# Patient Record
Sex: MALE | Race: WHITE | NOT HISPANIC OR LATINO | Employment: PART TIME | URBAN - NONMETROPOLITAN AREA
[De-identification: names, ages, dates, MRNs, and addresses within clinical notes are randomized per-mention and may not be internally consistent; named-entity substitution may affect disease eponyms.]

---

## 2017-06-09 ENCOUNTER — OFFICE VISIT (OUTPATIENT)
Dept: CARDIOLOGY | Facility: CLINIC | Age: 72
End: 2017-06-09

## 2017-06-09 VITALS
HEIGHT: 68 IN | HEART RATE: 65 BPM | BODY MASS INDEX: 25.91 KG/M2 | WEIGHT: 171 LBS | SYSTOLIC BLOOD PRESSURE: 102 MMHG | DIASTOLIC BLOOD PRESSURE: 70 MMHG

## 2017-06-09 DIAGNOSIS — I25.10 CORONARY ARTERY DISEASE INVOLVING NATIVE CORONARY ARTERY OF NATIVE HEART WITHOUT ANGINA PECTORIS: Primary | ICD-10-CM

## 2017-06-09 DIAGNOSIS — E11.9 TYPE 2 DIABETES MELLITUS WITHOUT COMPLICATION, WITHOUT LONG-TERM CURRENT USE OF INSULIN (HCC): ICD-10-CM

## 2017-06-09 DIAGNOSIS — E78.5 HYPERLIPIDEMIA LDL GOAL <70: ICD-10-CM

## 2017-06-09 DIAGNOSIS — I48.3 TYPICAL ATRIAL FLUTTER (HCC): ICD-10-CM

## 2017-06-09 DIAGNOSIS — Z95.3 STATUS POST AORTIC VALVE REPLACEMENT WITH BIOPROSTHETIC VALVE: ICD-10-CM

## 2017-06-09 PROCEDURE — 99213 OFFICE O/P EST LOW 20 MIN: CPT | Performed by: NURSE PRACTITIONER

## 2017-06-09 NOTE — PROGRESS NOTES
Encounter Date:06/09/2017    Patient ID: Christian Mcintosh is a 72 y.o. male who resides in [place], [place].    CC/Reason for visit:  Coronary Artery Disease (1 year follow up); Hyperlipidemia; and Hypertension          Christian Mcintosh returns today for 12 month follow-up of his prosthetic aortic valve replacement, mild coronary artery disease, hyperlipidemia and hypertension.  Since his last visit he had his left rotator cuff repair.  He feels he has done well with this but does still have some limited movement of his arm.  He says that his surgeon told him it may take up to a year before he has full range of motion.  He also had a hospitalization last year due to new onset of seizure.  He has not had any seizures since that one episode.  He is currently seeing neurology and was placed on Keppra.  Since he has had no recurrences of seizures he has been cleared for driving once again.  This past April his primary care provider started him on metformin for diabetes mellitus which is a new diagnosis for him.  He feels his heart is doing well.  He denies chest pain, dyspnea, orthopnea, palpitations or syncope.  He does carry a history of atrial flutter that occurred in the postop period from his aortic valve replacement.  He underwent an ablation with Dr. Padilla in 2013.  He has not had any reoccurrence of flutters.  He denies any signs or symptoms of a TIA or stroke.    Review of Systems   HENT: Positive for tinnitus.    Musculoskeletal: Positive for arthritis.   Genitourinary: Positive for frequency.   Neurological: Positive for excessive daytime sleepiness.       The patient's past medical, social, and family history reviewed in the patient's electronic medical record.    Allergies  Shellfish-derived products    Outpatient Prescriptions Marked as Taking for the 6/9/17 encounter (Office Visit) with RITCHIE Persaud   Medication Sig Dispense Refill   • amoxicillin (AMOXIL) 125 MG/5ML suspension Take  by mouth  "As Needed.     • aspirin 81 MG tablet Take 1 tablet by mouth daily.     • Docusate Sodium (STOOL SOFTENER) 100 MG capsule Take 1 tablet by mouth daily.     • lisinopril (PRINIVIL,ZESTRIL) 10 MG tablet Take 1 tablet by mouth daily.     • metFORMIN (GLUCOPHAGE) 500 MG tablet Take 500 mg by mouth Daily With Breakfast.     • Multiple Vitamins-Minerals (CENTRUM ADULTS PO) Take 1 tablet by mouth daily.     • pantoprazole (PROTONIX) 40 MG EC tablet Take 1 tablet by mouth daily.     • simvastatin (ZOCOR) 20 MG tablet Take 1 tablet by mouth every night.           Blood pressure 102/70, pulse 65, height 68\" (172.7 cm), weight 171 lb (77.6 kg).  Body mass index is 26 kg/(m^2).    Physical Exam   Constitutional: He is oriented to person, place, and time. He appears well-developed and well-nourished.   HENT:   Head: Normocephalic and atraumatic.   Eyes: Pupils are equal, round, and reactive to light. No scleral icterus.   Neck: No JVD present. Carotid bruit is not present. No thyromegaly present.   Cardiovascular: Normal rate, regular rhythm, S1 normal and S2 normal.  Exam reveals no gallop.    Murmur heard.  Pulmonary/Chest: Effort normal and breath sounds normal.   Abdominal: Soft. There is no tenderness.   Neurological: He is alert and oriented to person, place, and time.   Skin: Skin is warm and dry. No cyanosis. Nails show no clubbing.   Psychiatric: He has a normal mood and affect. His behavior is normal.       Data Review:   Procedures         Problem List Items Addressed This Visit        Cardiovascular and Mediastinum    Coronary artery disease involving native coronary artery of native heart without angina pectoris - Primary    Overview     · Cardiac catheterization (03/06/2013)  mild non obstructive CAD         Current Assessment & Plan     · Stable, patient asymptomatic  · Continue aspirin 81 mg daily         Status post aortic valve replacement with bioprosthetic valve    Overview     · Echocardiogram (08/08/2012): " Moderate AS, KLAUDIA of 0.8 cm2.  · Echocardiogram (02/26/2013): LVEF 55%, severe AS with mean gradient of 65 mmHg.   · Bioprosthetic aortic valve replacement by Dr. Stuart Griffin (03/26/2013): 25-mm Magna Ease with ascending aortoplasty. Left atrial appendage excluded         Current Assessment & Plan     · Asymptomatic  · Obtain echocardiogram before follow-up in 12 months         Relevant Orders    Adult Transthoracic Echo Complete With Contrast    Hyperlipidemia LDL goal <70    Overview     · High intensity statin therapy indicated given presence of coronary artery disease         Current Assessment & Plan     · Continue Zocor 20 mg daily  · Follow-up PCP for routine lipid monitoring         Typical atrial flutter    Overview     · Diagnosed postoperatively, asymptomatic.  · Typical flutter pattern on ECG, 05/03/2013. Initiated on Coumadin.  · Left atrial appendage excluded with AVR using [place] occluder.  · Radiofrequency ablation by Dr. Brooks Padilla, July 2013.   · Coumadin initiated following ablation but stopped after development of GI bleeding and a Tarsha-Villeda tear.          Current Assessment & Plan     · Patient asymptomatic            Endocrine    Type 2 diabetes mellitus without complication, without long-term current use of insulin    Current Assessment & Plan     · Follow-up with Dr. Merrill De La Paz         Relevant Medications    metFORMIN (GLUCOPHAGE) 500 MG tablet        Mr. Mcintosh is doing well from a cardiovascular standpoint.  He has had no signs or symptoms to suggest angina pectoris, valve malfunction or episodes of atrial flutter.  We will continue all of his current medications.  I will try to obtain recent blood work from his PCP.       · Continue current medications  · Echocardiogram before follow-up in 12 months.  · Follow-up 12 months or sooner if needed    Alba DUGAN evaluated treated patient independently    RITCHIE Lynn  6/9/2017      Addendum labs obtained from  PCP  Labs collected on 10/11/2016:  BUNs 14.1, calcium 9.9, creatinine 0.95, LDL 89, total cholesterol 162, HDL 46, triglycerides 133, hemoglobin 15.3, hematocrit 45.8, platelets 219, a LT 19, AST 23, potassium 4.8, sodium 14

## 2017-12-29 ENCOUNTER — TELEPHONE (OUTPATIENT)
Dept: CARDIOLOGY | Facility: CLINIC | Age: 72
End: 2017-12-29

## 2017-12-29 ENCOUNTER — APPOINTMENT (OUTPATIENT)
Dept: PREADMISSION TESTING | Facility: HOSPITAL | Age: 72
End: 2017-12-29

## 2017-12-29 VITALS — BODY MASS INDEX: 26.26 KG/M2 | WEIGHT: 167.33 LBS | HEIGHT: 67 IN

## 2017-12-29 LAB
ANION GAP SERPL CALCULATED.3IONS-SCNC: 5 MMOL/L (ref 3–11)
BUN BLD-MCNC: 29 MG/DL (ref 9–23)
BUN/CREAT SERPL: 29 (ref 7–25)
CALCIUM SPEC-SCNC: 9.1 MG/DL (ref 8.7–10.4)
CHLORIDE SERPL-SCNC: 102 MMOL/L (ref 99–109)
CO2 SERPL-SCNC: 30 MMOL/L (ref 20–31)
CREAT BLD-MCNC: 1 MG/DL (ref 0.6–1.3)
DEPRECATED RDW RBC AUTO: 46.1 FL (ref 37–54)
ERYTHROCYTE [DISTWIDTH] IN BLOOD BY AUTOMATED COUNT: 14.1 % (ref 11.3–14.5)
GFR SERPL CREATININE-BSD FRML MDRD: 74 ML/MIN/1.73
GLUCOSE BLD-MCNC: 84 MG/DL (ref 70–100)
HCT VFR BLD AUTO: 42.6 % (ref 38.9–50.9)
HGB BLD-MCNC: 13.6 G/DL (ref 13.1–17.5)
MCH RBC QN AUTO: 28.5 PG (ref 27–31)
MCHC RBC AUTO-ENTMCNC: 31.9 G/DL (ref 32–36)
MCV RBC AUTO: 89.3 FL (ref 80–99)
PLATELET # BLD AUTO: 363 10*3/MM3 (ref 150–450)
PMV BLD AUTO: 10.8 FL (ref 6–12)
POTASSIUM BLD-SCNC: 4.8 MMOL/L (ref 3.5–5.5)
RBC # BLD AUTO: 4.77 10*6/MM3 (ref 4.2–5.76)
SODIUM BLD-SCNC: 137 MMOL/L (ref 132–146)
WBC NRBC COR # BLD: 12.09 10*3/MM3 (ref 3.5–10.8)

## 2017-12-29 PROCEDURE — 36415 COLL VENOUS BLD VENIPUNCTURE: CPT

## 2017-12-29 PROCEDURE — 93010 ELECTROCARDIOGRAM REPORT: CPT | Performed by: INTERNAL MEDICINE

## 2017-12-29 PROCEDURE — 85027 COMPLETE CBC AUTOMATED: CPT | Performed by: UROLOGY

## 2017-12-29 PROCEDURE — 93005 ELECTROCARDIOGRAM TRACING: CPT

## 2017-12-29 PROCEDURE — 80048 BASIC METABOLIC PNL TOTAL CA: CPT | Performed by: UROLOGY

## 2017-12-29 RX ORDER — SIMVASTATIN 10 MG
10 TABLET ORAL NIGHTLY
COMMUNITY
End: 2019-05-07 | Stop reason: SDUPTHER

## 2017-12-29 RX ORDER — PRASTERONE (DHEA) 25 MG
25 CAPSULE ORAL DAILY
COMMUNITY

## 2017-12-29 RX ORDER — DUTASTERIDE AND TAMSULOSIN HYDROCHLORIDE CAPSULES .5; .4 MG/1; MG/1
1 CAPSULE ORAL DAILY
COMMUNITY
End: 2018-06-15

## 2017-12-29 NOTE — TELEPHONE ENCOUNTER
Patient is scheduled to have a green light laser on his prostate with Dr. Anabelle Alcaraz 3rd. That office advised him to stop his ASA 81 mg.     He has a bioprosthetic valve. He was on coumadin following his ablation for a flutter but it was stopped after developing a GI bleed. Also cath 3/6/2013 showed mild CAD. I advised Whitney in pre-admission testing that ideally, he should remain on it.  Is this OK with you?

## 2018-01-02 ENCOUNTER — ANESTHESIA EVENT (OUTPATIENT)
Dept: PERIOP | Facility: HOSPITAL | Age: 73
End: 2018-01-02

## 2018-01-02 RX ORDER — FAMOTIDINE 10 MG/ML
20 INJECTION, SOLUTION INTRAVENOUS ONCE
Status: CANCELLED | OUTPATIENT
Start: 2018-01-02 | End: 2018-01-02

## 2018-01-03 ENCOUNTER — ANESTHESIA (OUTPATIENT)
Dept: PERIOP | Facility: HOSPITAL | Age: 73
End: 2018-01-03

## 2018-01-03 ENCOUNTER — HOSPITAL ENCOUNTER (OUTPATIENT)
Facility: HOSPITAL | Age: 73
Setting detail: HOSPITAL OUTPATIENT SURGERY
Discharge: HOME | End: 2018-01-03
Attending: UROLOGY | Admitting: UROLOGY

## 2018-01-03 VITALS
RESPIRATION RATE: 18 BRPM | OXYGEN SATURATION: 94 % | TEMPERATURE: 97.5 F | WEIGHT: 167 LBS | HEIGHT: 67 IN | BODY MASS INDEX: 26.21 KG/M2 | SYSTOLIC BLOOD PRESSURE: 121 MMHG | HEART RATE: 67 BPM | DIASTOLIC BLOOD PRESSURE: 77 MMHG

## 2018-01-03 DIAGNOSIS — N40.1 ENLARGED PROSTATE WITH URINARY OBSTRUCTION: ICD-10-CM

## 2018-01-03 DIAGNOSIS — N13.8 ENLARGED PROSTATE WITH URINARY OBSTRUCTION: ICD-10-CM

## 2018-01-03 LAB
GLUCOSE BLDC GLUCOMTR-MCNC: 108 MG/DL (ref 70–130)
POTASSIUM BLDA-SCNC: 4.02 MMOL/L (ref 3.5–5.3)

## 2018-01-03 PROCEDURE — G0416 PROSTATE BIOPSY, ANY MTHD: HCPCS | Performed by: UROLOGY

## 2018-01-03 PROCEDURE — 25010000002 CEFTRIAXONE PER 250 MG: Performed by: UROLOGY

## 2018-01-03 PROCEDURE — 82962 GLUCOSE BLOOD TEST: CPT

## 2018-01-03 PROCEDURE — 25010000002 ONDANSETRON PER 1 MG: Performed by: NURSE ANESTHETIST, CERTIFIED REGISTERED

## 2018-01-03 PROCEDURE — 25010000002 FENTANYL CITRATE (PF) 100 MCG/2ML SOLUTION: Performed by: NURSE ANESTHETIST, CERTIFIED REGISTERED

## 2018-01-03 PROCEDURE — 25010000002 PROPOFOL 10 MG/ML EMULSION: Performed by: NURSE ANESTHETIST, CERTIFIED REGISTERED

## 2018-01-03 PROCEDURE — 25010000002 DEXAMETHASONE PER 1 MG: Performed by: NURSE ANESTHETIST, CERTIFIED REGISTERED

## 2018-01-03 PROCEDURE — 84132 ASSAY OF SERUM POTASSIUM: CPT | Performed by: ANESTHESIOLOGY

## 2018-01-03 PROCEDURE — 88344 IMHCHEM/IMCYTCHM EA MLT ANTB: CPT | Performed by: UROLOGY

## 2018-01-03 PROCEDURE — 25010000002 GENTAMICIN PER 80 MG: Performed by: UROLOGY

## 2018-01-03 RX ORDER — LIDOCAINE HYDROCHLORIDE 10 MG/ML
0.5 INJECTION, SOLUTION EPIDURAL; INFILTRATION; INTRACAUDAL; PERINEURAL ONCE AS NEEDED
Status: COMPLETED | OUTPATIENT
Start: 2018-01-03 | End: 2018-01-03

## 2018-01-03 RX ORDER — MAGNESIUM HYDROXIDE 1200 MG/15ML
LIQUID ORAL AS NEEDED
Status: DISCONTINUED | OUTPATIENT
Start: 2018-01-03 | End: 2018-01-03 | Stop reason: HOSPADM

## 2018-01-03 RX ORDER — NICOTINE POLACRILEX 4 MG
15 LOZENGE BUCCAL
Status: DISCONTINUED | OUTPATIENT
Start: 2018-01-03 | End: 2018-01-03 | Stop reason: HOSPADM

## 2018-01-03 RX ORDER — DEXTROSE MONOHYDRATE 25 G/50ML
25 INJECTION, SOLUTION INTRAVENOUS
Status: DISCONTINUED | OUTPATIENT
Start: 2018-01-03 | End: 2018-01-03 | Stop reason: HOSPADM

## 2018-01-03 RX ORDER — SODIUM CHLORIDE 0.9 % (FLUSH) 0.9 %
1-10 SYRINGE (ML) INJECTION AS NEEDED
Status: DISCONTINUED | OUTPATIENT
Start: 2018-01-03 | End: 2018-01-03 | Stop reason: HOSPADM

## 2018-01-03 RX ORDER — CIPROFLOXACIN 500 MG/1
500 TABLET, FILM COATED ORAL EVERY 12 HOURS SCHEDULED
Qty: 10 TABLET | Refills: 0 | Status: SHIPPED | OUTPATIENT
Start: 2018-01-03 | End: 2018-01-08

## 2018-01-03 RX ORDER — HYDROCODONE BITARTRATE AND ACETAMINOPHEN 5; 325 MG/1; MG/1
1 TABLET ORAL EVERY 6 HOURS PRN
Qty: 24 TABLET | Refills: 0 | Status: SHIPPED | OUTPATIENT
Start: 2018-01-03 | End: 2018-02-22

## 2018-01-03 RX ORDER — FENTANYL CITRATE 50 UG/ML
50 INJECTION, SOLUTION INTRAMUSCULAR; INTRAVENOUS
Status: DISCONTINUED | OUTPATIENT
Start: 2018-01-03 | End: 2018-01-03 | Stop reason: HOSPADM

## 2018-01-03 RX ORDER — PROPOFOL 10 MG/ML
VIAL (ML) INTRAVENOUS AS NEEDED
Status: DISCONTINUED | OUTPATIENT
Start: 2018-01-03 | End: 2018-01-03 | Stop reason: SURG

## 2018-01-03 RX ORDER — FAMOTIDINE 20 MG/1
20 TABLET, FILM COATED ORAL ONCE
Status: COMPLETED | OUTPATIENT
Start: 2018-01-03 | End: 2018-01-03

## 2018-01-03 RX ORDER — SODIUM CHLORIDE, SODIUM LACTATE, POTASSIUM CHLORIDE, CALCIUM CHLORIDE 600; 310; 30; 20 MG/100ML; MG/100ML; MG/100ML; MG/100ML
9 INJECTION, SOLUTION INTRAVENOUS CONTINUOUS
Status: DISCONTINUED | OUTPATIENT
Start: 2018-01-03 | End: 2018-01-03 | Stop reason: HOSPADM

## 2018-01-03 RX ORDER — ONDANSETRON 2 MG/ML
4 INJECTION INTRAMUSCULAR; INTRAVENOUS ONCE AS NEEDED
Status: COMPLETED | OUTPATIENT
Start: 2018-01-03 | End: 2018-01-03

## 2018-01-03 RX ORDER — CEFAZOLIN SODIUM 2 G/100ML
2 INJECTION, SOLUTION INTRAVENOUS ONCE
Status: DISCONTINUED | OUTPATIENT
Start: 2018-01-03 | End: 2018-01-03

## 2018-01-03 RX ORDER — CEFTRIAXONE SODIUM 1 G/50ML
1 INJECTION, SOLUTION INTRAVENOUS ONCE
Status: COMPLETED | OUTPATIENT
Start: 2018-01-03 | End: 2018-01-03

## 2018-01-03 RX ORDER — DEXAMETHASONE SODIUM PHOSPHATE 4 MG/ML
INJECTION, SOLUTION INTRA-ARTICULAR; INTRALESIONAL; INTRAMUSCULAR; INTRAVENOUS; SOFT TISSUE AS NEEDED
Status: DISCONTINUED | OUTPATIENT
Start: 2018-01-03 | End: 2018-01-03 | Stop reason: SURG

## 2018-01-03 RX ORDER — HYDROCODONE BITARTRATE AND ACETAMINOPHEN 5; 325 MG/1; MG/1
1 TABLET ORAL ONCE AS NEEDED
Status: COMPLETED | OUTPATIENT
Start: 2018-01-03 | End: 2018-01-03

## 2018-01-03 RX ORDER — LIDOCAINE HYDROCHLORIDE 10 MG/ML
INJECTION, SOLUTION EPIDURAL; INFILTRATION; INTRACAUDAL; PERINEURAL AS NEEDED
Status: DISCONTINUED | OUTPATIENT
Start: 2018-01-03 | End: 2018-01-03 | Stop reason: SURG

## 2018-01-03 RX ADMIN — FAMOTIDINE 20 MG: 20 TABLET ORAL at 06:48

## 2018-01-03 RX ADMIN — GENTAMICIN SULFATE 100 MG: 40 INJECTION, SOLUTION INTRAMUSCULAR; INTRAVENOUS at 07:30

## 2018-01-03 RX ADMIN — SODIUM CHLORIDE, POTASSIUM CHLORIDE, SODIUM LACTATE AND CALCIUM CHLORIDE 9 ML/HR: 600; 310; 30; 20 INJECTION, SOLUTION INTRAVENOUS at 06:48

## 2018-01-03 RX ADMIN — FENTANYL CITRATE 50 MCG: 50 INJECTION INTRAMUSCULAR; INTRAVENOUS at 07:31

## 2018-01-03 RX ADMIN — LIDOCAINE HYDROCHLORIDE 50 MG: 10 INJECTION, SOLUTION EPIDURAL; INFILTRATION; INTRACAUDAL; PERINEURAL at 07:25

## 2018-01-03 RX ADMIN — ONDANSETRON 4 MG: 2 INJECTION INTRAMUSCULAR; INTRAVENOUS at 07:31

## 2018-01-03 RX ADMIN — DEXAMETHASONE SODIUM PHOSPHATE 8 MG: 4 INJECTION, SOLUTION INTRAMUSCULAR; INTRAVENOUS at 07:31

## 2018-01-03 RX ADMIN — LIDOCAINE HYDROCHLORIDE 0.3 ML: 10 INJECTION, SOLUTION EPIDURAL; INFILTRATION; INTRACAUDAL; PERINEURAL at 06:48

## 2018-01-03 RX ADMIN — GENTAMICIN SULFATE: 40 INJECTION, SOLUTION INTRAMUSCULAR; INTRAVENOUS at 07:23

## 2018-01-03 RX ADMIN — FENTANYL CITRATE 50 MCG: 50 INJECTION INTRAMUSCULAR; INTRAVENOUS at 09:21

## 2018-01-03 RX ADMIN — FENTANYL CITRATE 50 MCG: 50 INJECTION INTRAMUSCULAR; INTRAVENOUS at 07:36

## 2018-01-03 RX ADMIN — CEFTRIAXONE SODIUM 1 G: 1 INJECTION, SOLUTION INTRAVENOUS at 07:23

## 2018-01-03 RX ADMIN — HYDROCODONE BITARTRATE AND ACETAMINOPHEN 1 TABLET: 5; 325 TABLET ORAL at 09:49

## 2018-01-03 RX ADMIN — PROPOFOL 200 MG: 10 INJECTION, EMULSION INTRAVENOUS at 07:25

## 2018-01-03 RX ADMIN — CEFTRIAXONE SODIUM 1 G: 1 INJECTION, SOLUTION INTRAVENOUS at 07:13

## 2018-01-03 RX ADMIN — FENTANYL CITRATE 50 MCG: 50 INJECTION INTRAMUSCULAR; INTRAVENOUS at 09:32

## 2018-01-03 NOTE — ANESTHESIA POSTPROCEDURE EVALUATION
Patient: Christian Mcintosh    Procedure Summary     Date Anesthesia Start Anesthesia Stop Room / Location    01/03/18 0723   [facility] / [facility]       Procedure Diagnosis Surgeon Provider    PROSTATE ULTRASOUND AND BIOPSY,CYSTOSCOPY TRANSURETHRAL RESECTION OF PROSTATE GREENLIGHT WITH VAPORIZATION (N/A ) No diagnosis on file. MD Andres Green MD          Anesthesia Type: general  Last vitals  BP   108/79 (01/03/18 0640)   Temp   98.2 °F (36.8 °C) (01/03/18 0640)   Pulse   64 (01/03/18 0640)   Resp   20 (01/03/18 0640)     SpO2   98 % (01/03/18 0640)     Post Anesthesia Care and Evaluation    Patient location during evaluation: PACU  Patient participation: complete - patient participated  Level of consciousness: awake and alert  Pain score: 0  Pain management: adequate  Airway patency: patent  Anesthetic complications: No anesthetic complications  PONV Status: none  Cardiovascular status: hemodynamically stable and acceptable  Respiratory status: nonlabored ventilation, acceptable and nasal cannula  Hydration status: acceptable

## 2018-01-03 NOTE — OP NOTE
Pre-op diagnosis is urinary retention and BPH and elevated PSA  Postoperative diagnosis: Same  Procedure performed: Transrectal ultrasound with biopsies of the prostate, cystoscopy with greenlight laser vaporization of the prostate ([id number] J)  Surgeon: Anabelle  Anesthesia: Gen.  Indications: This is a 72-year-old white male whose been in urinary retention for about the last month that occurred on a trip to out of [facility].  He's been doing self intermittent catheterization for the last 2 weeks.  Operative description: Patient was placed on the operating table in the dorsolithotomy position and general trach anesthesia was administered.  Groin was prepped and draped usual sterile fashion.  The Novaliq transrectal ultrasound probe was introduced.  The gland was measured to be 41.6 mm in width 35.2 in height 54.7 mm in length for a volume of 41.9 cm³.  It was extremely heterogeneous throughout.  The Biopty gun was then used to take biopsies from the left and right side and these were sent for pathologic examination.  The probe was then removed atraumatically.    The 24 Green[place] Olympus laser scope was then inserted.  The urethra suspected no be normal.  The prostate showed bilobar hyperplasia that was anatomically obstructing and the bladder was entered.  Orifices were in their normal location effluxing clear urine the bladder did have areas of hyperemia of the mucosa and the 2+ trabeculations with cellules throughout there were no stones or tumors noted.  The side-firing greenlight laser fiber was then introduced.  From  W of power the laser was used to vaporize the lateral lobes of the prostate in the usual fashion at no time was vaporization carried out proximal to the bladder neck or distal to the verumontanum.  A total of [id number] J were delivered over 36 minutes and 58 seconds of laser time.  Stasis was maintained with the laser throughout the case.  All appeared to be wide open and the orifices and external sphincter  were inspected and noted to be intact.  I then removed the laser scope and inserted a 22 [place] three-way Harris catheter.  This was connected to continuous irrigation and he was awakened and taken to the recovery room in stable condition on no complications  The hospital dictation system is broken so this was done on a voice recognition system.  There may be significant transcription errors.

## 2018-01-03 NOTE — ANESTHESIA POSTPROCEDURE EVALUATION
Patient: Christian Mcintosh    Procedure Summary     Date Anesthesia Start Anesthesia Stop Room / Location    01/03/18 0723   [facility] / [facility]       Procedure Diagnosis Surgeon Provider    PROSTATE ULTRASOUND AND BIOPSY,CYSTOSCOPY TRANSURETHRAL RESECTION OF PROSTATE GREENLIGHT WITH VAPORIZATION (N/A ) No diagnosis on file. MD Andres Green MD          Anesthesia Type: general  Last vitals  /75   Temp 98   Pulse 68   Resp 20   SpO2 95     Anesthesia Post Evaluation

## 2018-01-03 NOTE — ANESTHESIA PROCEDURE NOTES
Airway  Urgency: elective    Date/Time: 1/3/2018 7:32 AM  End Time:1/3/2018 7:32 AM  Airway not difficult    General Information and Staff    Patient location during procedure: OR  CRNA: CORBIN GLEZ    Indications and Patient Condition  Indications for airway management: airway protection    Preoxygenated: yes  Mask difficulty assessment: 1 - vent by mask    Final Airway Details  Final airway type: supraglottic airway      Successful airway: classic  Size 4    Number of attempts at approach: 1    Additional Comments  LMA placed without difficulty, ventilation with assist, equal breath sounds and symmetric chest rise and fall, easy atraumatic

## 2018-01-03 NOTE — H&P
Patient Care Team:      Chief complaint: Urinary Retention    Subjective:    Patient is a 72 y.o.male presents with Urinary Retension   Review of Systems:  General ROS: negative  Cardiovascular ROS: Atrial flutter hx  Respiratory ROS: no cough, shortness of breath, or wheezing      Allergies:   Allergies   Allergen Reactions   • Shellfish-Derived Products Anaphylaxis          Latex: no  Contrast Dye: no    Home Meds    Prescriptions Prior to Admission   Medication Sig Dispense Refill Last Dose   • amoxicillin (AMOXIL) 125 MG/5ML suspension Take  by mouth As Needed (for dental procedure).   Past Month at Unknown time   • aspirin 81 MG tablet Take 1 tablet by mouth Daily. 90 tablet 3 1/2/2018 at 0700   • Docusate Sodium (STOOL SOFTENER) 100 MG capsule Take 1 tablet by mouth daily.   1/3/2018 at 0415   • dutasteride-tamsulosin (KE) 0.5-0.4 MG capsule capsule Take 1 capsule by mouth Daily.   1/3/2018 at 0415   • levETIRAcetam (KEPPRA) 500 MG tablet Take 1 tablet by mouth 2 (two) times a day. (Patient taking differently: Take 250 mg by mouth Every Night.) 180 tablet 1 1/2/2018 at 2100   • lisinopril (PRINIVIL,ZESTRIL) 10 MG tablet Take 1 tablet by mouth daily.   1/2/2018 at 2100   • metFORMIN (GLUCOPHAGE) 500 MG tablet Take 500 mg by mouth Daily With Breakfast.   1/2/2018 at 0800   • Meth-Hyo-M Bl-Na Phos-Ph Sal (URO-MP PO) Take 1 tablet by mouth 3 (Three) Times a Day As Needed.   1/2/2018 at 0800   • pantoprazole (PROTONIX) 40 MG EC tablet Take 1 tablet by mouth daily.   1/3/2018 at 0415   • simvastatin (ZOCOR) 10 MG tablet Take 10 mg by mouth Every Night.   1/2/2018 at 2100   • Cholecalciferol (VITAMIN D3) 5000 units capsule capsule Take 5,000 Units by mouth Daily.   12/29/2017   • Coenzyme Q10 (CO Q 10) 100 MG capsule Take 200 mg by mouth Daily.   12/29/2017   • DHEA 25 MG capsule Take 25 mg by mouth Daily.   12/29/2017   • Multiple Vitamins-Minerals (CENTRUM ADULTS PO) Take 1 tablet by mouth daily.   12/29/2017  "  • Omega-3 Fatty Acids (FISH OIL PO) Take 1 tablet by mouth Daily.   12/29/2017     PMH:   Past Medical History:   Diagnosis Date   • Aortic stenosis    • Atrial flutter     RFA by Dr. Padilla   • Benign prostatic hypertrophy    • Cataract    • Coronary artery disease 03/06/2013   • GERD (gastroesophageal reflux disease)    • H/O atrial flutter    • History of echocardiogram    • Hyperlipidemia    • Hypertension    • PONV (postoperative nausea and vomiting)    • Seizure    • Skin cancer     skin   • Tinnitus      PSH:    Past Surgical History:   Procedure Laterality Date   • BONE SPUR ARM     • CARDIAC ABLATION     • CARDIAC CATHETERIZATION  03/06/2013   • CARDIAC VALVE REPLACEMENT  03/26/2013    bioprosthetic aortic valve   • CARDIAC VALVE REPLACEMENT     • CARPAL TUNNEL RELEASE     • CYST REMOVAL     • EYE SURGERY      bilateral cataract extraction   • HERNIA REPAIR     • KNEE MENISCAL REPAIR Bilateral    • PROSTATE BIOPSY     • ROTATOR CUFF REPAIR     • SHOULDER SURGERY Left    • SKIN CANCER EXCISION     • VASECTOMY       Immunization History: pneumo: yes   Flu: yes  Tetanus ?  Social History:   Tobacco: no   Alcohol: occ      Physical Exam:/79 (BP Location: Right arm, Patient Position: Lying)  Pulse 64  Temp 98.2 °F (36.8 °C) (Temporal Artery )   Resp 20  Ht 170.2 cm (67\")  Wt 75.8 kg (167 lb)  SpO2 98%  BMI 26.16 kg/m2      General Appearance:    Alert, cooperative, no distress, appears stated age   Head:    Normocephalic, without obvious abnormality, atraumatic   Lungs:     Clear to auscultation bilaterally, respirations unlabored    Heart:   Irregular rhythym, rate 60        Abdomen:    Soft without tenderness   Breast Exam:    deferred   Genitalia:    deferred   Extremities:   Extremities normal, atraumatic, no cyanosis or edema   Skin:   Skin color, texture, turgor normal, no rashes or lesions   Neurologic:   Grossly intact       Cancer Patient: __ yes __no __unknown; If yes, clinical stage " T:__ N:__M:__, stage group    Impression: Urinary Retention    Plan: Prostatic Ultrasound and Biopsy, Cystoscopy with Greenlight Laser Vaporization of the Prostate  Micha Lainez PA-C 1/3/2018 7:01 AM

## 2018-01-03 NOTE — ANESTHESIA PREPROCEDURE EVALUATION
Anesthesia Evaluation            Airway   Mallampati: II  Dental      Pulmonary    Cardiovascular     (+) hypertension, CAD,       Neuro/Psych  (+) seizures,     GI/Hepatic/Renal/Endo    (+)  diabetes mellitus,     Musculoskeletal     Abdominal    Substance History      OB/GYN          Other                                                Anesthesia Plan    ASA 3     general     intravenous induction   Anesthetic plan and risks discussed with patient.

## 2018-01-03 NOTE — BRIEF OP NOTE
CYSTOSCOPY TRANSURETHRAL RESECTION OF PROSTATE GREENLIGHT  Progress Note    Christian Mcintosh  1/3/2018    Pre-op Diagnosis:   Urinary retention secondary to BPH       Post-Op Diagnosis Codes:  Same    Procedure/CPT® Codes:      Procedure(s):  PROSTATE ULTRASOUND AND BIOPSY,CYSTOSCOPY TRANSURETHRAL PROSTATE GREENLIGHT WITH VAPORIZATION    Surgeon(s):  Silvio Ahn MD    Anesthesia: General    Staff:   Circulator: Darion Ma RN  Scrub Person: Shelia Prasad    Estimated Blood Loss: Minimal    Urine Voided: * No values recorded between 1/3/2018  7:23 AM and 1/3/2018  8:53 AM *    Specimens:                  ID Type Source Tests Collected by Time Destination   A : LEFT SIDE OF PROSTATE Tissue Prostate TISSUE EXAM Silvio Ahn MD 1/3/2018 0804    B : RIGHT SIDE OF PROSTATE Tissue Prostate TISSUE EXAM Silvio Ahn MD 1/3/2018 0807          Drains:   Urethral Catheter 01/03/18 100% silicone 22 30 (Active)           Findings: Obstructing prostate, trabeculated bladder     Complications: None, to recovery room stable  Silvio Ahn MD     Date: 1/3/2018  Time: 8:53 AM

## 2018-01-08 LAB
CYTO UR: NORMAL
LAB AP CASE REPORT: NORMAL
LAB AP CLINICAL INFORMATION: NORMAL
Lab: NORMAL
PATH REPORT.FINAL DX SPEC: NORMAL
PATH REPORT.GROSS SPEC: NORMAL

## 2018-05-14 DIAGNOSIS — Z95.2 STATUS POST AORTIC VALVE REPLACEMENT: Primary | ICD-10-CM

## 2018-06-15 ENCOUNTER — HOSPITAL ENCOUNTER (OUTPATIENT)
Dept: CARDIOLOGY | Facility: HOSPITAL | Age: 73
Discharge: HOME | End: 2018-06-15
Admitting: NURSE PRACTITIONER

## 2018-06-15 ENCOUNTER — OFFICE VISIT (OUTPATIENT)
Dept: CARDIOLOGY | Facility: CLINIC | Age: 73
End: 2018-06-15

## 2018-06-15 VITALS
BODY MASS INDEX: 26.64 KG/M2 | WEIGHT: 169.75 LBS | SYSTOLIC BLOOD PRESSURE: 124 MMHG | DIASTOLIC BLOOD PRESSURE: 74 MMHG | HEIGHT: 67 IN

## 2018-06-15 VITALS
BODY MASS INDEX: 27.15 KG/M2 | WEIGHT: 173 LBS | SYSTOLIC BLOOD PRESSURE: 124 MMHG | HEIGHT: 67 IN | HEART RATE: 60 BPM | DIASTOLIC BLOOD PRESSURE: 82 MMHG

## 2018-06-15 DIAGNOSIS — I25.10 CORONARY ARTERY DISEASE INVOLVING NATIVE CORONARY ARTERY OF NATIVE HEART WITHOUT ANGINA PECTORIS: Primary | ICD-10-CM

## 2018-06-15 DIAGNOSIS — E78.5 HYPERLIPIDEMIA LDL GOAL <70: ICD-10-CM

## 2018-06-15 DIAGNOSIS — I10 ESSENTIAL HYPERTENSION: ICD-10-CM

## 2018-06-15 DIAGNOSIS — E11.9 TYPE 2 DIABETES MELLITUS WITHOUT COMPLICATION, WITHOUT LONG-TERM CURRENT USE OF INSULIN (HCC): ICD-10-CM

## 2018-06-15 DIAGNOSIS — Z95.3 STATUS POST AORTIC VALVE REPLACEMENT WITH BIOPROSTHETIC VALVE: ICD-10-CM

## 2018-06-15 DIAGNOSIS — Z95.2 STATUS POST AORTIC VALVE REPLACEMENT: ICD-10-CM

## 2018-06-15 LAB
BH CV ECHO MEAS - AO MAX PG (FULL): 11.1 MMHG
BH CV ECHO MEAS - AO MAX PG: 16 MMHG
BH CV ECHO MEAS - AO MEAN PG (FULL): 7 MMHG
BH CV ECHO MEAS - AO MEAN PG: 9 MMHG
BH CV ECHO MEAS - AO ROOT AREA (BSA CORRECTED): 2
BH CV ECHO MEAS - AO ROOT AREA: 10.8 CM^2
BH CV ECHO MEAS - AO ROOT DIAM: 3.7 CM
BH CV ECHO MEAS - AO V2 MAX: 200.7 CM/SEC
BH CV ECHO MEAS - AO V2 MEAN: 140 CM/SEC
BH CV ECHO MEAS - AO V2 VTI: 42.6 CM
BH CV ECHO MEAS - AVA(I,A): 2 CM^2
BH CV ECHO MEAS - AVA(I,D): 2 CM^2
BH CV ECHO MEAS - AVA(V,A): 1.9 CM^2
BH CV ECHO MEAS - AVA(V,D): 1.9 CM^2
BH CV ECHO MEAS - BSA(HAYCOCK): 1.9 M^2
BH CV ECHO MEAS - BSA: 1.9 M^2
BH CV ECHO MEAS - BZI_BMI: 26.6 KILOGRAMS/M^2
BH CV ECHO MEAS - BZI_METRIC_HEIGHT: 170.2 CM
BH CV ECHO MEAS - BZI_METRIC_WEIGHT: 77.1 KG
BH CV ECHO MEAS - CONTRAST EF (2CH): 35.6 ML/M^2
BH CV ECHO MEAS - CONTRAST EF 4CH: 42.9 ML/M^2
BH CV ECHO MEAS - EDV(CUBED): 91.1 ML
BH CV ECHO MEAS - EDV(MOD-SP2): 118 ML
BH CV ECHO MEAS - EDV(MOD-SP4): 126 ML
BH CV ECHO MEAS - EDV(TEICH): 92.4 ML
BH CV ECHO MEAS - EF(CUBED): 47.1 %
BH CV ECHO MEAS - EF(MOD-BP): 45 %
BH CV ECHO MEAS - EF(MOD-SP2): 35.6 %
BH CV ECHO MEAS - EF(MOD-SP4): 42.9 %
BH CV ECHO MEAS - EF(TEICH): 39.5 %
BH CV ECHO MEAS - ESV(CUBED): 48.2 ML
BH CV ECHO MEAS - ESV(MOD-SP2): 76 ML
BH CV ECHO MEAS - ESV(MOD-SP4): 72 ML
BH CV ECHO MEAS - ESV(TEICH): 55.9 ML
BH CV ECHO MEAS - FS: 19.1 %
BH CV ECHO MEAS - IVS/LVPW: 1
BH CV ECHO MEAS - IVSD: 1.1 CM
BH CV ECHO MEAS - LA DIMENSION: 3.8 CM
BH CV ECHO MEAS - LA/AO: 1
BH CV ECHO MEAS - LV DIASTOLIC VOL/BSA (35-75): 66.8 ML/M^2
BH CV ECHO MEAS - LV IVRT: 0.07 SEC
BH CV ECHO MEAS - LV MASS(C)D: 171.7 GRAMS
BH CV ECHO MEAS - LV MASS(C)DI: 91 GRAMS/M^2
BH CV ECHO MEAS - LV MAX PG: 4.9 MMHG
BH CV ECHO MEAS - LV MEAN PG: 2 MMHG
BH CV ECHO MEAS - LV SYSTOLIC VOL/BSA (12-30): 38.2 ML/M^2
BH CV ECHO MEAS - LV V1 MAX: 111 CM/SEC
BH CV ECHO MEAS - LV V1 MEAN: 64.7 CM/SEC
BH CV ECHO MEAS - LV V1 VTI: 24.6 CM
BH CV ECHO MEAS - LVIDD: 4.5 CM
BH CV ECHO MEAS - LVIDS: 3.6 CM
BH CV ECHO MEAS - LVLD AP2: 7.9 CM
BH CV ECHO MEAS - LVLD AP4: 8.2 CM
BH CV ECHO MEAS - LVLS AP2: 6.6 CM
BH CV ECHO MEAS - LVLS AP4: 7 CM
BH CV ECHO MEAS - LVOT AREA (M): 3.5 CM^2
BH CV ECHO MEAS - LVOT AREA: 3.5 CM^2
BH CV ECHO MEAS - LVOT DIAM: 2.1 CM
BH CV ECHO MEAS - LVPWD: 1.1 CM
BH CV ECHO MEAS - MV A MAX VEL: 86.4 CM/SEC
BH CV ECHO MEAS - MV DEC SLOPE: 269 CM/SEC^2
BH CV ECHO MEAS - MV DEC TIME: 0.26 SEC
BH CV ECHO MEAS - MV E MAX VEL: 74 CM/SEC
BH CV ECHO MEAS - MV E/A: 0.86
BH CV ECHO MEAS - MV MAX PG: 2.8 MMHG
BH CV ECHO MEAS - MV MEAN PG: 1 MMHG
BH CV ECHO MEAS - MV P1/2T MAX VEL: 74 CM/SEC
BH CV ECHO MEAS - MV P1/2T: 80.6 MSEC
BH CV ECHO MEAS - MV V2 MAX: 84.2 CM/SEC
BH CV ECHO MEAS - MV V2 MEAN: 41.8 CM/SEC
BH CV ECHO MEAS - MV V2 VTI: 30.8 CM
BH CV ECHO MEAS - MVA P1/2T LCG: 3 CM^2
BH CV ECHO MEAS - MVA(P1/2T): 2.7 CM^2
BH CV ECHO MEAS - MVA(VTI): 2.8 CM^2
BH CV ECHO MEAS - PA ACC SLOPE: 630 CM/SEC^2
BH CV ECHO MEAS - PA ACC TIME: 0.12 SEC
BH CV ECHO MEAS - PA PR(ACCEL): 24.6 MMHG
BH CV ECHO MEAS - PI END-D VEL: 115 CM/SEC
BH CV ECHO MEAS - RAP SYSTOLE: 8 MMHG
BH CV ECHO MEAS - RVDD: 3.4 CM
BH CV ECHO MEAS - RVSP: 24.8 MMHG
BH CV ECHO MEAS - SI(AO): 242.7 ML/M^2
BH CV ECHO MEAS - SI(CUBED): 22.7 ML/M^2
BH CV ECHO MEAS - SI(LVOT): 45.2 ML/M^2
BH CV ECHO MEAS - SI(MOD-SP2): 22.3 ML/M^2
BH CV ECHO MEAS - SI(MOD-SP4): 28.6 ML/M^2
BH CV ECHO MEAS - SI(TEICH): 19.4 ML/M^2
BH CV ECHO MEAS - SV(AO): 458 ML
BH CV ECHO MEAS - SV(CUBED): 42.9 ML
BH CV ECHO MEAS - SV(LVOT): 85.2 ML
BH CV ECHO MEAS - SV(MOD-SP2): 42 ML
BH CV ECHO MEAS - SV(MOD-SP4): 54 ML
BH CV ECHO MEAS - SV(TEICH): 36.6 ML
BH CV ECHO MEAS - TAPSE (>1.6): 1.8 CM2
BH CV ECHO MEAS - TR MAX VEL: 205 CM/SEC
BH CV VAS BP LEFT ARM: NORMAL MMHG
BH CV XLRA - RV BASE: 4 CM
BH CV XLRA - RV LENGTH: 6.7 CM
BH CV XLRA - RV MID: 3 CM
LEFT ATRIUM VOLUME INDEX: 32 ML/M2
LV EF 2D ECHO EST: 48 %

## 2018-06-15 PROCEDURE — 99214 OFFICE O/P EST MOD 30 MIN: CPT | Performed by: INTERNAL MEDICINE

## 2018-06-15 PROCEDURE — 93306 TTE W/DOPPLER COMPLETE: CPT | Performed by: INTERNAL MEDICINE

## 2018-06-15 PROCEDURE — 93306 TTE W/DOPPLER COMPLETE: CPT

## 2018-06-15 NOTE — ASSESSMENT & PLAN NOTE
· Echo today shows normal function bioprosthetic aortic valve with a trivial perivalvular leak.  · Patient has no symptoms of heart failure  · Repeat echocardiogram in 1-2 years

## 2018-06-15 NOTE — PROGRESS NOTES
Encounter Date:06/15/2018    Patient ID: Christian Mcintosh is a 73 y.o. male who resides in [facility]/Reason for visit:  Cardiac Valve Problem and Coronary Artery Disease            Christian Mcintosh returns to the office for 12 month follow-up of his bioprosthetic aortic valve, mild coronary artery disease, cardiac risk factors. The patient remained asymptomatic from a cardiovascular standpoint despite a active lifestyle of routine physical activity at the gym. He denies any palpitations, shortness of breath, TIA, or stroke symptoms. Since his last visit, he underwent green light laser therapy for BPH and cataract surgery.    Review of Systems   Constitution: Negative for weakness and malaise/fatigue.   Eyes: Negative for vision loss in left eye and vision loss in right eye.   Cardiovascular: Negative for chest pain, dyspnea on exertion, near-syncope, orthopnea, palpitations, paroxysmal nocturnal dyspnea and syncope.   Endocrine: Positive for polyuria.   Musculoskeletal: Negative for myalgias.   Genitourinary: Positive for frequency.   Neurological: Positive for excessive daytime sleepiness. Negative for brief paralysis, focal weakness, numbness and paresthesias.   Allergic/Immunologic: Positive for environmental allergies.   All other systems reviewed and are negative.      The patient's past medical, social, family history and ROS reviewed in the patient's electronic medical record.    Allergies  Shellfish-derived products    Outpatient Prescriptions Marked as Taking for the 6/15/18 encounter (Office Visit) with Marshal Spangler IV, MD   Medication Sig Dispense Refill   • amoxicillin (AMOXIL) 125 MG/5ML suspension Take  by mouth As Needed (for dental procedure).     • aspirin 81 MG tablet Take 1 tablet by mouth Daily. 90 tablet 3   • Cholecalciferol (VITAMIN D3) 5000 units capsule capsule Take 5,000 Units by mouth Daily.     • Coenzyme Q10 (CO Q 10) 100 MG capsule Take 200 mg by mouth Daily.     • DHEA 25  "MG capsule Take 25 mg by mouth Daily.     • Docusate Sodium (STOOL SOFTENER) 100 MG capsule Take 1 tablet by mouth daily.     • levETIRAcetam (KEPPRA) 500 MG tablet Take 1 tablet by mouth 2 (two) times a day. (Patient taking differently: Take 250 mg by mouth Every Night.) 180 tablet 1   • lisinopril (PRINIVIL,ZESTRIL) 10 MG tablet Take 1 tablet by mouth daily.     • metFORMIN (GLUCOPHAGE) 500 MG tablet Take 500 mg by mouth Daily With Breakfast.     • Multiple Vitamins-Minerals (CENTRUM ADULTS PO) Take 1 tablet by mouth daily.     • Omega-3 Fatty Acids (FISH OIL PO) Take 1 tablet by mouth Daily.     • pantoprazole (PROTONIX) 40 MG EC tablet Take 1 tablet by mouth daily.     • simvastatin (ZOCOR) 10 MG tablet Take 10 mg by mouth Every Night.           Blood pressure 124/82, pulse 60, height 170.2 cm (67\"), weight 78.5 kg (173 lb).  Body mass index is 27.1 kg/m².    Physical Exam   Constitutional: He is oriented to person, place, and time. He appears well-developed and well-nourished.   HENT:   Head: Normocephalic and atraumatic.   Eyes: Pupils are equal, round, and reactive to light. No scleral icterus.   Neck: No JVD present. Carotid bruit is not present. No thyromegaly present.   Cardiovascular: Normal rate, regular rhythm, S1 normal and S2 normal.  Exam reveals no gallop.    No murmur heard.  Pulmonary/Chest: Effort normal and breath sounds normal.   Abdominal: Soft. There is no hepatosplenomegaly. There is no tenderness.   Neurological: He is alert and oriented to person, place, and time.   Skin: Skin is warm and dry. No cyanosis. Nails show no clubbing.   Psychiatric: He has a normal mood and affect. His behavior is normal.       Data Review:     Echo today shows low normal LVEF of 48%.  The bioprosthetic aortic valve functions normally. There is a trivial perivalvular leak present.     Procedures         Problem List Items Addressed This Visit        Cardiovascular and Mediastinum    Status post aortic valve " replacement with bioprosthetic valve    Overview     · Echocardiogram (08/08/2012): Moderate AS, KLAUDIA of 0.8 cm2.  · Echocardiogram (02/26/2013): LVEF 55%, severe AS with mean gradient of 65 mmHg.   · Bioprosthetic aortic valve replacement by Dr. Stuart Griffin (03/26/2013): 25-mm Magna Ease with ascending aortoplasty. Left atrial appendage excluded  · Echo (6/15/18): LVEF 48%. Normal functioning bioprosthetic aortic valve. Trivial paravalvular leak.         Current Assessment & Plan     · Echo today shows normal function bioprosthetic aortic valve with a trivial perivalvular leak.  · Patient has no symptoms of heart failure  · Repeat echocardiogram in 1-2 years         Coronary artery disease involving native coronary artery of native heart without angina pectoris - Primary    Overview     · Cardiac catheterization (03/06/2013): mild non obstructive CAD         Current Assessment & Plan     · Asymptomatic  · Continue low-dose aspirin and statin therapy         Essential hypertension    Current Assessment & Plan     · Controlled  · Continue present medical therapy         Hyperlipidemia LDL goal <70    Overview     · Statin therapy indicated given presence of coronary artery disease         Current Assessment & Plan     Continue simvastatin            Endocrine    Type 2 diabetes mellitus without complication, without long-term current use of insulin    Current Assessment & Plan     · Statin and ACE inhibitor therapy indicated given diabetic status                    · Continue present medical therapy  · Return to clinic in one year    Marshal Spangler IV, MD  6/15/2018

## 2018-09-20 ENCOUNTER — TELEPHONE (OUTPATIENT)
Dept: URGENT CARE | Facility: CLINIC | Age: 73
End: 2018-09-20

## 2019-03-22 PROBLEM — G40.409 GRAND MAL SEIZURE (HCC): Status: ACTIVE | Noted: 2019-03-22

## 2019-03-22 PROBLEM — Z86.79 HISTORY OF ATRIAL FLUTTER: Status: ACTIVE | Noted: 2019-03-22

## 2019-03-22 PROBLEM — N13.8 BPH WITH URINARY OBSTRUCTION: Status: ACTIVE | Noted: 2019-03-22

## 2019-03-22 PROBLEM — M17.0 PRIMARY OSTEOARTHRITIS OF BOTH KNEES: Status: ACTIVE | Noted: 2019-03-22

## 2019-03-22 PROBLEM — E78.00 HYPERCHOLESTEROLEMIA: Status: ACTIVE | Noted: 2019-03-22

## 2019-03-22 PROBLEM — E29.1 TESTICULAR HYPOFUNCTION: Status: ACTIVE | Noted: 2019-03-22

## 2019-03-22 PROBLEM — I44.0 FIRST DEGREE AV BLOCK: Status: ACTIVE | Noted: 2019-03-22

## 2019-03-22 PROBLEM — M17.10 ARTHRITIS OF KNEE: Status: ACTIVE | Noted: 2019-03-22

## 2019-03-22 PROBLEM — M19.012 PRIMARY OSTEOARTHRITIS OF LEFT SHOULDER: Status: ACTIVE | Noted: 2019-03-22

## 2019-03-22 PROBLEM — R73.9 HYPERGLYCEMIA: Status: ACTIVE | Noted: 2019-03-22

## 2019-03-22 PROBLEM — I05.9 MITRAL VALVE DISORDER: Status: ACTIVE | Noted: 2019-03-22

## 2019-03-22 PROBLEM — K21.9 CHRONIC GERD: Status: ACTIVE | Noted: 2019-03-22

## 2019-03-22 PROBLEM — I35.9 AORTIC VALVE DISORDER: Status: ACTIVE | Noted: 2019-03-22

## 2019-03-22 PROBLEM — M25.569 PAIN, KNEE: Status: ACTIVE | Noted: 2019-03-22

## 2019-03-22 PROBLEM — N40.1 BPH WITH URINARY OBSTRUCTION: Status: ACTIVE | Noted: 2019-03-22

## 2019-03-22 PROBLEM — M67.40 GANGLION: Status: ACTIVE | Noted: 2019-03-22

## 2019-03-22 PROBLEM — N52.8 OTHER MALE ERECTILE DYSFUNCTION: Status: ACTIVE | Noted: 2019-03-22

## 2019-04-10 ENCOUNTER — OFFICE VISIT (OUTPATIENT)
Dept: INTERNAL MEDICINE | Facility: CLINIC | Age: 74
End: 2019-04-10

## 2019-04-10 ENCOUNTER — LAB (OUTPATIENT)
Dept: LAB | Facility: HOSPITAL | Age: 74
End: 2019-04-10

## 2019-04-10 VITALS
SYSTOLIC BLOOD PRESSURE: 124 MMHG | HEART RATE: 52 BPM | BODY MASS INDEX: 27.15 KG/M2 | DIASTOLIC BLOOD PRESSURE: 82 MMHG | WEIGHT: 173 LBS | HEIGHT: 67 IN

## 2019-04-10 DIAGNOSIS — I10 ESSENTIAL HYPERTENSION: Primary | ICD-10-CM

## 2019-04-10 DIAGNOSIS — I10 ESSENTIAL HYPERTENSION: ICD-10-CM

## 2019-04-10 DIAGNOSIS — E11.9 TYPE 2 DIABETES MELLITUS WITHOUT COMPLICATION, WITHOUT LONG-TERM CURRENT USE OF INSULIN (HCC): ICD-10-CM

## 2019-04-10 DIAGNOSIS — E78.00 HYPERCHOLESTEROLEMIA: ICD-10-CM

## 2019-04-10 DIAGNOSIS — R56.9 SEIZURE (HCC): ICD-10-CM

## 2019-04-10 LAB
ALBUMIN SERPL-MCNC: 4.6 G/DL (ref 3.5–5.2)
ALBUMIN/GLOB SERPL: 1.8 G/DL
ALP SERPL-CCNC: 59 U/L (ref 39–117)
ALT SERPL W P-5'-P-CCNC: 24 U/L (ref 1–41)
ANION GAP SERPL CALCULATED.3IONS-SCNC: 8.2 MMOL/L
AST SERPL-CCNC: 27 U/L (ref 1–40)
BILIRUB SERPL-MCNC: 0.6 MG/DL (ref 0.2–1.2)
BUN BLD-MCNC: 13 MG/DL (ref 8–23)
BUN/CREAT SERPL: 10.9 (ref 7–25)
CALCIUM SPEC-SCNC: 9.7 MG/DL (ref 8.6–10.5)
CHLORIDE SERPL-SCNC: 101 MMOL/L (ref 98–107)
CHOLEST SERPL-MCNC: 134 MG/DL (ref 0–200)
CO2 SERPL-SCNC: 30.8 MMOL/L (ref 22–29)
CREAT BLD-MCNC: 1.19 MG/DL (ref 0.76–1.27)
GFR SERPL CREATININE-BSD FRML MDRD: 60 ML/MIN/1.73
GLOBULIN UR ELPH-MCNC: 2.6 GM/DL
GLUCOSE BLD-MCNC: 101 MG/DL (ref 65–99)
HBA1C MFR BLD: 6.22 % (ref 4.8–5.6)
HDLC SERPL-MCNC: 48 MG/DL (ref 40–60)
LDLC SERPL CALC-MCNC: 70 MG/DL (ref 0–100)
LDLC/HDLC SERPL: 1.46 {RATIO}
POTASSIUM BLD-SCNC: 4.4 MMOL/L (ref 3.5–5.2)
PROT SERPL-MCNC: 7.2 G/DL (ref 6–8.5)
SODIUM BLD-SCNC: 140 MMOL/L (ref 136–145)
TRIGL SERPL-MCNC: 80 MG/DL (ref 0–150)
VLDLC SERPL-MCNC: 16 MG/DL (ref 5–40)

## 2019-04-10 PROCEDURE — 80061 LIPID PANEL: CPT

## 2019-04-10 PROCEDURE — 99214 OFFICE O/P EST MOD 30 MIN: CPT | Performed by: INTERNAL MEDICINE

## 2019-04-10 PROCEDURE — 83036 HEMOGLOBIN GLYCOSYLATED A1C: CPT

## 2019-04-10 PROCEDURE — 80053 COMPREHEN METABOLIC PANEL: CPT

## 2019-04-10 RX ORDER — NYSTATIN 100000 U/G
CREAM TOPICAL DAILY
Qty: 30 G | Refills: 3 | Status: SHIPPED | OUTPATIENT
Start: 2019-04-10 | End: 2019-06-26

## 2019-04-10 RX ORDER — LISINOPRIL 10 MG/1
10 TABLET ORAL DAILY
Qty: 90 TABLET | Refills: 3 | Status: SHIPPED | OUTPATIENT
Start: 2019-04-10 | End: 2020-04-21 | Stop reason: SDUPTHER

## 2019-04-10 RX ORDER — LEVETIRACETAM 500 MG/1
250 TABLET ORAL NIGHTLY
Status: SHIPPED
Start: 2019-04-10 | End: 2019-06-04 | Stop reason: SDUPTHER

## 2019-04-10 NOTE — PROGRESS NOTES
[facility]     Christian Mcitnosh  1945   [id number]      Patient Care Team:  Merrill De La Paz MD as PCP - General (Internal Medicine)  Silvio Ahn MD as PCP - Claims Attributed  Marshal Spangler IV, MD as Cardiologist (Interventional Cardiology)    Chief Complaint::   Chief Complaint   Patient presents with   • Insomnia     dentist gave him a RX for amitriptyline 10 mg   • medication questions            HPI  Patient is a 73-year-old male with a history of essential hypertension on lisinopril 10 seizure disorder with last seizure several years ago he is currently on Keppra 250 at at bedtime with history of mixed hyperlipidemia on Zocor 10 mg without myalgias and history of diabetes type 2 on metformin 500 every morning.  She is having some difficulty with sleep insomnia dentist suggested and gave a prescription for amitriptyline 10 mg he initially took the medication and had a bit of sedation the next day informed that this was a low dose and that he should take this around 8 PM and that mild daytime sedation post therapy should resolve.  He also had a question about aspirin 81 mg and I suggested continue as he had no bleeding risk.  All patient feels well with no recent changes in diet medication or activity except the Elavil at 10 mg.      Patient Active Problem List   Diagnosis   • Coronary artery disease involving native coronary artery of native heart without angina pectoris   • Status post aortic valve replacement with bioprosthetic valve   • Hyperlipidemia LDL goal <70   • BPH (benign prostatic hyperplasia)   • Typical atrial flutter (CMS/HCC)   • Seizure (CMS/HCC)   • Cataracts, bilateral   • Tinnitus   • Essential hypertension   • Type 2 diabetes mellitus without complication, without long-term current use of insulin (CMS/HCC)   • Primary osteoarthritis of left shoulder   • Other male erectile dysfunction   • Pain, knee   • Grand mal seizure (CMS/HCC)   • Testicular  hypofunction   • First degree AV block   • Aortic valve disorder   • BPH with urinary obstruction   • Chronic GERD   • Primary osteoarthritis of both knees   • History of atrial flutter   • Hypercholesterolemia   • Mitral valve disorder   • Arthritis of knee   • Hyperglycemia   • Ganglion        Past Medical History:   Diagnosis Date   • Aortic stenosis    • Atrial flutter (CMS/HCC)     RFA by Dr. Padilla   • Benign prostatic hypertrophy w elevated PSA 01/03/2018    BX NO CANCER   • Bicuspid aortic valve    • Cataract    • Coronary artery disease 03/06/2013   • Diverticulosis 2002   • Gastric ulcer 2002   • GERD (gastroesophageal reflux disease)    • YR-Wigusbo-Nudts tear 07/23/2013   • H/O atrial flutter    • History of basal cell cancer 2007   • History of echocardiogram    • History of rotator cuff tear    • Hyperlipidemia    • Hypertension    • Osteoarthritis 2011   • PONV (postoperative nausea and vomiting)    • Seizure (CMS/HCC)    • Skin cancer     skin.  BASAL CELL SKIN CANCER LEFT CHEEK   • Status post shoulder surgery    • Tear of left rotator cuff    • Tinnitus    • Urinary retention     king       Past Surgical History:   Procedure Laterality Date   • BASAL CELL CARCINOMA EXCISION  2007   • BONE SPUR ARM     • CARDIAC ABLATION     • CARDIAC CATHETERIZATION  03/06/2013   • CARDIAC VALVE REPLACEMENT  03/26/2013    bioprosthetic aortic valve   • CARDIAC VALVE REPLACEMENT     • CARPAL TUNNEL RELEASE     • CATARACT EXTRACTION     • COLONOSCOPY     • COLONOSCOPY  08/29/2012   • CYST REMOVAL     • CYSTOSCOPY TRANSURETHRAL RESECTION OF PROSTATE N/A 1/3/2018    Procedure: PROSTATE ULTRASOUND AND BIOPSY, CYSTOSCOPY TRANSURETHRAL RESECTION OF PROSTATE GREENLIGHT WITH VAPORIZATION;  Surgeon: Silvio Ahn MD;  Location: [facility];  Service:    • ENDOSCOPY  2002    w/biopsy   • EYE SURGERY      bilateral cataract extraction   • KING CATHETER  11/01/2016   • HERNIA REPAIR  2010   • KNEE ARTHROSCOPY     • KNEE  "MENISCAL REPAIR Bilateral    • KNEE SURGERY Left    • LASIK     • PROSTATE BIOPSY  2018   • ROTATOR CUFF REPAIR  10/26/2016   • SHOULDER SURGERY Left    • SKIN CANCER EXCISION  2016    left cheek   • TURP / TRANSURETHRAL INCISION / DRAINAGE PROSTATE     • VASECTOMY         Family History   Problem Relation Age of Onset   • No Known Problems Mother    • Heart attack Father 60   • Coronary artery disease Father    • Heart disease Brother    • Diabetes Brother    • Diabetes Sister    • Other Son         -accident       Social History     Socioeconomic History   • Marital status:      Spouse name: Not on file   • Number of children: Not on file   • Years of education: Not on file   • Highest education level: Not on file   Tobacco Use   • Smoking status: Former Smoker     Packs/day: 2.00     Years: 20.00     Pack years: 40.00     Types: Cigarettes     Last attempt to quit:      Years since quittin.2   • Smokeless tobacco: Never Used   Substance and Sexual Activity   • Alcohol use: Yes     Comment: occasionally   • Drug use: No   • Sexual activity: Defer       Allergies   Allergen Reactions   • Shellfish-Derived Products Anaphylaxis       Review of Systems     HEENT: Denies headache or dizzy syncope or hearing change  NECK: Denies neck pain stiffness swelling dysphasia  CHEST: Denies cough or wheeze is a non-smoker  CARDIAC: Denies chest pain pressure palpitations  ABD: Denies nausea vomiting abdominal pain  : Denies dysuria frequency  NEURO: History of seizure none for the past few years on reduced doses of Keppra  PSYCH: Denies anxiety depression  EXTREM: Denies significant arthritic changes denies edema    Vital Signs  Vitals:    04/10/19 0950   BP: 124/82   Pulse: 52   Weight: 78.5 kg (173 lb)   Height: 170.2 cm (67\")   PainSc: 0-No pain         Current Outpatient Medications:   •  aspirin 81 MG tablet, Take 1 tablet by mouth Daily., Disp: 90 tablet, Rfl: 3  •  Cholecalciferol " (VITAMIN D3) 5000 units capsule capsule, Take 5,000 Units by mouth Daily., Disp: , Rfl:   •  Coenzyme Q10 (CO Q 10) 100 MG capsule, Take 200 mg by mouth Daily., Disp: , Rfl:   •  DHEA 25 MG capsule, Take 25 mg by mouth Daily., Disp: , Rfl:   •  Docusate Sodium (STOOL SOFTENER) 100 MG capsule, Take 1 tablet by mouth daily., Disp: , Rfl:   •  levETIRAcetam (KEPPRA) 500 MG tablet, Take 0.5 tablets by mouth Every Night., Disp: , Rfl:   •  lisinopril (PRINIVIL,ZESTRIL) 10 MG tablet, Take 1 tablet by mouth Daily., Disp: 90 tablet, Rfl: 3  •  metFORMIN (GLUCOPHAGE) 500 MG tablet, Take 500 mg by mouth Daily With Breakfast., Disp: , Rfl:   •  Multiple Vitamins-Minerals (CENTRUM ADULTS PO), Take 1 tablet by mouth daily., Disp: , Rfl:   •  Omega-3 Fatty Acids (FISH OIL PO), Take 1 tablet by mouth Daily., Disp: , Rfl:   •  pantoprazole (PROTONIX) 40 MG EC tablet, Take 1 tablet by mouth daily., Disp: , Rfl:   •  simvastatin (ZOCOR) 10 MG tablet, Take 10 mg by mouth Every Night., Disp: , Rfl:   •  nystatin (MYCOSTATIN) 743098 UNIT/GM cream, Apply  topically to the appropriate area as directed Daily., Disp: 30 g, Rfl: 3  •  promethazine-codeine (PHENERGAN with CODEINE) 6.25-10 MG/5ML syrup, Take 5 mL by mouth Every 4 (Four) Hours As Needed for Cough., Disp: 90 mL, Rfl: 0    Physical Exam    Christian had a diabetic foot exam performed today.   During the foot exam he had a monofilament test performed.    Neurological Sensory Findings - Unaltered hot/cold right ankle/foot discrimination and unaltered hot/cold left ankle/foot discrimination. Unaltered sharp/dull right ankle/foot discrimination and unaltered sharp/dull left ankle/foot discrimination. No right ankle/foot altered proprioception and no left ankle/foot altered proprioception  Vascular Status -  His right foot exhibits normal foot vasculature  and no edema. His left foot exhibits normal foot vasculature  and no edema.       ACE III MINI         HEENT: Pupils equal reactive  ENT clear no facial asymmetry pharynx is clear  NECK: No mass or bruit or thyromegaly  CHEST: Clear to P&A  CARDIAC: Regular rhythm no gallop or rub  ABD: Liver and spleen normal positive bowel sounds no bruit  :   NEURO: CNS is intact no neuropathy  PSYCH: No evidence of anxiety depression  EXTREM: Minimal arthritic change no edema  Skin: Clear except for perirectal irritation     Results Review:    Fasting lab pending  Procedures    Medication Review: Medications reviewed and noted    Patient wel lness counseling  Exercise: Continue exercise walking his best exercise  Diet: Healthy cardiac diet with reduced carbs  Smoking: Non-smoker  Alcohol: None alcohol  Screening: Last colonoscopy October 21, 2015    Assessment/Plan:    Christian was seen today for insomnia and medication questions.    Diagnoses and all orders for this visit:    Essential hypertension  Comments:  Blood pressure 124/82 in good control on lisinopril 10 mg daily no change in therapy  Orders:  -     Comprehensive Metabolic Panel; Future    Hypercholesterolemia  Comments:  Patient with a history of mixed hyperlipidemia on Zocor 10 mg daily denies myalgia no change in therapy fasting lab pending  Orders:  -     Lipid Panel; Future    Type 2 diabetes mellitus without complication, without long-term current use of insulin (CMS/[facility])  Comments:  2 diabetes type 2 on metformin 500 mg daily fasting lab of CMP and A1c are pending change therapy  Orders:  -     Microalbumin / Creatinine Urine Ratio - Urine, Clean Catch; Future  -     Hemoglobin A1c; Future    Seizure (CMS/[facility])  Comments:  Remote seizure disorder originally on Keppra 500 mg twice daily currently on 250 at at bedtime no seizures.    Other orders  -     lisinopril (PRINIVIL,ZESTRIL) 10 MG tablet; Take 1 tablet by mouth Daily.  -     nystatin (MYCOSTATIN) 984382 UNIT/GM cream; Apply  topically to the appropriate area as directed Daily.  -     levETIRAcetam (KEPPRA) 500 MG tablet; Take 0.5 tablets  by mouth Every Night.        Patient Instructions   Await fasting labs  Continue current medical therapy for lisinopril 10 mg  Add nystatin cream to perirectal area daily and clean area with Tucks  Return visit in 6 months or as needed       Plan of care reviewed with patient at the conclusion of today's visit. Education was provided regarding diagnosis, management, and any prescribed or recommended OTC medications.Patient verbalizes understanding of and agreement with management plan.         Merrill De La Paz MD      Note: Speech recognition transcription software was used to dictate portions of this document.  An attempt at proofreading has been made though minor errors in transcription may still be present.  Please do not hesitate to call our office with any questions.

## 2019-04-11 NOTE — PATIENT INSTRUCTIONS
Await fasting labs  Continue current medical therapy for lisinopril 10 mg  Add nystatin cream to perirectal area daily and clean area with Tucks  Return visit in 6 months or as needed

## 2019-04-12 RX ORDER — ACETAMINOPHEN/DIPHENHYDRAMINE 500MG-25MG
TABLET ORAL
Qty: 90 TABLET | Refills: 2 | Status: SHIPPED | OUTPATIENT
Start: 2019-04-12 | End: 2019-06-26

## 2019-04-17 ENCOUNTER — LAB (OUTPATIENT)
Dept: LAB | Facility: HOSPITAL | Age: 74
End: 2019-04-17

## 2019-04-17 DIAGNOSIS — E11.9 TYPE 2 DIABETES MELLITUS WITHOUT COMPLICATION, WITHOUT LONG-TERM CURRENT USE OF INSULIN (HCC): ICD-10-CM

## 2019-04-17 LAB
ALBUMIN UR-MCNC: 2.6 MG/L
CREAT UR-MCNC: 199.1 MG/DL
MICROALBUMIN/CREAT UR: 13.1 MG/G

## 2019-04-17 PROCEDURE — 82043 UR ALBUMIN QUANTITATIVE: CPT

## 2019-04-17 PROCEDURE — 82570 ASSAY OF URINE CREATININE: CPT

## 2019-05-07 RX ORDER — SIMVASTATIN 10 MG
10 TABLET ORAL EVERY EVENING
Qty: 90 TABLET | Refills: 2 | Status: SHIPPED | OUTPATIENT
Start: 2019-05-07 | End: 2019-06-26 | Stop reason: SDUPTHER

## 2019-05-08 RX ORDER — AMOXICILLIN 500 MG/1
CAPSULE ORAL
Qty: 4 CAPSULE | Refills: 1 | Status: SHIPPED | OUTPATIENT
Start: 2019-05-08 | End: 2020-02-10

## 2019-05-08 RX ORDER — PANTOPRAZOLE SODIUM 40 MG/1
TABLET, DELAYED RELEASE ORAL
Qty: 90 TABLET | Refills: 3 | Status: SHIPPED | OUTPATIENT
Start: 2019-05-08 | End: 2020-04-21 | Stop reason: SDUPTHER

## 2019-06-04 RX ORDER — LEVETIRACETAM 500 MG/1
TABLET ORAL
Qty: 45 TABLET | Refills: 3 | Status: SHIPPED | OUTPATIENT
Start: 2019-06-04 | End: 2020-05-26

## 2019-06-26 ENCOUNTER — OFFICE VISIT (OUTPATIENT)
Dept: CARDIOLOGY | Facility: CLINIC | Age: 74
End: 2019-06-26

## 2019-06-26 VITALS
WEIGHT: 171 LBS | SYSTOLIC BLOOD PRESSURE: 108 MMHG | HEIGHT: 67 IN | DIASTOLIC BLOOD PRESSURE: 68 MMHG | OXYGEN SATURATION: 97 % | HEART RATE: 63 BPM | BODY MASS INDEX: 26.84 KG/M2

## 2019-06-26 DIAGNOSIS — I25.10 CORONARY ARTERY DISEASE INVOLVING NATIVE CORONARY ARTERY OF NATIVE HEART WITHOUT ANGINA PECTORIS: ICD-10-CM

## 2019-06-26 DIAGNOSIS — E11.9 TYPE 2 DIABETES MELLITUS WITHOUT COMPLICATION, WITHOUT LONG-TERM CURRENT USE OF INSULIN (HCC): Primary | ICD-10-CM

## 2019-06-26 DIAGNOSIS — E78.5 HYPERLIPIDEMIA LDL GOAL <70: ICD-10-CM

## 2019-06-26 DIAGNOSIS — I10 ESSENTIAL HYPERTENSION: ICD-10-CM

## 2019-06-26 DIAGNOSIS — Z95.3 STATUS POST AORTIC VALVE REPLACEMENT WITH BIOPROSTHETIC VALVE: ICD-10-CM

## 2019-06-26 PROBLEM — R73.9 HYPERGLYCEMIA: Status: RESOLVED | Noted: 2019-03-22 | Resolved: 2019-06-26

## 2019-06-26 PROBLEM — I05.9 MITRAL VALVE DISORDER: Status: RESOLVED | Noted: 2019-03-22 | Resolved: 2019-06-26

## 2019-06-26 PROBLEM — E78.00 HYPERCHOLESTEROLEMIA: Status: RESOLVED | Noted: 2019-03-22 | Resolved: 2019-06-26

## 2019-06-26 PROBLEM — I35.9 AORTIC VALVE DISORDER: Status: RESOLVED | Noted: 2019-03-22 | Resolved: 2019-06-26

## 2019-06-26 PROBLEM — M17.10 ARTHRITIS OF KNEE: Status: RESOLVED | Noted: 2019-03-22 | Resolved: 2019-06-26

## 2019-06-26 PROBLEM — M67.40 GANGLION: Status: RESOLVED | Noted: 2019-03-22 | Resolved: 2019-06-26

## 2019-06-26 PROBLEM — Z86.79 HISTORY OF ATRIAL FLUTTER: Status: RESOLVED | Noted: 2019-03-22 | Resolved: 2019-06-26

## 2019-06-26 PROCEDURE — 99214 OFFICE O/P EST MOD 30 MIN: CPT | Performed by: INTERNAL MEDICINE

## 2019-06-26 RX ORDER — SIMVASTATIN 10 MG
10 TABLET ORAL EVERY EVENING
Qty: 90 TABLET | Refills: 3 | Status: SHIPPED | OUTPATIENT
Start: 2019-06-26 | End: 2020-04-06 | Stop reason: SDUPTHER

## 2019-06-26 NOTE — PROGRESS NOTES
Encounter Date:06/26/2019    Patient ID: Christian Mcintosh is a 74 y.o. male who resides in [place], [place].     CC/Reason for visit:  Coronary Artery Disease           Problem List Items Addressed This Visit        Cardiology Problems    Status post aortic valve replacement with bioprosthetic valve    Overview     · Echocardiogram (08/08/2012): Moderate AS, KLAUDIA of 0.8 cm2.  · Echocardiogram (02/26/2013): LVEF 55%, severe AS with mean gradient of 65 mmHg.   · Bioprosthetic aortic valve replacement by Dr. Stuart Griffin (03/26/2013): 25-mm Magna Ease with ascending aortoplasty. Left atrial appendage excluded  · Echo (6/15/18): LVEF 48%. Normal functioning bioprosthetic aortic valve. Trivial paravalvular leak.         Current Assessment & Plan     · No symptoms  · Continue low dose aspirin  · Repeat echo in 2021         Coronary artery disease involving native coronary artery of native heart without angina pectoris    Overview     · Cardiac catheterization (03/06/2013): mild non obstructive CAD         Current Assessment & Plan     · No anginal symptoms  · Continue aspirin and statin therapy         Hyperlipidemia LDL goal <70    Overview     · Statin therapy indicated given presence of coronary artery disease         Current Assessment & Plan     · Controlled  · Continue present medical therapy         Essential hypertension    Current Assessment & Plan     · Controlled  · Continue present medical therapy            Other    Type 2 diabetes mellitus without complication, without long-term current use of insulin (CMS/[facility]) - Primary               · Continue present medical therapy  Return in about 12 months (around 6/26/2020) for Follow-up with [facility] only.            Christian Mcintosh returns today for his 12 month follow up for his status post aortic valve replacement, coronary artery disease, hypertension and hyperlipidemia.  The patient has been doing well from a cardiovascular standpoint.  He denies shortness of breath  or angina when he is exercising, something he does on a daily basis.  No TIA or stroke symptoms no palpitations or syncope.  His echocardiogram performed 1 year ago showed normal functioning bioprosthetic aortic valve.    Review of Systems   Constitution: Negative for weakness and malaise/fatigue.   HENT: Positive for hearing loss and tinnitus.    Eyes: Negative for vision loss in left eye and vision loss in right eye.   Cardiovascular: Negative for chest pain, dyspnea on exertion, near-syncope, orthopnea, palpitations, paroxysmal nocturnal dyspnea and syncope.   Musculoskeletal: Positive for arthritis. Negative for myalgias.   Neurological: Positive for excessive daytime sleepiness. Negative for brief paralysis, focal weakness, numbness and paresthesias.   Psychiatric/Behavioral: Positive for altered mental status.        Decreased concentration   Allergic/Immunologic:        Food allergies   All other systems reviewed and are negative.      The patient's past medical, social, family history and ROS reviewed in the patient's electronic medical record.    Allergies  Shellfish-derived products    Outpatient Medications Marked as Taking for the 6/26/19 encounter (Office Visit) with Marshal Spangler IV, MD   Medication Sig Dispense Refill   • AMITRIPTYLINE HCL PO Take  by mouth Daily As Needed.     • amoxicillin (AMOXIL) 500 MG capsule take 4 capsules by mouth PRIOR TO DENTAL PROCEDURE 4 capsule 1   • aspirin 81 MG tablet Take 1 tablet by mouth Daily. 90 tablet 3   • Cholecalciferol (VITAMIN D3) 5000 units capsule capsule Take 5,000 Units by mouth Daily.     • Coenzyme Q10 (CO Q 10) 100 MG capsule Take 200 mg by mouth Daily.     • DHEA 25 MG capsule Take 25 mg by mouth Daily.     • Docusate Sodium (STOOL SOFTENER) 100 MG capsule Take 1 tablet by mouth daily.     • levETIRAcetam (KEPPRA) 500 MG tablet take 1/2 tablet by mouth once daily 45 tablet 3   • lisinopril (PRINIVIL,ZESTRIL) 10 MG tablet Take 1 tablet by  "mouth Daily. 90 tablet 3   • metFORMIN (GLUCOPHAGE) 500 MG tablet take 1 tablet by mouth once daily 90 tablet 3   • Multiple Vitamins-Minerals (CENTRUM ADULTS PO) Take 1 tablet by mouth daily.     • Omega-3 Fatty Acids (FISH OIL PO) Take 1 tablet by mouth Daily.     • pantoprazole (PROTONIX) 40 MG EC tablet take 1 tablet by mouth once daily 90 tablet 3   • simvastatin (ZOCOR) 10 MG tablet Take 1 tablet by mouth Every Evening. 90 tablet 2           Blood pressure 108/68, pulse 63, height 170.2 cm (67\"), weight 77.6 kg (171 lb), SpO2 97 %.  Body mass index is 26.78 kg/m².  There were no vitals filed for this visit.    Physical Exam   Constitutional: He is oriented to person, place, and time. He appears well-developed and well-nourished.   HENT:   Head: Normocephalic and atraumatic.   Eyes: Pupils are equal, round, and reactive to light. No scleral icterus.   Neck: No JVD present. Carotid bruit is not present. No thyromegaly present.   Cardiovascular: Normal rate, regular rhythm, S1 normal and S2 normal. Exam reveals no gallop.   No murmur heard.  Pulmonary/Chest: Effort normal and breath sounds normal.   Abdominal: Soft. There is no hepatosplenomegaly. There is no tenderness.   Neurological: He is alert and oriented to person, place, and time.   Skin: Skin is warm and dry. No cyanosis. Nails show no clubbing.   Psychiatric: He has a normal mood and affect. His behavior is normal.       Data Review (reviewed with patient):     Procedures    Lab Results   Component Value Date    CHOL 134 04/10/2019    TRIG 80 04/10/2019    HDL 48 04/10/2019    LDL 70 04/10/2019    AST 27 04/10/2019    ALT 24 04/10/2019       Lab Results   Component Value Date    HGBA1C 6.22 (H) 04/10/2019     Lab Results   Component Value Date    GLUCOSE 101 (H) 04/10/2019    BUN 13 04/10/2019    CREATININE 1.19 04/10/2019    EGFRIFNONA 60 (L) 04/10/2019    BCR 10.9 04/10/2019    K 4.4 04/10/2019    CO2 30.8 (H) 04/10/2019    CALCIUM 9.7 04/10/2019    " ALBUMIN 4.60 04/10/2019    AST 27 04/10/2019    ALT 24 04/10/2019       HGurpreet Spangler MD [facility]  Interventional and General Cardiology    6/26/2019

## 2019-09-30 ENCOUNTER — TELEPHONE (OUTPATIENT)
Dept: INTERNAL MEDICINE | Facility: CLINIC | Age: 74
End: 2019-09-30

## 2019-10-01 DIAGNOSIS — Z12.5 PROSTATE CANCER SCREENING: ICD-10-CM

## 2019-10-01 DIAGNOSIS — I25.10 CORONARY ARTERY DISEASE INVOLVING NATIVE CORONARY ARTERY OF NATIVE HEART WITHOUT ANGINA PECTORIS: ICD-10-CM

## 2019-10-01 DIAGNOSIS — E78.5 HYPERLIPIDEMIA LDL GOAL <70: Primary | ICD-10-CM

## 2019-10-01 DIAGNOSIS — E11.9 TYPE 2 DIABETES MELLITUS WITHOUT COMPLICATION, WITHOUT LONG-TERM CURRENT USE OF INSULIN (HCC): ICD-10-CM

## 2019-10-08 ENCOUNTER — TRANSCRIBE ORDERS (OUTPATIENT)
Dept: LAB | Facility: HOSPITAL | Age: 74
End: 2019-10-08

## 2019-10-08 ENCOUNTER — OFFICE VISIT (OUTPATIENT)
Dept: INTERNAL MEDICINE | Facility: CLINIC | Age: 74
End: 2019-10-08

## 2019-10-08 ENCOUNTER — LAB (OUTPATIENT)
Dept: LAB | Facility: HOSPITAL | Age: 74
End: 2019-10-08

## 2019-10-08 VITALS
DIASTOLIC BLOOD PRESSURE: 86 MMHG | BODY MASS INDEX: 26.53 KG/M2 | TEMPERATURE: 98.4 F | WEIGHT: 169 LBS | HEART RATE: 64 BPM | HEIGHT: 67 IN | SYSTOLIC BLOOD PRESSURE: 130 MMHG

## 2019-10-08 DIAGNOSIS — E29.1 3-OXO-5 ALPHA-STEROID DELTA 4-DEHYDROGENASE DEFICIENCY: ICD-10-CM

## 2019-10-08 DIAGNOSIS — E61.1 IRON DEFICIENCY: ICD-10-CM

## 2019-10-08 DIAGNOSIS — E55.9 AVITAMINOSIS D: ICD-10-CM

## 2019-10-08 DIAGNOSIS — Z00.00 MEDICARE ANNUAL WELLNESS VISIT, SUBSEQUENT: Primary | ICD-10-CM

## 2019-10-08 DIAGNOSIS — Z00.00 ROUTINE GENERAL MEDICAL EXAMINATION AT A HEALTH CARE FACILITY: ICD-10-CM

## 2019-10-08 DIAGNOSIS — E03.4 IDIOPATHIC ATROPHIC HYPOTHYROIDISM: ICD-10-CM

## 2019-10-08 DIAGNOSIS — Z00.00 ROUTINE GENERAL MEDICAL EXAMINATION AT A HEALTH CARE FACILITY: Primary | ICD-10-CM

## 2019-10-08 DIAGNOSIS — Z12.5 PROSTATE CANCER SCREENING: ICD-10-CM

## 2019-10-08 DIAGNOSIS — I25.10 CORONARY ARTERY DISEASE INVOLVING NATIVE CORONARY ARTERY OF NATIVE HEART WITHOUT ANGINA PECTORIS: ICD-10-CM

## 2019-10-08 DIAGNOSIS — E11.9 TYPE 2 DIABETES MELLITUS WITHOUT COMPLICATION, WITHOUT LONG-TERM CURRENT USE OF INSULIN (HCC): ICD-10-CM

## 2019-10-08 DIAGNOSIS — Z13.220 SCREENING FOR LIPOID DISORDERS: ICD-10-CM

## 2019-10-08 DIAGNOSIS — E78.5 HYPERLIPIDEMIA LDL GOAL <70: ICD-10-CM

## 2019-10-08 LAB
ALBUMIN SERPL-MCNC: 4.9 G/DL (ref 3.5–5.2)
ALBUMIN UR-MCNC: <1.2 MG/DL
ALBUMIN/GLOB SERPL: 2.1 G/DL
ALP SERPL-CCNC: 65 U/L (ref 39–117)
ALT SERPL W P-5'-P-CCNC: 24 U/L (ref 1–41)
ANION GAP SERPL CALCULATED.3IONS-SCNC: 11.9 MMOL/L (ref 5–15)
AST SERPL-CCNC: 30 U/L (ref 1–40)
BASOPHILS # BLD AUTO: 0.09 10*3/MM3 (ref 0–0.2)
BASOPHILS # BLD AUTO: 0.09 10*3/MM3 (ref 0–0.2)
BASOPHILS NFR BLD AUTO: 1 % (ref 0–1.5)
BASOPHILS NFR BLD AUTO: 1 % (ref 0–1.5)
BILIRUB SERPL-MCNC: 0.7 MG/DL (ref 0.2–1.2)
BUN BLD-MCNC: 14 MG/DL (ref 8–23)
BUN/CREAT SERPL: 10.6 (ref 7–25)
CALCIUM SPEC-SCNC: 9.7 MG/DL (ref 8.6–10.5)
CHLORIDE SERPL-SCNC: 99 MMOL/L (ref 98–107)
CHOLEST SERPL-MCNC: 140 MG/DL (ref 0–200)
CO2 SERPL-SCNC: 28.1 MMOL/L (ref 22–29)
CREAT BLD-MCNC: 1.32 MG/DL (ref 0.76–1.27)
CREAT UR-MCNC: 99.6 MG/DL
DEPRECATED RDW RBC AUTO: 41.2 FL (ref 37–54)
DEPRECATED RDW RBC AUTO: 41.3 FL (ref 37–54)
EOSINOPHIL # BLD AUTO: 0.38 10*3/MM3 (ref 0–0.4)
EOSINOPHIL # BLD AUTO: 0.38 10*3/MM3 (ref 0–0.4)
EOSINOPHIL NFR BLD AUTO: 4.3 % (ref 0.3–6.2)
EOSINOPHIL NFR BLD AUTO: 4.3 % (ref 0.3–6.2)
ERYTHROCYTE [DISTWIDTH] IN BLOOD BY AUTOMATED COUNT: 12.7 % (ref 12.3–15.4)
ERYTHROCYTE [DISTWIDTH] IN BLOOD BY AUTOMATED COUNT: 13.2 % (ref 12.3–15.4)
ESTRADIOL SERPL HS-MCNC: 13.7 PG/ML
GFR SERPL CREATININE-BSD FRML MDRD: 53 ML/MIN/1.73
GLOBULIN UR ELPH-MCNC: 2.3 GM/DL
GLUCOSE BLD-MCNC: 98 MG/DL (ref 65–99)
HBA1C MFR BLD: 6.1 % (ref 4.8–5.6)
HCT VFR BLD AUTO: 53.6 % (ref 37.5–51)
HCT VFR BLD AUTO: 54.3 % (ref 37.5–51)
HDLC SERPL-MCNC: 45 MG/DL (ref 40–60)
HGB BLD-MCNC: 17.7 G/DL (ref 13–17.7)
HGB BLD-MCNC: 17.8 G/DL (ref 13–17.7)
IMM GRANULOCYTES # BLD AUTO: 0.03 10*3/MM3 (ref 0–0.05)
IMM GRANULOCYTES # BLD AUTO: 0.04 10*3/MM3 (ref 0–0.05)
IMM GRANULOCYTES NFR BLD AUTO: 0.3 % (ref 0–0.5)
IMM GRANULOCYTES NFR BLD AUTO: 0.5 % (ref 0–0.5)
LDLC SERPL CALC-MCNC: 80 MG/DL (ref 0–100)
LDLC/HDLC SERPL: 1.79 {RATIO}
LYMPHOCYTES # BLD AUTO: 1.96 10*3/MM3 (ref 0.7–3.1)
LYMPHOCYTES # BLD AUTO: 2 10*3/MM3 (ref 0.7–3.1)
LYMPHOCYTES NFR BLD AUTO: 22.3 % (ref 19.6–45.3)
LYMPHOCYTES NFR BLD AUTO: 22.5 % (ref 19.6–45.3)
MCH RBC QN AUTO: 29.1 PG (ref 26.6–33)
MCH RBC QN AUTO: 29.1 PG (ref 26.6–33)
MCHC RBC AUTO-ENTMCNC: 32.8 G/DL (ref 31.5–35.7)
MCHC RBC AUTO-ENTMCNC: 33 G/DL (ref 31.5–35.7)
MCV RBC AUTO: 88 FL (ref 79–97)
MCV RBC AUTO: 88.9 FL (ref 79–97)
MICROALBUMIN/CREAT UR: NORMAL MG/G{CREAT}
MONOCYTES # BLD AUTO: 0.74 10*3/MM3 (ref 0.1–0.9)
MONOCYTES # BLD AUTO: 0.8 10*3/MM3 (ref 0.1–0.9)
MONOCYTES NFR BLD AUTO: 8.4 % (ref 5–12)
MONOCYTES NFR BLD AUTO: 9 % (ref 5–12)
NEUTROPHILS # BLD AUTO: 5.57 10*3/MM3 (ref 1.7–7)
NEUTROPHILS # BLD AUTO: 5.58 10*3/MM3 (ref 1.7–7)
NEUTROPHILS NFR BLD AUTO: 62.9 % (ref 42.7–76)
NEUTROPHILS NFR BLD AUTO: 63.5 % (ref 42.7–76)
NRBC BLD AUTO-RTO: 0 /100 WBC (ref 0–0.2)
NRBC BLD AUTO-RTO: 0 /100 WBC (ref 0–0.2)
PLATELET # BLD AUTO: 191 10*3/MM3 (ref 140–450)
PLATELET # BLD AUTO: 200 10*3/MM3 (ref 140–450)
PMV BLD AUTO: 11.2 FL (ref 6–12)
PMV BLD AUTO: 11.4 FL (ref 6–12)
POTASSIUM BLD-SCNC: 4.6 MMOL/L (ref 3.5–5.2)
PROT SERPL-MCNC: 7.2 G/DL (ref 6–8.5)
PSA SERPL-MCNC: 2.03 NG/ML (ref 0–4)
RBC # BLD AUTO: 6.09 10*6/MM3 (ref 4.14–5.8)
RBC # BLD AUTO: 6.11 10*6/MM3 (ref 4.14–5.8)
SODIUM BLD-SCNC: 139 MMOL/L (ref 136–145)
TRIGL SERPL-MCNC: 73 MG/DL (ref 0–150)
TSH SERPL DL<=0.05 MIU/L-ACNC: 1.38 UIU/ML (ref 0.27–4.2)
VLDLC SERPL-MCNC: 14.6 MG/DL (ref 5–40)
WBC NRBC COR # BLD: 8.78 10*3/MM3 (ref 3.4–10.8)
WBC NRBC COR # BLD: 8.88 10*3/MM3 (ref 3.4–10.8)

## 2019-10-08 PROCEDURE — G0439 PPPS, SUBSEQ VISIT: HCPCS | Performed by: NURSE PRACTITIONER

## 2019-10-08 PROCEDURE — 85025 COMPLETE CBC W/AUTO DIFF WBC: CPT

## 2019-10-08 PROCEDURE — 80053 COMPREHEN METABOLIC PANEL: CPT

## 2019-10-08 PROCEDURE — 80061 LIPID PANEL: CPT

## 2019-10-08 PROCEDURE — 84443 ASSAY THYROID STIM HORMONE: CPT

## 2019-10-08 PROCEDURE — 83036 HEMOGLOBIN GLYCOSYLATED A1C: CPT

## 2019-10-08 PROCEDURE — G0103 PSA SCREENING: HCPCS

## 2019-10-08 PROCEDURE — 84402 ASSAY OF FREE TESTOSTERONE: CPT

## 2019-10-08 PROCEDURE — 82570 ASSAY OF URINE CREATININE: CPT

## 2019-10-08 PROCEDURE — 82043 UR ALBUMIN QUANTITATIVE: CPT

## 2019-10-08 PROCEDURE — 36415 COLL VENOUS BLD VENIPUNCTURE: CPT

## 2019-10-08 PROCEDURE — 84410 TESTOSTERONE BIOAVAILABLE: CPT

## 2019-10-08 PROCEDURE — 84403 ASSAY OF TOTAL TESTOSTERONE: CPT

## 2019-10-08 PROCEDURE — 82670 ASSAY OF TOTAL ESTRADIOL: CPT

## 2019-10-08 RX ORDER — ANASTROZOLE 1 MG/1
1 TABLET ORAL 2 TIMES WEEKLY
COMMUNITY
End: 2022-05-10

## 2019-10-08 RX ORDER — SILDENAFIL CITRATE 20 MG/1
20 TABLET ORAL AS NEEDED
COMMUNITY
End: 2020-11-05 | Stop reason: SDUPTHER

## 2019-10-08 RX ORDER — AMITRIPTYLINE HYDROCHLORIDE 10 MG/1
1 TABLET, FILM COATED ORAL DAILY
Refills: 0 | COMMUNITY
Start: 2019-08-27 | End: 2022-05-10 | Stop reason: SDDI

## 2019-10-08 RX ORDER — ACETAMINOPHEN/DIPHENHYDRAMINE 500MG-25MG
1 TABLET ORAL DAILY
Refills: 0 | COMMUNITY
Start: 2019-10-07 | End: 2019-10-08

## 2019-10-08 RX ORDER — VITAMIN B COMPLEX
1 CAPSULE ORAL DAILY
COMMUNITY

## 2019-10-08 NOTE — PATIENT INSTRUCTIONS
Medicare Wellness  Personal Prevention Plan of Service     Date of Office Visit:  10/08/2019  Encounter Provider:  RITCHIE Denney  Place of Service:  [facility] INTER[facility] MEDICINE  Patient Name: Christian Mcintosh  :  1945    As part of the Medicare Wellness portion of your visit today, we are providing you with this personalized preventive plan of services (PPPS). This plan is based upon recommendations of the [place] Preventive Services Task Force (USPSTF) and the Advisory Committee on Immunization Practices (ACIP).    This lists the preventive care services that should be considered, and provides dates of when you are due. Items listed as completed are up-to-date and do not require any further intervention.    Health Maintenance   Topic Date Due   • ZOSTER VACCINE (2 of 3) 2015   • PNEUMOCOCCAL VACCINES (65+ LOW/MEDIUM RISK) (2 of 2 - PPSV23) 2016   • HEPATITIS C SCREENING  2017   • DIABETIC EYE EXAM  2019   • AAA SCREEN (ONE-TIME)  04/10/2019   • INFLUENZA VACCINE  2019   • MEDICARE ANNUAL WELLNESS  10/02/2019   • HEMOGLOBIN A1C  10/10/2019   • DIABETIC FOOT EXAM  04/10/2020   • LIPID PANEL  04/10/2020   • URINE MICROALBUMIN  2020   • TDAP/TD VACCINES (2 - Td) 01/10/2021   • COLONOSCOPY  10/21/2025       No orders of the defined types were placed in this encounter.      Return for Annual physical, Labs this visit, Next scheduled follow up.

## 2019-10-08 NOTE — PROGRESS NOTES
The ABCs of the Annual Wellness Visit  Subsequent Medicare Wellness Visit    Chief Complaint   Patient presents with   • Annual Exam     Patient had blood work done this morning        Subjective   History of Present Illness:  Christian Mcintosh is a 74 y.o. male who presents for a Subsequent Medicare Wellness Visit.    HEALTH RISK ASSESSMENT    Recent Hospitalizations:  No hospitalization(s) within the last year.    Current Medical Providers:  Patient Care Team:  Merrill De La Paz MD as PCP - General (Internal Medicine)  Merrill De La Paz MD as PCP - Claims Attributed  Marshal Spangler IV, MD as Cardiologist (Interventional Cardiology)    Smoking Status:  Social History     Tobacco Use   Smoking Status Former Smoker   • Packs/day: 2.00   • Years: 20.00   • Pack years: 40.00   • Types: Cigarettes   • Last attempt to quit:    • Years since quittin.7   Smokeless Tobacco Never Used       Alcohol Consumption:  Social History     Substance and Sexual Activity   Alcohol Use Yes   • Alcohol/week: 1.2 oz   • Types: 2 Shots of liquor per week       Depression Screen:   PHQ-2/PHQ-9 Depression Screening 10/8/2019   Little interest or pleasure in doing things 1   Feeling down, depressed, or hopeless 1   Trouble falling or staying asleep, or sleeping too much 2   Feeling tired or having little energy 1   Poor appetite or overeating 1   Feeling bad about yourself - or that you are a failure or have let yourself or your family down 0   Trouble concentrating on things, such as reading the newspaper or watching television 0   Moving or speaking so slowly that other people could have noticed. Or the opposite - being so fidgety or restless that you have been moving around a lot more than usual 0   Thoughts that you would be better off dead, or of hurting yourself in some way 0   Total Score 6   If you checked off any problems, how difficult have these problems made it for you to do your work, take care of things at home, or  get along with other people? Not difficult at all       Fall Risk Screen:  BEBETO Fall Risk Assessment was completed, and patient is at LOW risk for falls.Assessment completed on:10/8/2019    Health Habits and Functional and Cognitive Screening:  Functional & Cognitive Status 10/8/2019   Do you have difficulty preparing food and eating? No   Do you have difficulty bathing yourself, getting dressed or grooming yourself? No   Do you have difficulty using the toilet? No   Do you have difficulty moving around from place to place? No   Do you have trouble with steps or getting out of a bed or a chair? No   Current Diet Unhealthy Diet   Dental Exam Up to date   Eye Exam Up to date   Exercise (times per week) 5 times per week   Current Exercise Activities Include Light Weight/Kettebells   Do you need help using the phone?  No   Are you deaf or do you have serious difficulty hearing?  No   Do you need help with transportation? No   Do you need help shopping? No   Do you need help preparing meals?  No   Do you need help with housework?  No   Do you need help with laundry? No   Do you need help taking your medications? No   Do you need help managing money? No   Do you ever drive or ride in a car without wearing a seat belt? No   Have you felt unusual stress, anger or loneliness in the last month? Yes   Who do you live with? Spouse   If you need help, do you have trouble finding someone available to you? No   Have you been bothered in the last four weeks by sexual problems? Yes   Do you have difficulty concentrating, remembering or making decisions? No         Does the patient have evidence of cognitive impairment? No    Asprin use counseling:Taking ASA appropriately as indicated    Age-appropriate Screening Schedule:  Refer to the list below for future screening recommendations based on patient's age, sex and/or medical conditions. Orders for these recommended tests are listed in the plan section. The patient has been  provided with a written plan.    Health Maintenance   Topic Date Due   • ZOSTER VACCINE (2 of 3) 06/05/2015   • PNEUMOCOCCAL VACCINES (65+ LOW/MEDIUM RISK) (2 of 2 - PPSV23) 09/22/2016   • DIABETIC EYE EXAM  04/02/2019   • INFLUENZA VACCINE  08/01/2019   • HEMOGLOBIN A1C  10/10/2019   • DIABETIC FOOT EXAM  04/10/2020   • LIPID PANEL  04/10/2020   • URINE MICROALBUMIN  04/17/2020   • TDAP/TD VACCINES (2 - Td) 01/10/2021   • COLONOSCOPY  10/21/2025          The following portions of the patient's history were reviewed and updated as appropriate: allergies, current medications, past family history, past medical history, past social history, past surgical history and problem list.    Outpatient Medications Prior to Visit   Medication Sig Dispense Refill   • amitriptyline (ELAVIL) 10 MG tablet Take 1 tablet by mouth Daily.  0   • amoxicillin (AMOXIL) 500 MG capsule take 4 capsules by mouth PRIOR TO DENTAL PROCEDURE 4 capsule 1   • anastrozole (ARIMIDEX) 1 MG tablet Take 1 mg by mouth 2 (Two) Times a Week.     • aspirin 81 MG tablet Take 1 tablet by mouth Daily. 90 tablet 3   • B Complex Vitamins (VITAMIN B COMPLEX) capsule capsule Take 1 capsule by mouth Daily.     • Cholecalciferol (VITAMIN D3) 5000 units capsule capsule Take 5,000 Units by mouth Daily.     • Coenzyme Q10 (CO Q 10 PO) Take 1 capsule by mouth Daily.     • DHEA 25 MG capsule Take 25 mg by mouth Daily.     • Docusate Sodium (STOOL SOFTENER) 100 MG capsule Take 1 tablet by mouth daily.     • levETIRAcetam (KEPPRA) 500 MG tablet take 1/2 tablet by mouth once daily 45 tablet 3   • lisinopril (PRINIVIL,ZESTRIL) 10 MG tablet Take 1 tablet by mouth Daily. 90 tablet 3   • metFORMIN (GLUCOPHAGE) 500 MG tablet take 1 tablet by mouth once daily 90 tablet 3   • Multiple Vitamins-Minerals (CENTRUM ADULTS PO) Take 1 tablet by mouth daily.     • Omega-3 Fatty Acids (FISH OIL PO) Take 1 tablet by mouth Daily.     • pantoprazole (PROTONIX) 40 MG EC tablet take 1 tablet by  mouth once daily 90 tablet 3   • sildenafil (REVATIO) 20 MG tablet Take 20 mg by mouth As Needed.     • simvastatin (ZOCOR) 10 MG tablet Take 1 tablet by mouth Every Evening. 90 tablet 3   • TESTOSTERONE CYPIONATE IJ Inject  as directed 1 (One) Time Per Week.     • AMITRIPTYLINE HCL PO Take  by mouth Daily As Needed.     • RA ASPIRIN EC 81 MG EC tablet Take 1 tablet by mouth Daily.  0     No facility-administered medications prior to visit.        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 Have asked the patient to send this to us to add to record    Review of Systems   Constitutional: Negative for chills, fatigue and fever.   HENT: Negative for congestion, ear pain and sinus pressure.    Respiratory: Negative for cough, chest tightness, shortness of breath and wheezing.    Cardiovascular: Negative for chest pain and palpitations.   Gastrointestinal: Negative for abdominal pain, blood in stool and constipation.   Skin: Negative for color change.   Allergic/Immunologic: Negative for environmental allergies.   Neurological: Negative for dizziness, speech difficulty and headaches.   Psychiatric/Behavioral: Negative for confusion. The patient is not nervous/anxious.   "      Compared to one year ago, the patient feels his physical health is the same.  Compared to one year ago, the patient feels his mental health is the same.    Reviewed chart for potential of high risk medication in the elderly: yes  Reviewed chart for potential of harmful drug interactions in the elderly:yes    Objective         Vitals:    10/08/19 1055   BP: 130/86   BP Location: Left arm   Patient Position: Sitting   Cuff Size: Adult   Pulse: 64   Temp: 98.4 °F (36.9 °C)   TempSrc: Temporal   Weight: 76.7 kg (169 lb)   Height: 169 cm (66.54\")   PainSc: 0-No pain       Body mass index is 26.84 kg/m².  Discussed the patient's BMI with him. The BMI is in the acceptable range.    Physical Exam          Assessment/Plan   Medicare Risks and Personalized Health Plan  CMS Preventative Services Quick Reference  Immunizations Discussed/Encouraged (specific immunizations; Influenza and Shingrix )    The above risks/problems have been discussed with the patient.  Pertinent information has been shared with the patient in the After Visit Summary.  Follow up plans and orders are seen below in the Assessment/Plan Section.    Diagnoses and all orders for this visit:    1. Medicare annual wellness visit, subsequent (Primary)      Follow Up:  Return for Annual physical, Labs this visit, Next scheduled follow up.     An After Visit Summary and PPPS were given to the patient.             "

## 2019-10-10 LAB
TESTOST FREE SERPL-MCNC: 27.4 PG/ML (ref 6.6–18.1)
TESTOST SERPL-MCNC: 1280 NG/DL (ref 264–916)

## 2019-10-12 LAB
BIOAVAILABLE TESTOSTERONE, %: 54.8 %
BIOAVAILABLE TESTOSTERONE, S: 686 NG/DL
TESTOST SERPL-MCNC: 1252 NG/DL

## 2019-10-17 ENCOUNTER — OFFICE VISIT (OUTPATIENT)
Dept: INTERNAL MEDICINE | Facility: CLINIC | Age: 74
End: 2019-10-17

## 2019-10-17 VITALS
HEART RATE: 60 BPM | WEIGHT: 170.5 LBS | HEIGHT: 67 IN | BODY MASS INDEX: 26.76 KG/M2 | DIASTOLIC BLOOD PRESSURE: 82 MMHG | SYSTOLIC BLOOD PRESSURE: 122 MMHG

## 2019-10-17 DIAGNOSIS — N40.1 BPH WITH URINARY OBSTRUCTION: ICD-10-CM

## 2019-10-17 DIAGNOSIS — I10 ESSENTIAL HYPERTENSION: Primary | ICD-10-CM

## 2019-10-17 DIAGNOSIS — I25.10 CORONARY ARTERY DISEASE INVOLVING NATIVE CORONARY ARTERY OF NATIVE HEART WITHOUT ANGINA PECTORIS: ICD-10-CM

## 2019-10-17 DIAGNOSIS — E11.9 TYPE 2 DIABETES MELLITUS WITHOUT COMPLICATION, WITHOUT LONG-TERM CURRENT USE OF INSULIN (HCC): ICD-10-CM

## 2019-10-17 DIAGNOSIS — N13.8 BPH WITH URINARY OBSTRUCTION: ICD-10-CM

## 2019-10-17 DIAGNOSIS — Z71.9 HEALTH COUNSELING: ICD-10-CM

## 2019-10-17 DIAGNOSIS — E78.5 HYPERLIPIDEMIA LDL GOAL <70: ICD-10-CM

## 2019-10-17 DIAGNOSIS — Z95.3 STATUS POST AORTIC VALVE REPLACEMENT WITH BIOPROSTHETIC VALVE: ICD-10-CM

## 2019-10-17 DIAGNOSIS — G40.409 GRAND MAL SEIZURE (HCC): ICD-10-CM

## 2019-10-17 PROCEDURE — 99214 OFFICE O/P EST MOD 30 MIN: CPT | Performed by: INTERNAL MEDICINE

## 2019-10-17 PROCEDURE — 93000 ELECTROCARDIOGRAM COMPLETE: CPT | Performed by: INTERNAL MEDICINE

## 2019-10-17 NOTE — PATIENT INSTRUCTIONS
EKG discussed no change  Lab discussed copy given  Continue medical therapy with no change  Continue current exercise program and healthy cardiac diet  Return visit in 6 months or as needed

## 2019-10-17 NOTE — PROGRESS NOTES
[facility]     Christian Mcintosh  1945   [id number]      Patient Care Team:  Merrill De La Paz MD as PCP - General (Internal Medicine)  Merrill De La Paz MD as PCP - Claims Attributed  Marshal Spangler IV, MD as Cardiologist (Interventional Cardiology)    Chief Complaint::   Chief Complaint   Patient presents with   • Hypertension   • Diabetes   • Coronary Artery Disease            HPI  Patient is an 84-year-old male with a history of nonobstructive coronary disease and aortic valve requiring aortic valve replacement occurring in March 2013 overall stable on aspirin lisinopril simvastatin denying headache or dizzy chest pain or pressure palpitations syncope.  In addition the patient has a history of essential hypertension mixed hyperlipidemia BPH left shoulder rotator cuff-repaired grand mal seizure disorder with last seizure approximately 4 years ago and a history of an abnormal EKG with first-degree AV block and incomplete right bundle.  Overall patient feels well he has joined a health counseling group and is receiving testosterone DHEA and Arimidex he is doing physical exercise on a daily basis and overall feels well.      Patient Active Problem List   Diagnosis   • Coronary artery disease involving native coronary artery of native heart without angina pectoris   • Status post aortic valve replacement with bioprosthetic valve   • Hyperlipidemia LDL goal <70   • Typical atrial flutter (CMS/HCC)   • Cataracts, bilateral   • Tinnitus   • Essential hypertension   • Type 2 diabetes mellitus without complication, without long-term current use of insulin (CMS/HCC)   • Primary osteoarthritis of left shoulder   • Other male erectile dysfunction   • Pain, knee   • Grand mal seizure (CMS/HCC)   • Testicular hypofunction   • First degree AV block   • BPH with urinary obstruction   • Chronic GERD   • Primary osteoarthritis of both knees   • Health counseling        Past Medical History:   Diagnosis Date    • Aortic stenosis    • Atrial flutter (CMS/HCC)     RFA by Dr. Padilla   • Benign prostatic hypertrophy w elevated PSA 01/03/2018    BX NO CANCER   • Bicuspid aortic valve    • Cataract    • Coronary artery disease 03/06/2013   • Diverticulosis 2002   • Gastric ulcer 2002   • GERD (gastroesophageal reflux disease)    • XE-Wfavvrr-Tarue tear 07/23/2013   • H/O atrial flutter    • History of basal cell cancer 2007   • History of echocardiogram    • History of rotator cuff tear    • Hyperlipidemia    • Hypertension    • Osteoarthritis 2011   • PONV (postoperative nausea and vomiting)    • Seizure (CMS/HCC)    • Skin cancer     skin.  BASAL CELL SKIN CANCER LEFT CHEEK   • Status post shoulder surgery    • Tear of left rotator cuff    • Tinnitus    • Urinary retention     king       Past Surgical History:   Procedure Laterality Date   • BASAL CELL CARCINOMA EXCISION  2007   • BONE SPUR ARM     • CARDIAC ABLATION     • CARDIAC CATHETERIZATION  03/06/2013   • CARDIAC VALVE REPLACEMENT  03/26/2013    bioprosthetic aortic valve   • CARDIAC VALVE REPLACEMENT     • CARPAL TUNNEL RELEASE     • CATARACT EXTRACTION     • COLONOSCOPY     • COLONOSCOPY  08/29/2012   • CYST REMOVAL     • CYSTOSCOPY TRANSURETHRAL RESECTION OF PROSTATE N/A 1/3/2018    Procedure: PROSTATE ULTRASOUND AND BIOPSY, CYSTOSCOPY TRANSURETHRAL RESECTION OF PROSTATE GREENLIGHT WITH VAPORIZATION;  Surgeon: Silvio Ahn MD;  Location: [facility] [place];  Service:    • ENDOSCOPY  2002    w/biopsy   • EYE SURGERY      bilateral cataract extraction   • KING CATHETER  11/01/2016   • HERNIA REPAIR  2010   • KNEE ARTHROSCOPY     • KNEE MENISCAL REPAIR Bilateral    • KNEE SURGERY Left 2011   • LASIK     • PROSTATE BIOPSY  01/03/2018   • ROTATOR CUFF REPAIR  10/26/2016   • SHOULDER SURGERY Left    • SKIN CANCER EXCISION  12/07/2016    left cheek   • SKIN CANCER EXCISION     • TURP / TRANSURETHRAL INCISION / DRAINAGE PROSTATE     • VASECTOMY         Family History    Problem Relation Age of Onset   • No Known Problems Mother    • Heart attack Father 60   • Coronary artery disease Father    • Heart disease Brother    • Diabetes Brother    • Diabetes Sister    • Other Son         -accident       Social History     Socioeconomic History   • Marital status:      Spouse name: Not on file   • Number of children: Not on file   • Years of education: Not on file   • Highest education level: Not on file   Tobacco Use   • Smoking status: Former Smoker     Packs/day: 2.00     Years: 20.00     Pack years: 40.00     Types: Cigarettes     Last attempt to quit:      Years since quittin.8   • Smokeless tobacco: Never Used   Substance and Sexual Activity   • Alcohol use: Yes     Alcohol/week: 1.2 oz     Types: 2 Shots of liquor per week   • Drug use: No   • Sexual activity: Defer       Allergies   Allergen Reactions   • Shellfish-Derived Products Anaphylaxis       Review of Systems     HEENT: Denies headache or dizzy is remote bilateral cataract by Dr. Dietz denies hearing nose mouth or throat difficulty  NECK: Denies dysphasia or reflux  CHEST: Non-smoker for 32 years quit in  denies cough wheeze or shortness of breath  CARDIAC: Denies chest pain pressure tightness palpitations history of remote cardiac catheterization  showing nonobstructive coronary disease at which time he underwent elective aortic valve replacement currently stable without chest pain pressure or palpitations  ABD: Denies nausea vomiting abdominal pain  : Denies dysuria frequency  NEURO: Denies syncope, concussion or neuropathy remote history of grand mal seizure on Keppra 250 mg followed by neurology last seizure approximately 4 years ago  PSYCH: Denies anxiety depression  EXTREM: Complains of right shoulder discomfort remote left shoulder cuff repair approximately 2 years ago    Vital Signs  Vitals:    10/17/19 0949   BP: 122/82   BP Location: Left arm   Patient Position: Sitting   Pulse:  "60  Comment: irregular pattern   Weight: 77.3 kg (170 lb 8 oz)   Height: 169.5 cm (66.75\")   PainSc: 0-No pain     Body mass index is 26.9 kg/m².    Current Outpatient Medications:   •  amitriptyline (ELAVIL) 10 MG tablet, Take 1 tablet by mouth Daily., Disp: , Rfl: 0  •  amoxicillin (AMOXIL) 500 MG capsule, take 4 capsules by mouth PRIOR TO DENTAL PROCEDURE, Disp: 4 capsule, Rfl: 1  •  anastrozole (ARIMIDEX) 1 MG tablet, Take 1 mg by mouth 2 (Two) Times a Week., Disp: , Rfl:   •  aspirin 81 MG tablet, Take 1 tablet by mouth Daily., Disp: 90 tablet, Rfl: 3  •  B Complex Vitamins (VITAMIN B COMPLEX) capsule capsule, Take 1 capsule by mouth Daily., Disp: , Rfl:   •  Cholecalciferol (VITAMIN D3) 5000 units capsule capsule, Take 5,000 Units by mouth Daily., Disp: , Rfl:   •  Coenzyme Q10 (CO Q 10 PO), Take 1 capsule by mouth Daily., Disp: , Rfl:   •  DHEA 25 MG capsule, Take 25 mg by mouth Daily., Disp: , Rfl:   •  Docusate Sodium (STOOL SOFTENER) 100 MG capsule, Take 1 tablet by mouth daily., Disp: , Rfl:   •  levETIRAcetam (KEPPRA) 500 MG tablet, take 1/2 tablet by mouth once daily, Disp: 45 tablet, Rfl: 3  •  lisinopril (PRINIVIL,ZESTRIL) 10 MG tablet, Take 1 tablet by mouth Daily., Disp: 90 tablet, Rfl: 3  •  metFORMIN (GLUCOPHAGE) 500 MG tablet, take 1 tablet by mouth once daily, Disp: 90 tablet, Rfl: 3  •  Multiple Vitamins-Minerals (CENTRUM ADULTS PO), Take 1 tablet by mouth daily., Disp: , Rfl:   •  Omega-3 Fatty Acids (FISH OIL PO), Take 1 tablet by mouth Daily., Disp: , Rfl:   •  pantoprazole (PROTONIX) 40 MG EC tablet, take 1 tablet by mouth once daily, Disp: 90 tablet, Rfl: 3  •  sildenafil (REVATIO) 20 MG tablet, Take 20 mg by mouth As Needed., Disp: , Rfl:   •  simvastatin (ZOCOR) 10 MG tablet, Take 1 tablet by mouth Every Evening., Disp: 90 tablet, Rfl: 3  •  TESTOSTERONE CYPIONATE IJ, Inject  as directed 1 (One) Time Per Week., Disp: , Rfl:     Physical Exam     ACE III MINI         HEENT: Pupils equal " reactive ENT clear no asymmetry  NECK: No masses bruits thyromegaly or neck vein distention  CHEST: Clear  CARDIAC: Regular rhythm without gallop rub click a soft systolic murmur of the mitral valve  ABD: Liver and spleen are normal positive bowel sounds and no bruit  :   NEURO: CNS is intact no neuropathy  PSYCH: No anxiety depression  EXTREM: Mild reduced range of motion right shoulder  Skin: Clear     Results Review:    Recent Results (from the past 672 hour(s))   Estradiol    Collection Time: 10/08/19  8:59 AM   Result Value Ref Range    Estradiol 13.7 pg/mL   Testosterone, Free, Total    Collection Time: 10/08/19  8:59 AM   Result Value Ref Range    Testosterone, Total 1280 (H) 264 - 916 ng/dL    Testosterone, Free 27.4 (H) 6.6 - 18.1 pg/mL   Testosterone, Bioavailable (M)    Collection Time: 10/08/19  8:59 AM   Result Value Ref Range    Testosterone, Total 1252 (H) ng/dL    Testosterone, Bioavailable 686 (H) ng/dL    Testosterone, % Bioavailable 54.8 %   CBC Auto Differential    Collection Time: 10/08/19  8:59 AM   Result Value Ref Range    WBC 8.88 3.40 - 10.80 10*3/mm3    RBC 6.09 (H) 4.14 - 5.80 10*6/mm3    Hemoglobin 17.7 13.0 - 17.7 g/dL    Hematocrit 53.6 (H) 37.5 - 51.0 %    MCV 88.0 79.0 - 97.0 fL    MCH 29.1 26.6 - 33.0 pg    MCHC 33.0 31.5 - 35.7 g/dL    RDW 13.2 12.3 - 15.4 %    RDW-SD 41.3 37.0 - 54.0 fl    MPV 11.4 6.0 - 12.0 fL    Platelets 200 140 - 450 10*3/mm3    Neutrophil % 62.9 42.7 - 76.0 %    Lymphocyte % 22.5 19.6 - 45.3 %    Monocyte % 9.0 5.0 - 12.0 %    Eosinophil % 4.3 0.3 - 6.2 %    Basophil % 1.0 0.0 - 1.5 %    Immature Grans % 0.3 0.0 - 0.5 %    Neutrophils, Absolute 5.58 1.70 - 7.00 10*3/mm3    Lymphocytes, Absolute 2.00 0.70 - 3.10 10*3/mm3    Monocytes, Absolute 0.80 0.10 - 0.90 10*3/mm3    Eosinophils, Absolute 0.38 0.00 - 0.40 10*3/mm3    Basophils, Absolute 0.09 0.00 - 0.20 10*3/mm3    Immature Grans, Absolute 0.03 0.00 - 0.05 10*3/mm3    nRBC 0.0 0.0 - 0.2 /100 WBC    Comprehensive Metabolic Panel    Collection Time: 10/08/19  9:00 AM   Result Value Ref Range    Glucose 98 65 - 99 mg/dL    BUN 14 8 - 23 mg/dL    Creatinine 1.32 (H) 0.76 - 1.27 mg/dL    Sodium 139 136 - 145 mmol/L    Potassium 4.6 3.5 - 5.2 mmol/L    Chloride 99 98 - 107 mmol/L    CO2 28.1 22.0 - 29.0 mmol/L    Calcium 9.7 8.6 - 10.5 mg/dL    Total Protein 7.2 6.0 - 8.5 g/dL    Albumin 4.90 3.50 - 5.20 g/dL    ALT (SGPT) 24 1 - 41 U/L    AST (SGOT) 30 1 - 40 U/L    Alkaline Phosphatase 65 39 - 117 U/L    Total Bilirubin 0.7 0.2 - 1.2 mg/dL    eGFR Non African Amer 53 (L) >60 mL/min/1.73    Globulin 2.3 gm/dL    A/G Ratio 2.1 g/dL    BUN/Creatinine Ratio 10.6 7.0 - 25.0    Anion Gap 11.9 5.0 - 15.0 mmol/L   Lipid Panel    Collection Time: 10/08/19  9:00 AM   Result Value Ref Range    Total Cholesterol 140 0 - 200 mg/dL    Triglycerides 73 0 - 150 mg/dL    HDL Cholesterol 45 40 - 60 mg/dL    LDL Cholesterol  80 0 - 100 mg/dL    VLDL Cholesterol 14.6 5 - 40 mg/dL    LDL/HDL Ratio 1.79    TSH Rfx On Abnormal To Free T4    Collection Time: 10/08/19  9:00 AM   Result Value Ref Range    TSH 1.380 0.270 - 4.200 uIU/mL   Microalbumin / Creatinine Urine Ratio - Urine, Clean Catch    Collection Time: 10/08/19  9:00 AM   Result Value Ref Range    Microalbumin/Creatinine Ratio      Creatinine, Urine 99.6 mg/dL    Microalbumin, Urine <1.2 mg/dL   PSA Screen    Collection Time: 10/08/19  9:00 AM   Result Value Ref Range    PSA 2.030 0.000 - 4.000 ng/mL   Hemoglobin A1c    Collection Time: 10/08/19  9:00 AM   Result Value Ref Range    Hemoglobin A1C 6.10 (H) 4.80 - 5.60 %   CBC Auto Differential    Collection Time: 10/08/19  9:00 AM   Result Value Ref Range    WBC 8.78 3.40 - 10.80 10*3/mm3    RBC 6.11 (H) 4.14 - 5.80 10*6/mm3    Hemoglobin 17.8 (H) 13.0 - 17.7 g/dL    Hematocrit 54.3 (H) 37.5 - 51.0 %    MCV 88.9 79.0 - 97.0 fL    MCH 29.1 26.6 - 33.0 pg    MCHC 32.8 31.5 - 35.7 g/dL    RDW 12.7 12.3 - 15.4 %    RDW-SD 41.2  37.0 - 54.0 fl    MPV 11.2 6.0 - 12.0 fL    Platelets 191 140 - 450 10*3/mm3    Neutrophil % 63.5 42.7 - 76.0 %    Lymphocyte % 22.3 19.6 - 45.3 %    Monocyte % 8.4 5.0 - 12.0 %    Eosinophil % 4.3 0.3 - 6.2 %    Basophil % 1.0 0.0 - 1.5 %    Immature Grans % 0.5 0.0 - 0.5 %    Neutrophils, Absolute 5.57 1.70 - 7.00 10*3/mm3    Lymphocytes, Absolute 1.96 0.70 - 3.10 10*3/mm3    Monocytes, Absolute 0.74 0.10 - 0.90 10*3/mm3    Eosinophils, Absolute 0.38 0.00 - 0.40 10*3/mm3    Basophils, Absolute 0.09 0.00 - 0.20 10*3/mm3    Immature Grans, Absolute 0.04 0.00 - 0.05 10*3/mm3    nRBC 0.0 0.0 - 0.2 /100 WBC       ECG 12 Lead  Date/Time: 10/17/2019 4:15 PM  Performed by: Merrill De La Paz MD  Authorized by: Merrill De La Paz MD   Comparison: not compared with previous ECG   Rhythm: sinus rhythm and sinus arrhythmia  Rate: normal  Conduction: incomplete right bundle branch block and 1st degree AV block  QRS axis: left  Other findings: non-specific ST-T wave changes    Clinical impression: abnormal EKG  Comments: Sinus rhythm with sinus arrhythmia first-degree AV block with left axis and incomplete right bundle with nonspecific T wave changes no ischemia           EKG sinus rhythm sinus arrhythmia first-degree AV block left axis incomplete right bundle nonspecific T changes no ischemia  Patient Wellness Counseling:   Plan of care reviewed with patient at the conclusion of today's visit. Education was provided in regards to diagnosis, diet and exercise, prostate cancer screening discussed including benefit of early detection, potential need for follow-up, and harms associated with additional management. Patient agrees to screening.    Nutrition, physical activity, healthy weight,ways to reduce stress, adequate sleep, injury prevention, misuse of tobacco, alcohol and drugs, sexual behavior and STD's, dental health, mental health, and immunizations.    Plan of care reviewed with patient at the conclusion of today's visit.  Education was provided regarding diagnosis, management and any prescribed or recommended OTC medications.  Patient verbalizes understanding of and agreement with management plan.        Medication Review: Medications reviewed and noted    Patient wellness counseling  Exercise: Encouraged walking exercise and continued working out  Diet: Encouraged healthy cardiac and healthy diabetic diet  Smoking: Non-smoker for 32 years quit in 1986  Alcohol: Infrequent alcohol  Screening: Fasting lab discussed and copy given    Assessment/Plan:    Problem List Items Addressed This Visit        Cardiovascular and Mediastinum    Coronary artery disease involving native coronary artery of native heart without angina pectoris    Overview     · Cardiac catheterization (03/06/2013): mild non obstructive CAD         Current Assessment & Plan     Patient had cardiac catheterization on March 6, 2013 prior to AVR surgery patient had mild nonobstructive coronary disease asymptomatic on aspirin 81 mg and fish oil.  And simvastatin 10 mg daily  EKG shows sinus rhythm with sinus arrhythmia first-degree AV block of 0.220 incomplete right bundle and nonspecific T wave changes no ischemia         Relevant Medications    sildenafil (REVATIO) 20 MG tablet    Status post aortic valve replacement with bioprosthetic valve - Primary    Overview     · Echocardiogram (08/08/2012): Moderate AS, KLAUDIA of 0.8 cm2.  · Echocardiogram (02/26/2013): LVEF 55%, severe AS with mean gradient of 65 mmHg.   · Bioprosthetic aortic valve replacement by Dr. Stuart Griffin (03/26/2013): 25-mm Magna Ease with ascending aortoplasty. Left atrial appendage excluded  · Echo (6/15/18): LVEF 48%. Normal functioning bioprosthetic aortic valve. Trivial paravalvular leak.         Current Assessment & Plan     History of AVR March 26, 2013 currently asymptomatic on aspirin 81 echo Sandra 15, 2018 showing ejection fraction of 48%         Hyperlipidemia LDL goal <70    Overview      · Statin therapy indicated given presence of coronary artery disease         Current Assessment & Plan     History of mixed hyperlipidemia on simvastatin 10 mg daily fish oil daily with recent cholesterol of 140 and LDL of 80 no change therapy denies myalgia or arthralgia         Relevant Medications    simvastatin (ZOCOR) 10 MG tablet    Essential hypertension    Current Assessment & Plan     History of essential hypertension on lisinopril 10 mg daily with stable blood pressure 122/82 left and right sitting without headache or cough suggest no change in therapy.         Relevant Medications    lisinopril (PRINIVIL,ZESTRIL) 10 MG tablet       Endocrine    Type 2 diabetes mellitus without complication, without long-term current use of insulin (CMS/[facility])    Current Assessment & Plan     History of diabetes type 2 with recent A1c of 6.10 and a fasting blood sugar of 98 suggest no change in current therapy of metformin 500 every morning.         Relevant Medications    metFORMIN (GLUCOPHAGE) 500 MG tablet       Nervous and Auditory    Grand mal seizure (CMS/[facility])    Overview     Patient is currently on Keppra for seizure with medication having been reduced to 250 mg daily is followed by neurology last seizure was approximately 4 years ago.            Genitourinary    BPH with urinary obstruction    Overview     Previous laser TURP  by Dr. Ahn currently stable            Other    Health counseling    Overview     Patient is currently seeing health counseling physician Dr. Byrd who is prescribing testosterone, Arimidex, multivitamins, DHEA, patient feels well all current lab that was obtained for the annual wellness visit and physical was acceptable with elevated testosterone levels copy of labs given to patient for distribution to health physician.                Patient Instructions   EKG discussed no change  Lab discussed copy given  Continue medical therapy with no change  Continue current exercise program and  healthy cardiac diet  Return visit in 6 months or as needed       Plan of care reviewed with patient at the conclusion of today's visit. Education was provided regarding diagnosis, management, and any prescribed or recommended OTC medications.Patient verbalizes understanding of and agreement with management plan.         Merrill De La Paz MD      Note: Part of this note may be an electronic transcription/translation of spoken language to printed text using the Dragon Dictation system.

## 2019-11-19 ENCOUNTER — TELEPHONE (OUTPATIENT)
Dept: INTERNAL MEDICINE | Facility: CLINIC | Age: 74
End: 2019-11-19

## 2019-11-19 RX ORDER — DEXTROMETHORPHAN HYDROBROMIDE AND PROMETHAZINE HYDROCHLORIDE 15; 6.25 MG/5ML; MG/5ML
5 SYRUP ORAL 4 TIMES DAILY PRN
Qty: 120 ML | Refills: 0 | Status: SHIPPED | OUTPATIENT
Start: 2019-11-19 | End: 2020-04-21

## 2019-11-19 RX ORDER — AZITHROMYCIN 250 MG/1
TABLET, FILM COATED ORAL
Qty: 6 TABLET | Refills: 0 | Status: SHIPPED | OUTPATIENT
Start: 2019-11-19 | End: 2020-04-21

## 2019-11-19 NOTE — TELEPHONE ENCOUNTER
Patient c/o chest congestion with some cough, nonproductive. Some nasal drainage but no color.  Denies current sore throat. Afebrile.  Using OTC Dayquil/Nyquil.  Patient had #12 of Amoxil 500 mg he had on hand so took QID but no real improvement.  States he doesn't feel well. Symptoms x 4-5 days but not improving.

## 2019-11-19 NOTE — TELEPHONE ENCOUNTER
Patient reporting cold symptoms - approximately 3-4 days.  States that he had some leftover amoxicillin (3 days worth) from a dental appointment and he used that same medication.  Has had no improvement.    Requesting an RX be sent into the [facility] on [facility] to help with his symptoms.    Need to call pt? [phone] cell

## 2020-01-02 RX ORDER — ACETAMINOPHEN/DIPHENHYDRAMINE 500MG-25MG
TABLET ORAL
Qty: 90 TABLET | Refills: 3 | Status: SHIPPED | OUTPATIENT
Start: 2020-01-02 | End: 2021-04-23 | Stop reason: SDUPTHER

## 2020-01-16 DIAGNOSIS — E78.5 HYPERLIPIDEMIA LDL GOAL <70: ICD-10-CM

## 2020-01-16 RX ORDER — SIMVASTATIN 10 MG
10 TABLET ORAL EVERY EVENING
Qty: 90 TABLET | Refills: 3 | OUTPATIENT
Start: 2020-01-16

## 2020-02-10 RX ORDER — AMOXICILLIN 500 MG/1
CAPSULE ORAL
Qty: 4 CAPSULE | Refills: 1 | Status: SHIPPED | OUTPATIENT
Start: 2020-02-10 | End: 2020-04-21

## 2020-04-06 DIAGNOSIS — E78.5 HYPERLIPIDEMIA LDL GOAL <70: ICD-10-CM

## 2020-04-06 RX ORDER — SIMVASTATIN 10 MG
10 TABLET ORAL EVERY EVENING
Qty: 90 TABLET | Refills: 3 | Status: SHIPPED | OUTPATIENT
Start: 2020-04-06 | End: 2021-01-25

## 2020-04-21 ENCOUNTER — OFFICE VISIT (OUTPATIENT)
Dept: INTERNAL MEDICINE | Facility: CLINIC | Age: 75
End: 2020-04-21

## 2020-04-21 DIAGNOSIS — E11.9 TYPE 2 DIABETES MELLITUS WITHOUT COMPLICATION, WITHOUT LONG-TERM CURRENT USE OF INSULIN (HCC): ICD-10-CM

## 2020-04-21 DIAGNOSIS — M15.9 PRIMARY OSTEOARTHRITIS INVOLVING MULTIPLE JOINTS: Chronic | ICD-10-CM

## 2020-04-21 DIAGNOSIS — K21.9 CHRONIC GERD: ICD-10-CM

## 2020-04-21 DIAGNOSIS — I10 ESSENTIAL HYPERTENSION: ICD-10-CM

## 2020-04-21 DIAGNOSIS — Z95.3 STATUS POST AORTIC VALVE REPLACEMENT WITH BIOPROSTHETIC VALVE: Primary | ICD-10-CM

## 2020-04-21 DIAGNOSIS — E78.5 HYPERLIPIDEMIA LDL GOAL <70: ICD-10-CM

## 2020-04-21 PROCEDURE — 99442 PR PHYS/QHP TELEPHONE EVALUATION 11-20 MIN: CPT | Performed by: INTERNAL MEDICINE

## 2020-04-21 RX ORDER — PANTOPRAZOLE SODIUM 40 MG/1
40 TABLET, DELAYED RELEASE ORAL DAILY
Qty: 90 TABLET | Refills: 3 | Status: SHIPPED | OUTPATIENT
Start: 2020-04-21 | End: 2021-04-22

## 2020-04-21 RX ORDER — LISINOPRIL 10 MG/1
10 TABLET ORAL DAILY
Qty: 90 TABLET | Refills: 3 | Status: SHIPPED | OUTPATIENT
Start: 2020-04-21 | End: 2021-04-23

## 2020-04-21 NOTE — PROGRESS NOTES
[facility]     Christian Mcintosh  1945   [id number]      Patient Care Team:  Merrill De La Paz MD as PCP - General (Internal Medicine)  Taiwo Dietz MD as PCP - Claims Attributed  Marshal Spangler IV, MD as Cardiologist (Interventional Cardiology)    Chief Complaint::   Chief Complaint   Patient presents with   • Hypertension   • Shoulder Pain     right - pain and decreased ROM-  Saw Dr Peraza in the past   • Knee Pain     Bilateral-  has not been exercising over last month.    • Med Refill      You have chosen to receive care through a telephone visit. Do you consent to use a telephone visit for your medical care today? Yes    This visit has been rescheduled as a phone visit to comply with patient safety concerns in accordance with CDC recommendations. Total time of discussion was 18 minutes.      This is a telephone visit  HPI  Patient 74-year-old male complaining of bilateral shoulder discomfort right greater than left he is remote left shoulder repair by Synjardy is having pain and soreness and reduced range of motion in his right shoulder, complaining of pain soreness in both knees he is remote meniscus repair and having had Synvisc injection therapy he does have some soreness in the right knee.  In addition the patient needs refills on metformin Protonix and lisinopril the patient is overall feeling well except for the soreness and discomfort of the right shoulder and right knee.  Patient's weight is 164 his blood pressures been running 120/70 range he said no recent changes in his diet medication or activity other than he is not going to the gym and his testosterone shots have been discontinued because of closure of the facility.      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 Padilla   • Benign prostatic hypertrophy w elevated PSA 01/03/2018    BX NO CANCER   • Bicuspid aortic valve    • Cataract    • Coronary artery disease 03/06/2013   • Diverticulosis 2002   • Gastric ulcer 2002   • GERD (gastroesophageal reflux disease)    • HU-Stullmi-Rpudz tear 07/23/2013   • H/O atrial flutter    • History of basal cell cancer 2007   • History of echocardiogram    • History of rotator cuff tear    • Hyperlipidemia    • Hypertension    • Osteoarthritis 2011   • PONV (postoperative nausea and vomiting)    • Seizure (CMS/HCC)    • Skin cancer     skin.  BASAL CELL SKIN CANCER LEFT CHEEK   • Status post shoulder surgery    • Tear of left rotator cuff    • Tinnitus    • Urinary retention     king       Past Surgical History:   Procedure Laterality Date   • BASAL CELL CARCINOMA EXCISION  2007   • BONE SPUR ARM     • CARDIAC ABLATION     • CARDIAC CATHETERIZATION  03/06/2013   • CARDIAC VALVE REPLACEMENT  03/26/2013    bioprosthetic aortic valve   • CARDIAC VALVE REPLACEMENT     • CARPAL TUNNEL RELEASE     • CATARACT EXTRACTION     • COLONOSCOPY     • COLONOSCOPY  08/29/2012   • CYST REMOVAL     • CYSTOSCOPY TRANSURETHRAL RESECTION OF PROSTATE N/A 1/3/2018    Procedure: PROSTATE ULTRASOUND AND BIOPSY, CYSTOSCOPY TRANSURETHRAL RESECTION OF PROSTATE GREENLIGHT WITH VAPORIZATION;  Surgeon: Silvio HARRIS  MD Anabelle;  Location: [facility] OR;  Service:    • ENDOSCOPY      w/biopsy   • EYE SURGERY      bilateral cataract extraction   • MCQUEEN CATHETER  2016   • HERNIA REPAIR     • KNEE ARTHROSCOPY     • KNEE MENISCAL REPAIR Bilateral    • KNEE SURGERY Left    • LASIK     • PROSTATE BIOPSY  2018   • ROTATOR CUFF REPAIR  10/26/2016   • SHOULDER SURGERY Left    • SKIN CANCER EXCISION  2016    left cheek   • SKIN CANCER EXCISION     • TURP / TRANSURETHRAL INCISION / DRAINAGE PROSTATE     • VASECTOMY         Family History   Problem Relation Age of Onset   • No Known Problems Mother    • Heart attack Father 60   • Coronary artery disease Father    • Heart disease Brother    • Diabetes Brother    • Diabetes Sister    • Other Son         -accident       Social History     Socioeconomic History   • Marital status:      Spouse name: Not on file   • Number of children: Not on file   • Years of education: Not on file   • Highest education level: Not on file   Tobacco Use   • Smoking status: Former Smoker     Packs/day: 2.00     Years: 20.00     Pack years: 40.00     Types: Cigarettes     Last attempt to quit:      Years since quittin.3   • Smokeless tobacco: Never Used   Substance and Sexual Activity   • Alcohol use: Yes     Alcohol/week: 2.0 standard drinks     Types: 2 Shots of liquor per week   • Drug use: No   • Sexual activity: Defer       Allergies   Allergen Reactions   • Shellfish-Derived Products Anaphylaxis       Review of Systems     HEENT: Denies headache or dizzy has chronic allergy and nasal drainage  NECK: Denies pain stiffness swelling dysphasia reflux  CHEST: Non-smoker denies cough or wheeze  CARDIAC: Denies chest pain or pressure continues his lisinopril 10  ABD: Denies nausea vomiting abdominal pain no irritation from his metformin  : Denies dysuria frequency  NEURO: Denies syncope concussion or neuropathy   PSYCH: Denies anxiety depression  EXTREM: Complains of  right knee soreness and right shoulder soreness    Vital Signs  There were no vitals filed for this visit.  There is no height or weight on file to calculate BMI.  Patient's There is no height or weight on file to calculate BMI. BMI is within normal parameters. No follow-up required..     Advance Care Planning         Current Outpatient Medications:   •  amitriptyline (ELAVIL) 10 MG tablet, Take 1 tablet by mouth Daily., Disp: , Rfl: 0  •  anastrozole (ARIMIDEX) 1 MG tablet, Take 1 mg by mouth 2 (Two) Times a Week., Disp: , Rfl:   •  B Complex Vitamins (VITAMIN B COMPLEX) capsule capsule, Take 1 capsule by mouth Daily., Disp: , Rfl:   •  Cholecalciferol (VITAMIN D3) 5000 units capsule capsule, Take 5,000 Units by mouth Daily., Disp: , Rfl:   •  Coenzyme Q10 (CO Q 10 PO), Take 1 capsule by mouth Daily., Disp: , Rfl:   •  DHEA 25 MG capsule, Take 25 mg by mouth Daily., Disp: , Rfl:   •  Docusate Sodium (STOOL SOFTENER) 100 MG capsule, Take 1 tablet by mouth daily., Disp: , Rfl:   •  levETIRAcetam (KEPPRA) 500 MG tablet, take 1/2 tablet by mouth once daily, Disp: 45 tablet, Rfl: 3  •  lisinopril (PRINIVIL,ZESTRIL) 10 MG tablet, Take 1 tablet by mouth Daily., Disp: 90 tablet, Rfl: 3  •  metFORMIN (GLUCOPHAGE) 500 MG tablet, take 1 tablet by mouth once daily, Disp: 90 tablet, Rfl: 3  •  Multiple Vitamins-Minerals (CENTRUM ADULTS PO), Take 1 tablet by mouth daily., Disp: , Rfl:   •  Omega-3 Fatty Acids (FISH OIL PO), Take 1 tablet by mouth Daily., Disp: , Rfl:   •  pantoprazole (PROTONIX) 40 MG EC tablet, take 1 tablet by mouth once daily, Disp: 90 tablet, Rfl: 3  •  RA ASPIRIN EC 81 MG EC tablet, take 1 tablet by mouth once daily, Disp: 90 tablet, Rfl: 3  •  sildenafil (REVATIO) 20 MG tablet, Take 20 mg by mouth As Needed., Disp: , Rfl:   •  simvastatin (ZOCOR) 10 MG tablet, Take 1 tablet by mouth Every Evening., Disp: 90 tablet, Rfl: 3  •  TESTOSTERONE CYPIONATE IJ, Inject  as directed 1 (One) Time Per Week., Disp: ,  Rfl:     Physical Exam     ACE III MINI      Physical exam is not done is a telephone visit  HEENT: The patient denies facial asymmetry  NECK: The patient denies lumps or bumps in his neck  CHEST: The patient has no shortness of breath per telephone does not seem to be breathing hard he said no recent pulmonary infections  CARDIAC: Patient denies chest pain or palpitations he has checked his blood pressure and it is in the range of 120/70  ABD: Denies abdominal discomfort  : Deferred  NEURO:    PSYCH:    EXTREM: Patient states he has reduced range of motion and soreness of the right shoulder and right knee  Skin: Patient denies rashes     Results Review:    No results found for this or any previous visit (from the past 672 hour(s)).  Procedures    Medication Review: Medications reviewed and noted    Patient wellness counseling  Exercise: Continue physical activity of walking and general exercise as he is no longer going to the gym  Diet: Continue excellent cardiac healthy diet  Smoking: Non-smoker  Alcohol: Rare alcohol  Screening: Patient needs fasting lab and will place in orders his last lab was October 2019    Assessment/Plan:    Problem List Items Addressed This Visit        Cardiovascular and Mediastinum    Status post aortic valve replacement with bioprosthetic valve - Primary    Overview     · Echocardiogram (08/08/2012): Moderate AS, KLAUDIA of 0.8 cm2.  · Echocardiogram (02/26/2013): LVEF 55%, severe AS with mean gradient of 65 mmHg.   · Bioprosthetic aortic valve replacement by Dr. Stuart Griffin (03/26/2013): 25-mm Magna Ease with ascending aortoplasty. Left atrial appendage excluded  · Echo (6/15/18): LVEF 48%. Normal functioning bioprosthetic aortic valve. Trivial paravalvular leak.         Current Assessment & Plan     Remote aortic valve replacement 2013 currently stable without pain pressure or tightness next cardiology follow-up is July 8, 2020 the patient is on aspirin 81 daily continue therapy          Hyperlipidemia LDL goal <70    Overview     · Statin therapy indicated given presence of coronary artery disease         Current Assessment & Plan     History of mixed hyperlipidemia on simvastatin 10 mg daily denies myalgia arthralgia continue simvastatin 10 patient is to come to the office lab for a fasting CMP and lipid.         Relevant Medications    simvastatin (ZOCOR) 10 MG tablet    Other Relevant Orders    Lipid Panel    Essential hypertension    Overview     History of essential hypertension currently on lisinopril 10 mg daily denies headache or cough         Current Assessment & Plan     Hypertension well-controlled on lisinopril 10 with current blood pressure 120/70 continue current therapy         Relevant Medications    lisinopril (PRINIVIL,ZESTRIL) 10 MG tablet    Other Relevant Orders    CBC & Differential    Comprehensive Metabolic Panel       Digestive    Chronic GERD    Overview     History of chronic GERD and gastritis on Protonix 40 mg daily         Current Assessment & Plan     Chronic GERD and gastritis improved on Protonix continue therapy         Relevant Medications    pantoprazole (PROTONIX) 40 MG EC tablet       Endocrine    Type 2 diabetes mellitus without complication, without long-term current use of insulin (CMS/[facility])    Overview     History of diabetes type 2 on metformin 500 mg daily with last blood work in acceptable fasting blood sugar of 98 and A1c in October 2019         Current Assessment & Plan     Diabetes type 2 on metformin 500 mg daily denies nausea vomiting or diarrhea patient is to come in for A1c and CMP         Relevant Medications    metFORMIN (GLUCOPHAGE) 500 MG tablet    Other Relevant Orders    Hemoglobin A1c    Microalbumin / Creatinine Urine Ratio - Urine, Clean Catch       Musculoskeletal and Integument    Osteoarthritis (Chronic)    Overview     History of bilateral knee arthritis and bilateral shoulder arthritis status post repair meniscus left and right  knee, and injection of Synvisc right knee last injection was approximately 1 year ago with a history of left shoulder replacement and current right shoulder discomfort pain the patient is on PRN ibuprofen         Current Assessment & Plan     Complaining of pain soreness reduced range of motion and loss of strength right shoulder and pain discomfort right knee his last injections of Synvisc in the right knee was approximately 1 year ago suggest patient continue with ibuprofen on a as needed basis but with suggest scheduling ibuprofen 200 twice daily with meals and then take extra ibuprofen for exacerbation of pain discomfort patient will ultimately be seen by Dr. Velasco for right shoulder reevaluation and potential surgery.                Patient Instructions   Continue excellent diet and exercise program  Refilled metformin lisinopril and Protonix-done  Return to lab for a fasting CBC CMP lipid A1c and urine microalbumin  Follow-up annual physical and annual wellness visit 6 months  Continue all current medications except change ibuprofen to 200 twice daily scheduled for shoulder and knee       Plan of care reviewed with patient at the conclusion of today's visit. Education was provided regarding diagnosis, management, and any prescribed or recommended OTC medications.Patient verbalizes understanding of and agreement with management plan.         Merrill De La Paz MD      Note: Part of this note may be an electronic transcription/translation of spoken language to printed text using the Dragon Dictation system.

## 2020-04-21 NOTE — ASSESSMENT & PLAN NOTE
Remote aortic valve replacement 2013 currently stable without pain pressure or tightness next cardiology follow-up is July 8, 2020 the patient is on aspirin 81 daily continue therapy

## 2020-04-21 NOTE — ASSESSMENT & PLAN NOTE
Complaining of pain soreness reduced range of motion and loss of strength right shoulder and pain discomfort right knee his last injections of Synvisc in the right knee was approximately 1 year ago suggest patient continue with ibuprofen on a as needed basis but with suggest scheduling ibuprofen 200 twice daily with meals and then take extra ibuprofen for exacerbation of pain discomfort patient will ultimately be seen by Dr. Velasco for right shoulder reevaluation and potential surgery.

## 2020-04-21 NOTE — ASSESSMENT & PLAN NOTE
History of mixed hyperlipidemia on simvastatin 10 mg daily denies myalgia arthralgia continue simvastatin 10 patient is to come to the office lab for a fasting CMP and lipid.

## 2020-04-21 NOTE — PATIENT INSTRUCTIONS
Continue excellent diet and exercise program  Refilled metformin lisinopril and Protonix-done  Return to lab for a fasting CBC CMP lipid A1c and urine microalbumin  Follow-up annual physical and annual wellness visit 6 months  Continue all current medications except change ibuprofen to 200 twice daily scheduled for shoulder and knee

## 2020-04-21 NOTE — ASSESSMENT & PLAN NOTE
Hypertension well-controlled on lisinopril 10 with current blood pressure 120/70 continue current therapy

## 2020-04-23 ENCOUNTER — LAB REQUISITION (OUTPATIENT)
Dept: LAB | Facility: HOSPITAL | Age: 75
End: 2020-04-23

## 2020-04-23 ENCOUNTER — LAB (OUTPATIENT)
Dept: LAB | Facility: HOSPITAL | Age: 75
End: 2020-04-23

## 2020-04-23 DIAGNOSIS — I10 ESSENTIAL HYPERTENSION: ICD-10-CM

## 2020-04-23 DIAGNOSIS — E78.5 HYPERLIPIDEMIA LDL GOAL <70: ICD-10-CM

## 2020-04-23 DIAGNOSIS — E11.9 TYPE 2 DIABETES MELLITUS WITHOUT COMPLICATION, WITHOUT LONG-TERM CURRENT USE OF INSULIN (HCC): ICD-10-CM

## 2020-04-23 DIAGNOSIS — Z00.00 ROUTINE GENERAL MEDICAL EXAMINATION AT A HEALTH CARE FACILITY: ICD-10-CM

## 2020-04-23 PROCEDURE — 36415 COLL VENOUS BLD VENIPUNCTURE: CPT | Performed by: INTERNAL MEDICINE

## 2020-04-24 ENCOUNTER — PATIENT MESSAGE (OUTPATIENT)
Dept: INTERNAL MEDICINE | Facility: CLINIC | Age: 75
End: 2020-04-24

## 2020-04-24 LAB
ALBUMIN SERPL-MCNC: 4.4 G/DL (ref 3.5–5.2)
ALBUMIN/CREAT UR: 7 MG/G CREAT (ref 0–29)
ALBUMIN/GLOB SERPL: 1.9 G/DL
ALP SERPL-CCNC: 77 U/L (ref 39–117)
ALT SERPL-CCNC: 35 U/L (ref 1–41)
AST SERPL-CCNC: 33 U/L (ref 1–40)
BASOPHILS # BLD AUTO: 0.08 10*3/MM3 (ref 0–0.2)
BASOPHILS NFR BLD AUTO: 1.3 % (ref 0–1.5)
BILIRUB SERPL-MCNC: 0.5 MG/DL (ref 0.2–1.2)
BUN SERPL-MCNC: 23 MG/DL (ref 8–23)
BUN/CREAT SERPL: 22.1 (ref 7–25)
CALCIUM SERPL-MCNC: 9.7 MG/DL (ref 8.6–10.5)
CHLORIDE SERPL-SCNC: 101 MMOL/L (ref 98–107)
CHOLEST SERPL-MCNC: 152 MG/DL (ref 0–200)
CO2 SERPL-SCNC: 30.3 MMOL/L (ref 22–29)
CREAT SERPL-MCNC: 1.04 MG/DL (ref 0.76–1.27)
CREAT UR-MCNC: 165 MG/DL
EOSINOPHIL # BLD AUTO: 0.44 10*3/MM3 (ref 0–0.4)
EOSINOPHIL NFR BLD AUTO: 7.2 % (ref 0.3–6.2)
ERYTHROCYTE [DISTWIDTH] IN BLOOD BY AUTOMATED COUNT: 12.1 % (ref 12.3–15.4)
GLOBULIN SER CALC-MCNC: 2.3 GM/DL
GLUCOSE SERPL-MCNC: 116 MG/DL (ref 65–99)
HBA1C MFR BLD: 6.44 % (ref 4.8–5.6)
HCT VFR BLD AUTO: 50.8 % (ref 37.5–51)
HDLC SERPL-MCNC: 48 MG/DL (ref 40–60)
HGB BLD-MCNC: 17 G/DL (ref 13–17.7)
IMM GRANULOCYTES # BLD AUTO: 0.02 10*3/MM3 (ref 0–0.05)
IMM GRANULOCYTES NFR BLD AUTO: 0.3 % (ref 0–0.5)
LDLC SERPL CALC-MCNC: 86 MG/DL (ref 0–100)
LYMPHOCYTES # BLD AUTO: 2.15 10*3/MM3 (ref 0.7–3.1)
LYMPHOCYTES NFR BLD AUTO: 35.1 % (ref 19.6–45.3)
MCH RBC QN AUTO: 29.7 PG (ref 26.6–33)
MCHC RBC AUTO-ENTMCNC: 33.5 G/DL (ref 31.5–35.7)
MCV RBC AUTO: 88.8 FL (ref 79–97)
MICROALBUMIN UR-MCNC: 11.1 UG/ML
MONOCYTES # BLD AUTO: 0.52 10*3/MM3 (ref 0.1–0.9)
MONOCYTES NFR BLD AUTO: 8.5 % (ref 5–12)
NEUTROPHILS # BLD AUTO: 2.92 10*3/MM3 (ref 1.7–7)
NEUTROPHILS NFR BLD AUTO: 47.6 % (ref 42.7–76)
NRBC BLD AUTO-RTO: 0 /100 WBC (ref 0–0.2)
PLATELET # BLD AUTO: 181 10*3/MM3 (ref 140–450)
POTASSIUM SERPL-SCNC: 4.6 MMOL/L (ref 3.5–5.2)
PROT SERPL-MCNC: 6.7 G/DL (ref 6–8.5)
RBC # BLD AUTO: 5.72 10*6/MM3 (ref 4.14–5.8)
SODIUM SERPL-SCNC: 139 MMOL/L (ref 136–145)
TRIGL SERPL-MCNC: 91 MG/DL (ref 0–150)
VLDLC SERPL CALC-MCNC: 18.2 MG/DL (ref 5–40)
WBC # BLD AUTO: 6.13 10*3/MM3 (ref 3.4–10.8)

## 2020-05-26 RX ORDER — LEVETIRACETAM 500 MG/1
TABLET ORAL
Qty: 45 TABLET | Refills: 3 | Status: SHIPPED | OUTPATIENT
Start: 2020-05-26 | End: 2021-04-23

## 2020-07-16 NOTE — PROGRESS NOTES
[facility] at [facility]  Office Visit Note    DATE: 07/17/2020    IDENTIFICATION: Christian Mcintosh is a 75 y.o. male who resides in [place], [place]    REASON FOR VISIT:  • Coronary artery disease  • Typical atrial flutter  • Cardiac risk factors  • Valvular Heart Disease              Christian Mcintosh had video visit today for follow-up of his coronary artery disease,aortic valve replacement, typical atrial flutter, hypertension and hyperlipidemia.  Since his last visit over a year ago he has done well without any hospitalizations or new medical diagnoses.  The patient has recently started going back to the gym.  He is active without any physical limitations.  He has a bioprosthetic aortic valve and last echocardiogram in 2018 showed normal functioning with a trivial perivalvular leak.  He takes daily aspirin without any issues.  He has a history of atrial flutter that occurred after his valve replacement.  He ended up undergoing an ablation at that time.  He has had no further recurrences.  Initially he was anticoagulated with Coumadin but suffered a GI bleed and Tarsha-Villeda tear so it is been deferred ever since.  He was unable to have his blood pressure check today.  He does report having to follow-up with his PCP in a few weeks for blood pressure check.    Review of Systems   Constitution: Negative for malaise/fatigue.   Eyes: Negative for vision loss in left eye and vision loss in right eye.   Cardiovascular: Negative for chest pain, dyspnea on exertion, near-syncope, orthopnea, palpitations, paroxysmal nocturnal dyspnea and syncope.   Musculoskeletal: Negative for myalgias.   Neurological: Negative for brief paralysis, excessive daytime sleepiness, focal weakness, numbness, paresthesias and weakness.   All other systems reviewed and are negative.      The patient's past medical, social, family history and ROS reviewed in the patient's electronic medical record.    Allergies   Allergen  Reactions   • Shellfish-Derived Products Anaphylaxis         Current Outpatient Medications:   •  amitriptyline (ELAVIL) 10 MG tablet, Take 1 tablet by mouth Daily., Disp: , Rfl: 0  •  anastrozole (ARIMIDEX) 1 MG tablet, Take 1 mg by mouth 2 (Two) Times a Week., Disp: , Rfl:   •  aspirin 81 MG chewable tablet, Chew 81 mg Daily., Disp: , Rfl:   •  B Complex Vitamins (VITAMIN B COMPLEX) capsule capsule, Take 1 capsule by mouth Daily., Disp: , Rfl:   •  Cholecalciferol (VITAMIN D3) 5000 units capsule capsule, Take 5,000 Units by mouth Daily., Disp: , Rfl:   •  Coenzyme Q10 (CO Q 10 PO), Take 1 capsule by mouth Daily., Disp: , Rfl:   •  DHEA 25 MG capsule, Take 25 mg by mouth Daily., Disp: , Rfl:   •  Docusate Sodium (STOOL SOFTENER) 100 MG capsule, Take 1 tablet by mouth daily., Disp: , Rfl:   •  levETIRAcetam (KEPPRA) 500 MG tablet, TAKE 1/2 TABLET BY MOUTH ONCE DAILY, Disp: 45 tablet, Rfl: 3  •  lisinopril (PRINIVIL,ZESTRIL) 10 MG tablet, Take 1 tablet by mouth Daily., Disp: 90 tablet, Rfl: 3  •  metFORMIN (GLUCOPHAGE) 500 MG tablet, Take 1 tablet by mouth Daily., Disp: 90 tablet, Rfl: 3  •  Multiple Vitamins-Minerals (CENTRUM ADULTS PO), Take 1 tablet by mouth daily., Disp: , Rfl:   •  Omega-3 Fatty Acids (FISH OIL PO), Take 1 tablet by mouth Daily., Disp: , Rfl:   •  pantoprazole (PROTONIX) 40 MG EC tablet, Take 1 tablet by mouth Daily., Disp: 90 tablet, Rfl: 3  •  RA ASPIRIN EC 81 MG EC tablet, take 1 tablet by mouth once daily, Disp: 90 tablet, Rfl: 3  •  sildenafil (REVATIO) 20 MG tablet, Take 20 mg by mouth As Needed., Disp: , Rfl:   •  simvastatin (ZOCOR) 10 MG tablet, Take 1 tablet by mouth Every Evening., Disp: 90 tablet, Rfl: 3  •  TESTOSTERONE CYPIONATE IJ, Inject  as directed 1 (One) Time Per Week., Disp: , Rfl:     Past Medical History:   Diagnosis Date   • Aortic stenosis    • Atrial flutter (CMS/HCC)     RFA by Dr. Padilla   • Benign prostatic hypertrophy w elevated PSA 01/03/2018    BX NO CANCER   •  Bicuspid aortic valve    • Cataract    • Coronary artery disease 03/06/2013   • Diverticulosis 2002   • Gastric ulcer 2002   • GERD (gastroesophageal reflux disease)    • MD-Eykhgrn-Fdmyx tear 07/23/2013   • H/O atrial flutter    • History of basal cell cancer 2007   • History of echocardiogram    • History of rotator cuff tear    • Hyperlipidemia    • Hypertension    • Osteoarthritis 2011   • PONV (postoperative nausea and vomiting)    • Seizure (CMS/HCC)    • Skin cancer     skin.  BASAL CELL SKIN CANCER LEFT CHEEK   • Status post shoulder surgery    • Tear of left rotator cuff    • Tinnitus    • Urinary retention     king       Past Surgical History:   Procedure Laterality Date   • BASAL CELL CARCINOMA EXCISION  2007   • BONE SPUR ARM     • CARDIAC ABLATION     • CARDIAC CATHETERIZATION  03/06/2013   • CARDIAC VALVE REPLACEMENT  03/26/2013    bioprosthetic aortic valve   • CARDIAC VALVE REPLACEMENT     • CARPAL TUNNEL RELEASE     • CATARACT EXTRACTION     • COLONOSCOPY     • COLONOSCOPY  08/29/2012   • CYST REMOVAL     • CYSTOSCOPY TRANSURETHRAL RESECTION OF PROSTATE N/A 1/3/2018    Procedure: PROSTATE ULTRASOUND AND BIOPSY, CYSTOSCOPY TRANSURETHRAL RESECTION OF PROSTATE GREENLIGHT WITH VAPORIZATION;  Surgeon: Silvio Ahn MD;  Location: [facility];  Service:    • ENDOSCOPY  2002    w/biopsy   • EYE SURGERY      bilateral cataract extraction   • KING CATHETER  11/01/2016   • HERNIA REPAIR  2010   • KNEE ARTHROSCOPY     • KNEE MENISCAL REPAIR Bilateral    • KNEE SURGERY Left 2011   • LASIK     • PROSTATE BIOPSY  01/03/2018   • ROTATOR CUFF REPAIR  10/26/2016   • SHOULDER SURGERY Left    • SKIN CANCER EXCISION  12/07/2016    left cheek   • SKIN CANCER EXCISION     • TURP / TRANSURETHRAL INCISION / DRAINAGE PROSTATE     • VASECTOMY         Family History   Problem Relation Age of Onset   • No Known Problems Mother    • Heart attack Father 60   • Coronary artery disease Father    • Heart disease Brother    •  "Diabetes Brother    • Diabetes Sister    • Other Son         -accident       Social History     Tobacco Use   • Smoking status: Former Smoker     Packs/day: 2.00     Years: 20.00     Pack years: 40.00     Types: Cigarettes     Last attempt to quit:      Years since quittin.5   • Smokeless tobacco: Never Used   Substance Use Topics   • Alcohol use: Yes     Alcohol/week: 2.0 standard drinks     Types: 2 Shots of liquor per week     Comment: occasional            Height 167.6 cm (66\"), weight 74.8 kg (165 lb).  Body mass index is 26.63 kg/m².  There were no vitals filed for this visit.    Physical Exam   Constitutional: He is oriented to person, place, and time.   Neurological: He is alert and oriented to person, place, and time.   Psychiatric: He has a normal mood and affect. His behavior is normal. Judgment and thought content normal.       Data Review (reviewed with patient):     Procedures    Lab Results   Component Value Date    GLUCOSE 98 10/08/2019    BUN 23 2020    CREATININE 1.04 2020    EGFRIFNONA 70 2020    EGFRIFAFRI 85 2020    BCR 22.1 2020    K 4.6 2020    CO2 30.3 (H) 2020    CALCIUM 9.7 2020    ALBUMIN 4.40 2020    ALKPHOS 77 2020    AST 33 2020    ALT 35 2020      Lab Results   Component Value Date    CHOL 140 10/08/2019    CHLPL 152 2020    TRIG 91 2020    HDL 48 2020    LDL 86 2020     Lab Results   Component Value Date    HGBA1C 6.44 (H) 2020     Lab Results   Component Value Date    WBC 6.13 2020    HGB 17.0 2020    HCT 50.8 2020    MCV 88.8 2020     2020     Lab Results   Component Value Date    TSH 1.380 10/08/2019            Problem List Items Addressed This Visit        Cardiovascular and Mediastinum    Coronary artery disease involving native coronary artery of native heart without angina pectoris    Overview     · Cardiac catheterization " (03/06/2013): mild non obstructive CAD         Current Assessment & Plan     · Continue aspirin 81 mg daily  · Continue statin therapy  · No signs or symptoms of angina         Essential hypertension    Overview     History of essential hypertension currently on lisinopril 10 mg daily denies headache or cough         Current Assessment & Plan     · Patient unable to check blood pressure today         First degree AV block    Hyperlipidemia LDL goal <70    Overview     · Statin therapy indicated given presence of coronary artery disease         Current Assessment & Plan     · Continue Zocor 10 mg daily  · Last LDL at goal         Typical atrial flutter (CMS/HCC) - Primary    Overview     · Diagnosed postoperatively, asymptomatic.  · Typical flutter pattern on ECG, 05/03/2013. Initiated on Coumadin.  · Left atrial appendage excluded with AVR using Tiger Paw occluder.  · Radiofrequency ablation by Dr. Brooks Padilla, July 2013.   · Coumadin initiated following ablation but stopped after development of GI bleeding and a Tarsha-Villeda tear.          Current Assessment & Plan     · No further recurrences of atrial flutter following ablation in 2013  · Defer anticoagulation due to no recurrence and history of GI bleed and Tarsha-Villeda tear            Other    Status post aortic valve replacement with bioprosthetic valve    Overview     · Echocardiogram (08/08/2012): Moderate AS, KLAUDIA of 0.8 cm2.  · Echocardiogram (02/26/2013): LVEF 55%, severe AS with mean gradient of 65 mmHg.   · Bioprosthetic aortic valve replacement by Dr. Stuart Griffin (03/26/2013): 25-mm Magna Ease with ascending aortoplasty. Left atrial appendage excluded  · Echo (6/15/18): LVEF 48%. Normal functioning bioprosthetic aortic valve. Trivial paravalvular leak.         Current Assessment & Plan     · No signs or symptoms of CHF  · Repeat echocardiogram for surveillance of bioprosthetic aortic valve in 9 months on same day as follow-up  · Continue  aspirin 81 mg daily             Patient is doing well from a cardiovascular standpoint.  He has no signs or symptoms of angina, heart failure, TIA or CVA.  We will continue his current medications.  He will follow-up 9 months at [facility] with repeat echocardiogram on same day.       · Continue current medications  · Echocardiogram for surveillance of bioprosthetic aortic valve on same day as follow-up in 9 months  · Return in about 9 months (around 4/17/2021), or if symptoms worsen or fail to improve, for Follow-up with Dr. Spangler next visit.       lAba Ennis APRN, CCRN    This patient has consented to a telehealth visit via phone.  The visit was scheduled as a phone visit to comply with patient safety concerns in accordance with CDC recommendations.  All vitals recorded within this visit are reported by the patient.  I spent  30 minutes in total including but not limited to the 25 minutes spent in direct conversation with this patient.     7/17/2020

## 2020-07-17 ENCOUNTER — TELEMEDICINE (OUTPATIENT)
Dept: CARDIOLOGY | Facility: CLINIC | Age: 75
End: 2020-07-17

## 2020-07-17 VITALS — WEIGHT: 165 LBS | BODY MASS INDEX: 26.52 KG/M2 | HEIGHT: 66 IN

## 2020-07-17 DIAGNOSIS — I48.3 TYPICAL ATRIAL FLUTTER (HCC): ICD-10-CM

## 2020-07-17 DIAGNOSIS — I25.10 CORONARY ARTERY DISEASE INVOLVING NATIVE CORONARY ARTERY OF NATIVE HEART WITHOUT ANGINA PECTORIS: ICD-10-CM

## 2020-07-17 DIAGNOSIS — I44.0 FIRST DEGREE AV BLOCK: ICD-10-CM

## 2020-07-17 DIAGNOSIS — Z95.3 STATUS POST AORTIC VALVE REPLACEMENT WITH BIOPROSTHETIC VALVE: Primary | ICD-10-CM

## 2020-07-17 DIAGNOSIS — E78.5 HYPERLIPIDEMIA LDL GOAL <70: ICD-10-CM

## 2020-07-17 DIAGNOSIS — I10 ESSENTIAL HYPERTENSION: ICD-10-CM

## 2020-07-17 PROCEDURE — 99213 OFFICE O/P EST LOW 20 MIN: CPT | Performed by: NURSE PRACTITIONER

## 2020-07-17 RX ORDER — ASPIRIN 81 MG/1
81 TABLET, CHEWABLE ORAL DAILY
COMMUNITY
End: 2020-10-30 | Stop reason: SDUPTHER

## 2020-07-17 NOTE — ASSESSMENT & PLAN NOTE
· No further recurrences of atrial flutter following ablation in 2013  · Defer anticoagulation due to no recurrence and history of GI bleed and Tarsha-Villeda tear

## 2020-08-27 ENCOUNTER — TELEPHONE (OUTPATIENT)
Dept: INTERNAL MEDICINE | Facility: CLINIC | Age: 75
End: 2020-08-27

## 2020-08-27 RX ORDER — CYCLOBENZAPRINE HCL 5 MG
TABLET ORAL
Qty: 15 TABLET | Refills: 1 | Status: SHIPPED | OUTPATIENT
Start: 2020-08-27 | End: 2022-05-10

## 2020-08-27 NOTE — TELEPHONE ENCOUNTER
message noted, suggest Flexeril a muscle relaxer, 5 mg half tablet twice daily, and would suggest obtaining a COVID test with suggest obtaining at [facility] in [place], they are the most reliable and can get a rapid test.  Please inform patient I will send the muscle relaxer cyclobenzaprine to the pharmacy.

## 2020-08-27 NOTE — TELEPHONE ENCOUNTER
Caller: Christian Mcintosh    Relationship to patient: Self    Best call back [phone]    Concerns or Questions if Applicable: PT CALLED IN STATED HE WOULD LIKE A CB FROM THE PCP OR ONE OF THE NURSES TO DISCUSS A COUPLE OF MEDICAL CONCERNS PT STATED HE WOKE UP AND COULD HARDLY GET OUT OF BED AND HE WOULD LIKE TO KNOW IF HE SHOULD HAVE THE COVID TESTING COMPLETED AND WHERE CAN HE GO PLEASE ADVISE      Travel screen questions:

## 2020-08-27 NOTE — TELEPHONE ENCOUNTER
Pt states he has been having R shoulder problems for some time and is eventually going to need surgery, 1) but had episode Tues am, woke up approx 5 am and could not get out of bed.  States having pain entire right side down to knee. When finally got OOB, and walked around some, got better. 2)  Now soreness in back, but  denies actual body aches, states if he doesn't move, it does not ache.   3) Soreness on both sides of tongue. Bit tongue years ago during seizure, but this symptom is new.   He denies any loss of taste or smell, SOB, chest discomfort, cough, fever or chills.  States takes temp every day and no fever.  He did take a baclofan that his wife had, but it made him too drowsy. Takes ibuprofen bid and occas Tylenol at bedtime and does help some.  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  He has not been in contact with anyone he knows with covid, although he has been going to [employer], but states everyone does wipe their machines down.    Just not sure what to do?

## 2020-09-03 ENCOUNTER — PRIOR AUTHORIZATION (OUTPATIENT)
Dept: INTERNAL MEDICINE | Facility: CLINIC | Age: 75
End: 2020-09-03

## 2020-09-03 NOTE — TELEPHONE ENCOUNTER
Fax PA request for Flexeril.  GoodRX price is less than $4.  Pharmacy notified via voice mail message to use GoodRx coupon for patient

## 2020-10-27 ENCOUNTER — TELEPHONE (OUTPATIENT)
Dept: INTERNAL MEDICINE | Facility: CLINIC | Age: 75
End: 2020-10-27

## 2020-10-27 DIAGNOSIS — E11.9 TYPE 2 DIABETES MELLITUS WITHOUT COMPLICATION, WITHOUT LONG-TERM CURRENT USE OF INSULIN (HCC): ICD-10-CM

## 2020-10-27 DIAGNOSIS — E78.5 HYPERLIPIDEMIA LDL GOAL <70: ICD-10-CM

## 2020-10-27 DIAGNOSIS — Z12.5 PROSTATE CANCER SCREENING: ICD-10-CM

## 2020-10-27 DIAGNOSIS — I10 ESSENTIAL HYPERTENSION: Primary | ICD-10-CM

## 2020-10-27 NOTE — TELEPHONE ENCOUNTER
PATIENT REQUESTING FUTURE LAB ORDERS FOR HIS UPCOMING FASTING APPOINTMENT ON Friday 10/30/20    CALL BACK NUMBER [phone]

## 2020-10-30 ENCOUNTER — LAB (OUTPATIENT)
Dept: LAB | Facility: HOSPITAL | Age: 75
End: 2020-10-30

## 2020-10-30 ENCOUNTER — LAB REQUISITION (OUTPATIENT)
Dept: LAB | Facility: HOSPITAL | Age: 75
End: 2020-10-30

## 2020-10-30 ENCOUNTER — OFFICE VISIT (OUTPATIENT)
Dept: INTERNAL MEDICINE | Facility: CLINIC | Age: 75
End: 2020-10-30

## 2020-10-30 ENCOUNTER — TRANSCRIBE ORDERS (OUTPATIENT)
Dept: LAB | Facility: HOSPITAL | Age: 75
End: 2020-10-30

## 2020-10-30 VITALS
TEMPERATURE: 96.8 F | BODY MASS INDEX: 27.32 KG/M2 | HEIGHT: 66 IN | SYSTOLIC BLOOD PRESSURE: 98 MMHG | HEART RATE: 76 BPM | DIASTOLIC BLOOD PRESSURE: 74 MMHG | WEIGHT: 170 LBS

## 2020-10-30 DIAGNOSIS — Z12.5 PROSTATE CANCER SCREENING: ICD-10-CM

## 2020-10-30 DIAGNOSIS — E11.9 TYPE 2 DIABETES MELLITUS WITHOUT COMPLICATION, WITHOUT LONG-TERM CURRENT USE OF INSULIN (HCC): ICD-10-CM

## 2020-10-30 DIAGNOSIS — I10 ESSENTIAL HYPERTENSION: ICD-10-CM

## 2020-10-30 DIAGNOSIS — E78.5 HYPERLIPIDEMIA LDL GOAL <70: ICD-10-CM

## 2020-10-30 DIAGNOSIS — Z00.00 ROUTINE GENERAL MEDICAL EXAMINATION AT A HEALTH CARE FACILITY: ICD-10-CM

## 2020-10-30 DIAGNOSIS — Z00.00 MEDICARE ANNUAL WELLNESS VISIT, SUBSEQUENT: Primary | ICD-10-CM

## 2020-10-30 PROCEDURE — 90694 VACC AIIV4 NO PRSRV 0.5ML IM: CPT | Performed by: NURSE PRACTITIONER

## 2020-10-30 PROCEDURE — 36415 COLL VENOUS BLD VENIPUNCTURE: CPT

## 2020-10-30 PROCEDURE — 96160 PT-FOCUSED HLTH RISK ASSMT: CPT | Performed by: NURSE PRACTITIONER

## 2020-10-30 PROCEDURE — G0008 ADMIN INFLUENZA VIRUS VAC: HCPCS | Performed by: NURSE PRACTITIONER

## 2020-10-30 PROCEDURE — G0439 PPPS, SUBSEQ VISIT: HCPCS | Performed by: NURSE PRACTITIONER

## 2020-10-30 NOTE — PROGRESS NOTES
The ABCs of the Annual Wellness Visit  Subsequent Medicare Wellness Visit    Chief Complaint   Patient presents with   • Annual Exam     Patient completed blood work this morning        Subjective   History of Present Illness:  Christian Mcintosh is a 75 y.o. male who presents for a Subsequent Medicare Wellness Visit.    HEALTH RISK ASSESSMENT    Recent Hospitalizations:  No hospitalization(s) within the last year.    Current Medical Providers:  Patient Care Team:  Merrill De La Paz MD as PCP - General (Internal Medicine)  Marshal Spangler IV, MD as Cardiologist (Interventional Cardiology)  Silvio Ahn MD as Consulting Physician (Urology)    Smoking Status:  Social History     Tobacco Use   Smoking Status Former Smoker   • Packs/day: 2.00   • Years: 20.00   • Pack years: 40.00   • Types: Cigarettes   • Quit date:    • Years since quittin.8   Smokeless Tobacco Never Used       Alcohol Consumption:  Social History     Substance and Sexual Activity   Alcohol Use Yes   • Alcohol/week: 2.0 standard drinks   • Types: 2 Shots of liquor per week   • Frequency: Monthly or less   • Drinks per session: 1 or 2   • Binge frequency: Never    Comment: occasional        Depression Screen:   PHQ-2/PHQ-9 Depression Screening 10/30/2020   Little interest or pleasure in doing things 1   Feeling down, depressed, or hopeless 1   Trouble falling or staying asleep, or sleeping too much 1   Feeling tired or having little energy 1   Poor appetite or overeating 0   Feeling bad about yourself - or that you are a failure or have let yourself or your family down 0   Trouble concentrating on things, such as reading the newspaper or watching television 0   Moving or speaking so slowly that other people could have noticed. Or the opposite - being so fidgety or restless that you have been moving around a lot more than usual 0   Thoughts that you would be better off dead, or of hurting yourself in some way 0   Total Score 4   If you  checked off any problems, how difficult have these problems made it for you to do your work, take care of things at home, or get along with other people? Not difficult at all       Fall Risk Screen:  BEBETO Fall Risk Assessment was completed, and patient is at LOW risk for falls.Assessment completed on:10/30/2020    Health Habits and Functional and Cognitive Screening:  Functional & Cognitive Status 10/30/2020   Do you have difficulty preparing food and eating? No   Do you have difficulty bathing yourself, getting dressed or grooming yourself? No   Do you have difficulty using the toilet? No   Do you have difficulty moving around from place to place? No   Do you have trouble with steps or getting out of a bed or a chair? No   Current Diet Unhealthy Diet   Dental Exam Up to date   Eye Exam Up to date   Exercise (times per week) 5 times per week   Current Exercise Activities Include Cardiovasular Workout on Exercise Equipment   Do you need help using the phone?  No   Are you deaf or do you have serious difficulty hearing?  No   Do you need help with transportation? No   Do you need help shopping? No   Do you need help preparing meals?  No   Do you need help with housework?  No   Do you need help with laundry? No   Do you need help taking your medications? No   Do you need help managing money? No   Do you ever drive or ride in a car without wearing a seat belt? No   Have you felt unusual stress, anger or loneliness in the last month? Yes   Who do you live with? Spouse   If you need help, do you have trouble finding someone available to you? No   Have you been bothered in the last four weeks by sexual problems? Yes   Do you have difficulty concentrating, remembering or making decisions? Yes         Does the patient have evidence of cognitive impairment? No    Asprin use counseling:Taking ASA appropriately as indicated    Age-appropriate Screening Schedule:  Refer to the list below for future screening recommendations  based on patient's age, sex and/or medical conditions. Orders for these recommended tests are listed in the plan section. The patient has been provided with a written plan.    Health Maintenance   Topic Date Due   • ZOSTER VACCINE (2 of 3) 06/05/2015   • DIABETIC FOOT EXAM  04/10/2020   • INFLUENZA VACCINE  08/01/2020   • DIABETIC EYE EXAM  10/14/2020   • HEMOGLOBIN A1C  10/23/2020   • TDAP/TD VACCINES (2 - Td) 01/10/2021   • LIPID PANEL  04/23/2021   • URINE MICROALBUMIN  04/23/2021   • COLONOSCOPY  10/21/2025          The following portions of the patient's history were reviewed and updated as appropriate: allergies, current medications, past family history, past medical history, past social history, past surgical history and problem list.    Outpatient Medications Prior to Visit   Medication Sig Dispense Refill   • amitriptyline (ELAVIL) 10 MG tablet Take 1 tablet by mouth Daily.  0   • anastrozole (ARIMIDEX) 1 MG tablet Take 1 mg by mouth 2 (Two) Times a Week.     • B Complex Vitamins (VITAMIN B COMPLEX) capsule capsule Take 1 capsule by mouth Daily.     • Cholecalciferol (VITAMIN D3) 5000 units capsule capsule Take 5,000 Units by mouth Daily.     • Coenzyme Q10 (CO Q 10 PO) Take 1 capsule by mouth Daily.     • cyclobenzaprine (FLEXERIL) 5 MG tablet Take 1/2 tablet twice daily for muscle spasm 15 tablet 1   • DHEA 25 MG capsule Take 25 mg by mouth Daily.     • Docusate Sodium (STOOL SOFTENER) 100 MG capsule Take 1 tablet by mouth daily.     • levETIRAcetam (KEPPRA) 500 MG tablet TAKE 1/2 TABLET BY MOUTH ONCE DAILY 45 tablet 3   • lisinopril (PRINIVIL,ZESTRIL) 10 MG tablet Take 1 tablet by mouth Daily. 90 tablet 3   • metFORMIN (GLUCOPHAGE) 500 MG tablet Take 1 tablet by mouth Daily. 90 tablet 3   • Multiple Vitamins-Minerals (CENTRUM ADULTS PO) Take 1 tablet by mouth daily.     • Omega-3 Fatty Acids (FISH OIL PO) Take 1 tablet by mouth Daily.     • pantoprazole (PROTONIX) 40 MG EC tablet Take 1 tablet by mouth  Daily. 90 tablet 3   • RA ASPIRIN EC 81 MG EC tablet take 1 tablet by mouth once daily 90 tablet 3   • sildenafil (REVATIO) 20 MG tablet Take 20 mg by mouth As Needed.     • simvastatin (ZOCOR) 10 MG tablet Take 1 tablet by mouth Every Evening. 90 tablet 3   • TESTOSTERONE CYPIONATE IJ Inject 0.7 mg as directed 1 (One) Time Per Week.     • aspirin 81 MG chewable tablet Chew 81 mg Daily.       No facility-administered medications prior to visit.        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 Patient has an advance directive (not in EMR), copy requested.    Review of Systems   Constitutional: Negative for chills, fatigue and fever.   HENT: Negative for congestion, ear pain and sinus pressure.    Respiratory: Negative for cough, chest tightness, shortness of breath and wheezing.    Cardiovascular: Negative for chest pain and palpitations.   Gastrointestinal: Negative for abdominal pain, blood in stool and constipation.   Skin: Negative for color change.   Allergic/Immunologic: Negative for environmental allergies.   Neurological: Negative for dizziness, speech difficulty and headaches.   Psychiatric/Behavioral: Negative for  "confusion. The patient is not nervous/anxious.        Compared to one year ago, the patient feels his physical health is worse.  Compared to one year ago, the patient feels his mental health is the same.    Reviewed chart for potential of high risk medication in the elderly: yes  Reviewed chart for potential of harmful drug interactions in the elderly:yes    Objective         Vitals:    10/30/20 1318   BP: 98/74   BP Location: Left arm   Patient Position: Sitting   Cuff Size: Adult   Pulse: 76   Temp: 96.8 °F (36 °C)   TempSrc: Temporal   Weight: 77.1 kg (170 lb)   Height: 167.6 cm (65.98\")   PainSc: 0-No pain       Body mass index is 27.45 kg/m².  Discussed the patient's BMI with him. The BMI is above average; BMI management plan is completed.    Physical Exam  Vitals signs reviewed.   Constitutional:       Appearance: He is well-developed.   Pulmonary:      Effort: Pulmonary effort is normal.   Skin:     General: Skin is warm and dry.   Neurological:      Mental Status: He is alert.   Psychiatric:         Behavior: Behavior normal.               Assessment/Plan   Medicare Risks and Personalized Health Plan  CMS Preventative Services Quick Reference  Immunizations Discussed/Encouraged (specific immunizations; Influenza and Shingrix )    The above risks/problems have been discussed with the patient.  Pertinent information has been shared with the patient in the After Visit Summary.  Follow up plans and orders are seen below in the Assessment/Plan Section.    There are no diagnoses linked to this encounter.  Follow Up:  Return for Next scheduled follow up.     An After Visit Summary and PPPS were given to the patient.             "

## 2020-10-30 NOTE — PATIENT INSTRUCTIONS
Medicare Wellness  Personal Prevention Plan of Service     Date of Office Visit:  10/30/2020  Encounter Provider:  RITCHIE Denney  Place of Service:  [facility] INTERNAL MEDICINE  Patient Name: Christian Mcintosh  :  1945    As part of the Medicare Wellness portion of your visit today, we are providing you with this personalized preventive plan of services (PPPS). This plan is based upon recommendations of the [place] Preventive Services Task Force (USPSTF) and the Advisory Committee on Immunization Practices (ACIP).    This lists the preventive care services that should be considered, and provides dates of when you are due. Items listed as completed are up-to-date and do not require any further intervention.    Health Maintenance   Topic Date Due   • ZOSTER VACCINE (2 of 3) 2015   • HEPATITIS C SCREENING  2017   • AAA SCREEN (ONE-TIME)  04/10/2019   • DIABETIC FOOT EXAM  04/10/2020   • INFLUENZA VACCINE  2020   • ANNUAL WELLNESS VISIT  10/08/2020   • DIABETIC EYE EXAM  10/14/2020   • HEMOGLOBIN A1C  10/23/2020   • TDAP/TD VACCINES (2 - Td) 01/10/2021   • LIPID PANEL  2021   • URINE MICROALBUMIN  2021   • COLONOSCOPY  10/21/2025   • Pneumococcal Vaccine 65+  Completed       Orders Placed This Encounter   Procedures   • Fluad Quad >65 years       Return for Next scheduled follow up.

## 2020-11-05 ENCOUNTER — OFFICE VISIT (OUTPATIENT)
Dept: INTERNAL MEDICINE | Facility: CLINIC | Age: 75
End: 2020-11-05

## 2020-11-05 VITALS
WEIGHT: 171 LBS | BODY MASS INDEX: 27.48 KG/M2 | DIASTOLIC BLOOD PRESSURE: 70 MMHG | HEART RATE: 72 BPM | TEMPERATURE: 97.3 F | HEIGHT: 66 IN | SYSTOLIC BLOOD PRESSURE: 110 MMHG

## 2020-11-05 DIAGNOSIS — E78.5 HYPERLIPIDEMIA LDL GOAL <70: Primary | ICD-10-CM

## 2020-11-05 DIAGNOSIS — G40.409 GRAND MAL SEIZURE (HCC): ICD-10-CM

## 2020-11-05 DIAGNOSIS — I25.10 CORONARY ARTERY DISEASE INVOLVING NATIVE CORONARY ARTERY OF NATIVE HEART WITHOUT ANGINA PECTORIS: ICD-10-CM

## 2020-11-05 DIAGNOSIS — M19.211 SECONDARY OSTEOARTHRITIS OF RIGHT SHOULDER DUE TO ROTATOR CUFF TEAR: ICD-10-CM

## 2020-11-05 DIAGNOSIS — I10 ESSENTIAL HYPERTENSION: ICD-10-CM

## 2020-11-05 DIAGNOSIS — M75.101 SECONDARY OSTEOARTHRITIS OF RIGHT SHOULDER DUE TO ROTATOR CUFF TEAR: ICD-10-CM

## 2020-11-05 DIAGNOSIS — I48.3 TYPICAL ATRIAL FLUTTER (HCC): ICD-10-CM

## 2020-11-05 DIAGNOSIS — E11.9 TYPE 2 DIABETES MELLITUS WITHOUT COMPLICATION, WITHOUT LONG-TERM CURRENT USE OF INSULIN (HCC): ICD-10-CM

## 2020-11-05 DIAGNOSIS — Z95.3 STATUS POST AORTIC VALVE REPLACEMENT WITH BIOPROSTHETIC VALVE: ICD-10-CM

## 2020-11-05 DIAGNOSIS — E29.1 TESTICULAR HYPOFUNCTION: ICD-10-CM

## 2020-11-05 DIAGNOSIS — M17.0 PRIMARY OSTEOARTHRITIS OF BOTH KNEES: ICD-10-CM

## 2020-11-05 PROCEDURE — 93000 ELECTROCARDIOGRAM COMPLETE: CPT | Performed by: INTERNAL MEDICINE

## 2020-11-05 PROCEDURE — 99214 OFFICE O/P EST MOD 30 MIN: CPT | Performed by: INTERNAL MEDICINE

## 2020-11-05 RX ORDER — SILDENAFIL CITRATE 20 MG/1
20 TABLET ORAL AS NEEDED
Qty: 100 TABLET | Refills: 2 | Status: SHIPPED | OUTPATIENT
Start: 2020-11-05 | End: 2022-10-19

## 2020-11-05 NOTE — ASSESSMENT & PLAN NOTE
History of diabetes type 2 with positive family history of diabetes currently on Metformin 500 mg daily denies GI upset discomfort or diarrhea recent A1c 6.30 continue therapy

## 2020-11-05 NOTE — ASSESSMENT & PLAN NOTE
Degenerative arthritis disease both knees right greater than left on NSAID therapy twice daily with meals continue follow-up with as needed injections no orthopedic referral at this time.

## 2020-11-05 NOTE — ASSESSMENT & PLAN NOTE
History of mixed hyperlipidemia and coronary artery disease aortic valve replacement on simvastatin 10 mg daily denies myalgia arthralgia continue therapy

## 2020-11-05 NOTE — ASSESSMENT & PLAN NOTE
Status post aortic valve replacement 2013 currently stable and asymptomatic on aspirin 81 daily and lisinopril 10 mg daily has an appointment for echocardiogram April 2021 currently asymptomatic and stable

## 2020-11-05 NOTE — ASSESSMENT & PLAN NOTE
History of essential hypertension with current blood pressure 110/70 repeated 112/70 on lisinopril 10 mg daily denies headache or cough continue therapy

## 2020-11-05 NOTE — ASSESSMENT & PLAN NOTE
Cardiac cath March 2013 with mild nonobstructive coronary artery disease currently stable without chest pain pressure tightness palpitations on simvastatin 10 mg lisinopril 10 mg, and aspirin 81 mg daily no change therapy

## 2020-11-05 NOTE — ASSESSMENT & PLAN NOTE
Pain reduced range of motion and soreness with any arm movement or activity, the patient does take NSAID therapy twice daily with food for only minor relief suggest referred to Dr. Peraza

## 2020-11-05 NOTE — ASSESSMENT & PLAN NOTE
Remote atrial flutter with cardiac cath 2013 and ablation 2013 currently stable rhythm with first-degree AV block with occasional premature asymptomatic

## 2020-11-05 NOTE — PROGRESS NOTES
[facility]     Christian Mcintosh  1945   [id number]      Patient Care Team:  Merrill De La Paz MD as PCP - General (Internal Medicine)  Marshal Spangler IV, MD as Cardiologist (Interventional Cardiology)  Silvio Ahn MD as Consulting Physician (Urology)    Chief Complaint::   Chief Complaint   Patient presents with   • Hypertension     AWV  Part 2   • Diabetes            HPI  Patient 75-year-old male with a history of coronary artery disease nonobstructive essential hypertension stable on lisinopril 10 with a history of atrial flutter who has AV node ablation and watchman procedure with Tiger Paw device, diabetes type 2 on Metformin mixed hyperlipidemia on simvastatin and remote seizure disorder on Keppra with last seizure in 2015 overall feeling well with exception of bilateral knee pain right greater than left and right shoulder discomfort pain.  Patient is a non-smoker for 35 years has occasional alcohol drink does have a living well no recent changes in diet medication or activity he sleeps well has nocturia x1 denies headache or dizzy is remote bilateral cataract denies chronic allergy denies dysphagia denies cough or wheeze or recent lung infections denies chest pain pressure palpitations denies nausea vomiting last colonoscopy was 2015 has nocturia x1 and complains of extremity discomfort.      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 Padilla   • Benign prostatic hypertrophy w elevated PSA 01/03/2018    BX NO CANCER   • Bicuspid aortic valve    • Cataract    • Coronary artery disease 03/06/2013   • Diverticulosis 2002   • Gastric ulcer 2002   • GERD (gastroesophageal reflux disease)    • HM-Rtmqixr-Zydxf tear 07/23/2013   • H/O atrial flutter    • History of basal cell cancer 2007   • History of echocardiogram    • History of rotator cuff tear    • Hyperlipidemia    • Hypertension    • Osteoarthritis 2011   • PONV (postoperative nausea and vomiting)    • Seizure (CMS/HCC)    • Skin cancer     skin.  BASAL CELL SKIN CANCER LEFT CHEEK   • Status post shoulder surgery    • Tear of left rotator cuff    • Tinnitus    • Urinary retention     king       Past Surgical History:   Procedure Laterality Date   • BASAL CELL CARCINOMA EXCISION  2007   • BONE SPUR ARM     • CARDIAC ABLATION     • CARDIAC CATHETERIZATION  03/06/2013   • CARDIAC VALVE REPLACEMENT  03/26/2013    bioprosthetic aortic valve   • CARDIAC VALVE REPLACEMENT     • CARPAL TUNNEL RELEASE     • CATARACT EXTRACTION     • COLONOSCOPY     • COLONOSCOPY  08/29/2012   • CYST REMOVAL     • CYSTOSCOPY TRANSURETHRAL RESECTION OF PROSTATE N/A 1/3/2018    Procedure: PROSTATE ULTRASOUND AND BIOPSY, CYSTOSCOPY TRANSURETHRAL RESECTION OF PROSTATE GREENLIGHT WITH VAPORIZATION;  Surgeon: Silvio Ahn MD;  Location: [facility];  Service:    • ENDOSCOPY  2002    w/biopsy   • EYE SURGERY      bilateral cataract extraction   • KING CATHETER  11/01/2016   • HERNIA REPAIR  2010   • KNEE ARTHROSCOPY     • KNEE MENISCAL REPAIR Bilateral    • KNEE SURGERY  Left    • LASIK     • PROSTATE BIOPSY  2018   • ROTATOR CUFF REPAIR  10/26/2016   • SHOULDER SURGERY Left    • SKIN CANCER EXCISION  2016    left cheek   • SKIN CANCER EXCISION     • TURP / TRANSURETHRAL INCISION / DRAINAGE PROSTATE     • VASECTOMY         Family History   Problem Relation Age of Onset   • No Known Problems Mother    • Heart attack Father 60   • Coronary artery disease Father    • Heart disease Brother    • Diabetes Brother    • Diabetes Sister    • Other Son         -accident       Social History     Socioeconomic History   • Marital status:      Spouse name: Not on file   • Number of children: Not on file   • Years of education: Not on file   • Highest education level: Not on file   Tobacco Use   • Smoking status: Former Smoker     Packs/day: 2.00     Years: 23.00     Pack years: 46.00     Types: Cigarettes     Start date: 1963     Quit date:      Years since quittin.8   • Smokeless tobacco: Never Used   Substance and Sexual Activity   • Alcohol use: Yes     Alcohol/week: 1.0 standard drinks     Types: 1 Shots of liquor per week     Frequency: Monthly or less     Drinks per session: 1 or 2     Binge frequency: Never     Comment: occasional    • Drug use: No   • Sexual activity: Defer       Allergies   Allergen Reactions   • Shellfish-Derived Products Anaphylaxis       Review of Systems   Constitutional: Negative for chills, fatigue and fever.   HENT: Negative for congestion, ear pain and sinus pressure.    Eyes: Positive for visual disturbance.        Remote bilateral cataract   Respiratory: Negative for cough, chest tightness, shortness of breath and wheezing.    Cardiovascular: Negative for chest pain and palpitations.   Gastrointestinal: Negative for abdominal pain, blood in stool, constipation, diarrhea and GERD.   Endocrine: Negative for cold intolerance and heat intolerance.   Genitourinary: Positive for nocturia and erectile dysfunction. Negative for  "dysuria and frequency.   Musculoskeletal: Positive for arthralgias, gait problem and joint swelling. Negative for back pain and bursitis.        Remote left shoulder arthroscopic surgery, bilateral knee arthritic changes, with severe pain reduced range of motion right shoulder   Skin: Negative for color change.   Allergic/Immunologic: Negative for environmental allergies.   Neurological: Positive for seizures. Negative for dizziness, speech difficulty and headache.   Psychiatric/Behavioral: Negative for decreased concentration. The patient is not nervous/anxious.           Vital Signs  Vitals:    11/05/20 0844   BP: 110/70   BP Location: Left arm   Patient Position: Sitting   Cuff Size: Adult   Pulse: 72   Temp: 97.3 °F (36.3 °C)   TempSrc: Infrared   Weight: 77.6 kg (171 lb)   Height: 167.6 cm (66\")   PainSc: 0-No pain     Body mass index is 27.6 kg/m².  Patient's Body mass index is 27.6 kg/m². BMI is within normal parameters. No follow-up required..     Advance Care Planning   ACP discussion was held with the patient during this visit. Patient has an advance directive in EMR which is still valid.        Current Outpatient Medications:   •  amitriptyline (ELAVIL) 10 MG tablet, Take 1 tablet by mouth Daily., Disp: , Rfl: 0  •  anastrozole (ARIMIDEX) 1 MG tablet, Take 1 mg by mouth 2 (Two) Times a Week., Disp: , Rfl:   •  B Complex Vitamins (VITAMIN B COMPLEX) capsule capsule, Take 1 capsule by mouth Daily., Disp: , Rfl:   •  Cholecalciferol (VITAMIN D3) 5000 units capsule capsule, Take 5,000 Units by mouth Daily., Disp: , Rfl:   •  Coenzyme Q10 (CO Q 10 PO), Take 1 capsule by mouth Daily., Disp: , Rfl:   •  DHEA 25 MG capsule, Take 25 mg by mouth Daily., Disp: , Rfl:   •  Docusate Sodium (STOOL SOFTENER) 100 MG capsule, Take 1 tablet by mouth daily., Disp: , Rfl:   •  levETIRAcetam (KEPPRA) 500 MG tablet, TAKE 1/2 TABLET BY MOUTH ONCE DAILY, Disp: 45 tablet, Rfl: 3  •  lisinopril (PRINIVIL,ZESTRIL) 10 MG tablet, " Take 1 tablet by mouth Daily., Disp: 90 tablet, Rfl: 3  •  metFORMIN (GLUCOPHAGE) 500 MG tablet, Take 1 tablet by mouth Daily., Disp: 90 tablet, Rfl: 3  •  Multiple Vitamins-Minerals (CENTRUM ADULTS PO), Take 1 tablet by mouth daily., Disp: , Rfl:   •  Omega-3 Fatty Acids (FISH OIL PO), Take 1 tablet by mouth Daily., Disp: , Rfl:   •  pantoprazole (PROTONIX) 40 MG EC tablet, Take 1 tablet by mouth Daily., Disp: 90 tablet, Rfl: 3  •  RA ASPIRIN EC 81 MG EC tablet, take 1 tablet by mouth once daily, Disp: 90 tablet, Rfl: 3  •  sildenafil (REVATIO) 20 MG tablet, Take 1 tablet by mouth As Needed (as needed for ED). Take 3 as needed, Disp: 100 tablet, Rfl: 2  •  simvastatin (ZOCOR) 10 MG tablet, Take 1 tablet by mouth Every Evening., Disp: 90 tablet, Rfl: 3  •  TESTOSTERONE CYPIONATE IJ, Inject 0.7 mg as directed 1 (One) Time Per Week., Disp: , Rfl:   •  cyclobenzaprine (FLEXERIL) 5 MG tablet, Take 1/2 tablet twice daily for muscle spasm, Disp: 15 tablet, Rfl: 1    Physical Exam     ACE III MINI         HEENT: Pupils equal reactive remote bilateral cataract ENT clear bilateral cerumen no asymmetry pharynx is clear  NECK: No mass bruit thyromegaly or neck vein distention  CHEST: Clear without rales wheezes or rhonchi  CARDIAC: Regular rhythm without gallop or rub I do not hear a murmur of aortic valve prosthesis blood pressure stable  ABD: Liver spleen normal positive bowel sounds no bruit  : Deferred  NEURO: Intact no neuropathy  PSYCH: Normal  EXTREM: Bilateral knee arthritis mild left and right wearing a right knee brace severe limited range of motion and pain right shoulder  Skin: Clear     Results Review:    No results found for this or any previous visit (from the past 672 hour(s)).    ECG 12 Lead    Date/Time: 11/5/2020 10:30 AM  Performed by: Merrill De La Paz MD  Authorized by: Merrill De La Paz MD   Rhythm: sinus rhythm  Ectopy: infrequent PVCs  Rate: normal  Conduction: 1st degree AV block  QRS axis:  left    Clinical impression: abnormal EKG  Comments: EKG sinus rhythm first-degree AV block of 0.2-1 with left axis, no ischemia nonspecific ST and T wave changes.        Patient Wellness Counseling:   Plan of care reviewed with patient at the conclusion of today's visit. Education was provided in regards to diagnosis, diet and exercise, prostate cancer screening discussed including benefit of early detection, potential need for follow-up, and harms associated with additional management. Patient agrees to screening.    Nutrition, physical activity, healthy weight,ways to reduce stress, adequate sleep, injury prevention, misuse of tobacco, alcohol and drugs, sexual behavior and STD's, dental health, mental health, and immunizations.    Plan of care reviewed with patient at the conclusion of today's visit. Education was provided regarding diagnosis, management and any prescribed or recommended OTC medications.  Patient verbalizes understanding of and agreement with management plan.        Medication Review: Medications reviewed and noted    Patient wellness counseling  Exercise: Continue walking exercise  Diet: Continue healthy cardiac diet  Smoking: Non-smoker 35 years  Alcohol: Occasional alcohol  Screening: Recent lab discussed EKG discussed    Assessment/Plan:    Problem List Items Addressed This Visit        Cardiovascular and Mediastinum    Coronary artery disease involving native coronary artery of native heart without angina pectoris    Overview     · Cardiac catheterization (03/06/2013): mild non obstructive CAD         Current Assessment & Plan       Cardiac cath March 2013 with mild nonobstructive coronary artery disease currently stable without chest pain pressure tightness palpitations on simvastatin 10 mg lisinopril 10 mg, and aspirin 81 mg daily no change therapy         Relevant Medications    sildenafil (REVATIO) 20 MG tablet    Other Relevant Orders    ECG 12 Lead    Hyperlipidemia LDL goal <70 -  Primary    Overview     · Statin therapy indicated given presence of coronary artery disease         Current Assessment & Plan       History of mixed hyperlipidemia and coronary artery disease aortic valve replacement on simvastatin 10 mg daily denies myalgia arthralgia continue therapy         Relevant Medications    simvastatin (ZOCOR) 10 MG tablet    Typical atrial flutter (CMS/HCC)    Overview     · Diagnosed postoperatively, asymptomatic.  · Typical flutter pattern on ECG, 05/03/2013. Initiated on Coumadin.  · Left atrial appendage excluded with AVR using Tiger Paw occluder.  · Radiofrequency ablation by Dr. Brooks Padilla, July 2013.   · Coumadin initiated following ablation but stopped after development of GI bleeding and a Tarsha-Villeda tear.          Current Assessment & Plan     Remote atrial flutter with cardiac cath 2013 and ablation 2013 currently stable rhythm with first-degree AV block with occasional premature asymptomatic         Relevant Medications    sildenafil (REVATIO) 20 MG tablet    Other Relevant Orders    ECG 12 Lead    Essential hypertension    Overview     History of essential hypertension currently on lisinopril 10 mg daily denies headache or cough         Current Assessment & Plan       History of essential hypertension with current blood pressure 110/70 repeated 112/70 on lisinopril 10 mg daily denies headache or cough continue therapy         Relevant Medications    lisinopril (PRINIVIL,ZESTRIL) 10 MG tablet    Other Relevant Orders    ECG 12 Lead       Endocrine    Type 2 diabetes mellitus without complication, without long-term current use of insulin (CMS/HCC)    Overview     History of diabetes type 2 on metformin 500 mg daily with last blood work in acceptable fasting blood sugar of 98 and A1c in October 2019         Current Assessment & Plan       History of diabetes type 2 with positive family history of diabetes currently on Metformin 500 mg daily denies GI upset discomfort or  diarrhea recent A1c 6.30 continue therapy         Relevant Medications    metFORMIN (GLUCOPHAGE) 500 MG tablet    Testicular hypofunction       Nervous and Auditory    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         Current Assessment & Plan     History of seizure disorder with last seizure 2015 currently on Keppra and no recurrent seizures and no side effects taking Keppra 250 mg nightly            Musculoskeletal and Integument    Primary osteoarthritis of both knees    Overview     Degenerative arthritis disease primarily of the shoulders and knees with previous injections of Synvisc in both knees currently symptomatic soreness in the right knee         Current Assessment & Plan     Degenerative arthritis disease both knees right greater than left on NSAID therapy twice daily with meals continue follow-up with as needed injections no orthopedic referral at this time.         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 Peraza         Current Assessment & Plan     Pain reduced range of motion and soreness with any arm movement or activity, the patient does take NSAID therapy twice daily with food for only minor relief suggest referred to Dr. Peraza            Other    Status post aortic valve replacement with bioprosthetic valve    Overview     · Echocardiogram (08/08/2012): Moderate AS, KLAUDIA of 0.8 cm2.  · Echocardiogram (02/26/2013): LVEF 55%, severe AS with mean gradient of 65 mmHg.   · Bioprosthetic aortic valve replacement by Dr. Stuart Griffin (03/26/2013): 25-mm Magna Ease with ascending aortoplasty.  Left atrial appendage excluded  · Echo (6/15/18): LVEF 48%. Normal functioning bioprosthetic aortic valve. Trivial paravalvular leak.         Current Assessment & Plan     Status post aortic valve replacement 2013 currently stable and asymptomatic on aspirin 81 daily and lisinopril 10 mg daily has an appointment for echocardiogram April 2021 currently asymptomatic and stable         Relevant Orders    ECG 12 Lead           Patient Instructions   Recent labs discussed with A1c of 6.30 and stable blood sugar kidney function and liver function PSA and thyroid and CBC  EKG done and discussed  Continue all current therapy  Encouraged continued exercise  Encouraged continued healthy cardiac diet  Referral to orthopedist Dr. Peraza for right shoulder assessment  Return visit in 6 months or as needed  Renewed prescription for sildenafil       Plan of care reviewed with patient at the conclusion of today's visit. Education was provided regarding diagnosis, management, and any prescribed or recommended OTC medications.Patient verbalizes understanding of and agreement with management plan.         Merrill De La Paz MD      Note: Part of this note may be an electronic transcription/translation of spoken language to printed text using the Dragon Dictation system.

## 2020-11-05 NOTE — PATIENT INSTRUCTIONS
Recent labs discussed with A1c of 6.30 and stable blood sugar kidney function and liver function PSA and thyroid and CBC  EKG done and discussed  Continue all current therapy  Encouraged continued exercise  Encouraged continued healthy cardiac diet  Referral to orthopedist Dr. Peraza for right shoulder assessment  Return visit in 6 months or as needed  Renewed prescription for sildenafil

## 2020-11-05 NOTE — ASSESSMENT & PLAN NOTE
History of seizure disorder with last seizure 2015 currently on Keppra and no recurrent seizures and no side effects taking Keppra 250 mg nightly

## 2021-01-23 DIAGNOSIS — E78.5 HYPERLIPIDEMIA LDL GOAL <70: ICD-10-CM

## 2021-01-25 RX ORDER — SIMVASTATIN 10 MG
10 TABLET ORAL EVERY EVENING
Qty: 90 TABLET | Refills: 3 | Status: SHIPPED | OUTPATIENT
Start: 2021-01-25 | End: 2022-01-11

## 2021-02-26 ENCOUNTER — IMMUNIZATION (OUTPATIENT)
Dept: VACCINE CLINIC | Facility: HOSPITAL | Age: 76
End: 2021-02-26

## 2021-02-26 PROCEDURE — 0011A: CPT | Performed by: INTERNAL MEDICINE

## 2021-02-26 PROCEDURE — 91301 HC SARSCO02 VAC 100MCG/0.5ML IM: CPT | Performed by: INTERNAL MEDICINE

## 2021-03-26 ENCOUNTER — IMMUNIZATION (OUTPATIENT)
Dept: VACCINE CLINIC | Facility: HOSPITAL | Age: 76
End: 2021-03-26

## 2021-03-26 PROCEDURE — 0012A: CPT | Performed by: PHYSICIAN ASSISTANT

## 2021-03-26 PROCEDURE — 91301 HC SARSCO02 VAC 100MCG/0.5ML IM: CPT | Performed by: PHYSICIAN ASSISTANT

## 2021-04-22 ENCOUNTER — TELEPHONE (OUTPATIENT)
Dept: INTERNAL MEDICINE | Facility: CLINIC | Age: 76
End: 2021-04-22

## 2021-04-22 RX ORDER — PANTOPRAZOLE SODIUM 40 MG/1
40 TABLET, DELAYED RELEASE ORAL DAILY
Qty: 90 TABLET | Refills: 3 | Status: SHIPPED | OUTPATIENT
Start: 2021-04-22 | End: 2022-04-11

## 2021-04-22 NOTE — TELEPHONE ENCOUNTER
All lab orders have been placed patient can have the fasting lab anytime before the appointment please inform patient

## 2021-04-22 NOTE — TELEPHONE ENCOUNTER
Caller: Christian Mcintosh    Relationship: Self    Best call back number: [phone]  PERMISSION TO LEAVE MESSAGE    What orders are you requesting (i.e. lab or imaging): LABS    In what timeframe would the patient need to come in: 4/23    [facility] your [facility]imaging [facility]    Additional notes:

## 2021-04-23 ENCOUNTER — OFFICE VISIT (OUTPATIENT)
Dept: CARDIOLOGY | Facility: CLINIC | Age: 76
End: 2021-04-23

## 2021-04-23 ENCOUNTER — HOSPITAL ENCOUNTER (OUTPATIENT)
Dept: CARDIOLOGY | Facility: HOSPITAL | Age: 76
Discharge: HOME | End: 2021-04-23
Admitting: NURSE PRACTITIONER

## 2021-04-23 VITALS
WEIGHT: 172 LBS | HEIGHT: 67 IN | OXYGEN SATURATION: 97 % | SYSTOLIC BLOOD PRESSURE: 118 MMHG | HEART RATE: 67 BPM | BODY MASS INDEX: 27 KG/M2 | DIASTOLIC BLOOD PRESSURE: 68 MMHG

## 2021-04-23 DIAGNOSIS — I25.10 CORONARY ARTERY DISEASE INVOLVING NATIVE CORONARY ARTERY OF NATIVE HEART WITHOUT ANGINA PECTORIS: Primary | ICD-10-CM

## 2021-04-23 DIAGNOSIS — E78.5 HYPERLIPIDEMIA LDL GOAL <70: ICD-10-CM

## 2021-04-23 DIAGNOSIS — I10 ESSENTIAL HYPERTENSION: ICD-10-CM

## 2021-04-23 DIAGNOSIS — Z95.3 STATUS POST AORTIC VALVE REPLACEMENT WITH BIOPROSTHETIC VALVE: ICD-10-CM

## 2021-04-23 PROCEDURE — 99214 OFFICE O/P EST MOD 30 MIN: CPT | Performed by: INTERNAL MEDICINE

## 2021-04-23 PROCEDURE — 93306 TTE W/DOPPLER COMPLETE: CPT | Performed by: INTERNAL MEDICINE

## 2021-04-23 PROCEDURE — 93306 TTE W/DOPPLER COMPLETE: CPT

## 2021-04-23 RX ORDER — ASPIRIN 81 MG/1
81 TABLET ORAL DAILY
Qty: 90 TABLET | Refills: 3
Start: 2021-04-23

## 2021-04-23 RX ORDER — AMOXICILLIN 500 MG/1
2000 CAPSULE ORAL SEE ADMIN INSTRUCTIONS
Qty: 12 CAPSULE | Refills: 0 | Status: SHIPPED | OUTPATIENT
Start: 2021-04-23 | End: 2021-11-10 | Stop reason: SDUPTHER

## 2021-04-23 RX ORDER — LISINOPRIL 10 MG/1
10 TABLET ORAL DAILY
Qty: 90 TABLET | Refills: 3 | Status: SHIPPED | OUTPATIENT
Start: 2021-04-23 | End: 2022-07-12 | Stop reason: SDUPTHER

## 2021-04-23 RX ORDER — LEVETIRACETAM 500 MG/1
TABLET ORAL
Qty: 45 TABLET | Refills: 3 | Status: SHIPPED | OUTPATIENT
Start: 2021-04-23 | End: 2022-04-11

## 2021-04-23 RX ORDER — LISINOPRIL 10 MG/1
10 TABLET ORAL DAILY
Qty: 90 TABLET | Refills: 3 | Status: SHIPPED | OUTPATIENT
Start: 2021-04-23 | End: 2021-04-23 | Stop reason: SDUPTHER

## 2021-04-23 NOTE — PROGRESS NOTES
"[facility]      Identification: Christian Mcintosh is a 75 y.o. male who resides in [place], [place].     Reason for visit:  · Bioprosthetic aortic valve  · Follow-up echo results from today  · Mild CAD  · CAD risk factor      Subjective      Christian Mcintosh presents to [facility] for followup.    History of Present Illness  The patient reports he has been doing well overall. He has continued to go to the gym. He states that he is having surgery on his left shoulder in the near future. He also requires total arthroplasty on his bilateral knees. He did have his right shoulder operated on in the past. There have been no significant changes to his medication regimen. He was started on metformin due to a borderline A1c. He has a family history of diabetes mellitus type 2 in his mother, father, and siblings. Additionally, he states he has been told he has an irregular heartbeat but does not have any symptoms secondary to this. His blood pressure appears to be stable.     Objective     /68 (BP Location: Right arm, Patient Position: Sitting)   Pulse 67   Ht 170.2 cm (67\")   Wt 78 kg (172 lb)   SpO2 97%   BMI 26.94 kg/m²       Constitutional:       Appearance: Healthy appearance. Well-developed.   Eyes:      General: No scleral icterus.  HENT:      Head: Normocephalic and atraumatic.   Neck:      Vascular: No carotid bruit or JVD. JVD normal.   Pulmonary:      Effort: Pulmonary effort is normal.      Breath sounds: Normal breath sounds.   Cardiovascular:      Normal rate. Regular rhythm.      Murmurs: There is no murmur.      No gallop.   Musculoskeletal:      Extremities: No clubbing present.Skin:     General: Skin is warm and dry. There is no cyanosis.   Neurological:      Mental Status: Alert.   Psychiatric:         Attention and Perception: Attention normal.         Behavior: Behavior normal.         Result Review :    Images of echocardiogram performed prior to visit reviewed.  " LVEF 48%.  Bioprosthetic aortic valve functions normally.    Lab Results   Component Value Date    GLUCOSE 98 10/08/2019    BUN 23 04/23/2020    CREATININE 1.04 04/23/2020    EGFRIFNONA 70 04/23/2020    EGFRIFAFRI 85 04/23/2020    BCR 22.1 04/23/2020    K 4.6 04/23/2020    CO2 30.3 (H) 04/23/2020    CALCIUM 9.7 04/23/2020    PROTENTOTREF 6.7 04/23/2020    ALBUMIN 4.40 04/23/2020    LABIL2 1.9 04/23/2020    AST 33 04/23/2020    ALT 35 04/23/2020     Lab Results   Component Value Date    WBC 6.13 04/23/2020    HGB 17.0 04/23/2020    HCT 50.8 04/23/2020    MCV 88.8 04/23/2020     04/23/2020     Lab Results   Component Value Date    CHLPL 152 04/23/2020    TRIG 91 04/23/2020    HDL 48 04/23/2020    LDL 86 04/23/2020     Lab Results   Component Value Date    HGBA1C 6.44 (H) 04/23/2020             Assessment     Problem List Items Addressed This Visit        Cardiology Problems    Coronary artery disease involving native coronary artery of native heart without angina pectoris - Primary    Overview     · Cardiac catheterization (03/06/2013): mild non obstructive CAD         Current Assessment & Plan     · No angina  · Continue aspirin and statin therapy         Relevant Medications    aspirin (RA Aspirin EC) 81 MG EC tablet    Essential hypertension (Chronic)    Overview     • Target blood pressure <130/80 mmHg         Current Assessment & Plan     · Well-controlled  · Continue lisinopril         Relevant Medications    lisinopril (PRINIVIL,ZESTRIL) 10 MG tablet    Hyperlipidemia LDL goal <70 (Chronic)    Overview     · Statin therapy indicated given presence of coronary artery disease         Current Assessment & Plan     · Lab work to be performed with Dr. De La Paz in the near future            Other    Status post aortic valve replacement with bioprosthetic valve (Chronic)    Overview     · Echocardiogram (08/08/2012): Moderate AS, KLAUDIA of 0.8 cm2.  · Echocardiogram (02/26/2013): LVEF 55%, severe AS with mean  gradient of 65 mmHg.   · Bioprosthetic aortic valve replacement by Dr. Stuart Griffin (03/26/2013): 25-mm Magna Ease with ascending aortoplasty. Left atrial appendage excluded  · Echo (6/15/18): LVEF 48%. Normal functioning bioprosthetic aortic valve. Trivial paravalvular leak.  · Echo (4/23/2021): Echo unchanged from 6/15/2018         Current Assessment & Plan     · Normal functioning bioprosthetic aortic valve on echo today  · Continue low-dose aspirin  · SBE prophylaxis prior to dental procedures  · Repeat echo in 2024           Relevant Medications    amoxicillin (AMOXIL) 500 MG capsule          Plan   • Continue present medical therapy  • Lab work to be performed at Dr. De La Paz office  • Repeat echo in 2024      Follow-up   Return in about 1 year (around 4/23/2022).        Baljinder Spangler MD, [facility]  4/23/2021     Scribed for Marshal Spangler IV, MD by Rayne Barnett.  04/23/21   16:51 EDT    I have personally performed the services described in this document as scribed by the above individual, and it is both accurate and complete.  Marshal Spangler IV, MD  4/27/2021  17:23 EDT

## 2021-04-23 NOTE — ASSESSMENT & PLAN NOTE
· Normal functioning bioprosthetic aortic valve on echo today  · Continue low-dose aspirin  · SBE prophylaxis prior to dental procedures  · Repeat echo in 2024

## 2021-04-26 LAB
BH CV ECHO MEAS - AO MAX PG (FULL): 20.6 MMHG
BH CV ECHO MEAS - AO MAX PG: 22.3 MMHG
BH CV ECHO MEAS - AO MEAN PG (FULL): 10 MMHG
BH CV ECHO MEAS - AO MEAN PG: 11 MMHG
BH CV ECHO MEAS - AO ROOT AREA (BSA CORRECTED): 2
BH CV ECHO MEAS - AO ROOT AREA: 10.8 CM^2
BH CV ECHO MEAS - AO ROOT DIAM: 3.7 CM
BH CV ECHO MEAS - AO V2 MAX: 236 CM/SEC
BH CV ECHO MEAS - AO V2 MEAN: 158 CM/SEC
BH CV ECHO MEAS - AO V2 VTI: 48.9 CM
BH CV ECHO MEAS - ASC AORTA: 3.6 CM
BH CV ECHO MEAS - AVA(I,A): 0.82 CM^2
BH CV ECHO MEAS - AVA(I,D): 0.82 CM^2
BH CV ECHO MEAS - AVA(V,A): 0.79 CM^2
BH CV ECHO MEAS - AVA(V,D): 0.79 CM^2
BH CV ECHO MEAS - BSA(HAYCOCK): 1.9 M^2
BH CV ECHO MEAS - BSA: 1.9 M^2
BH CV ECHO MEAS - BZI_BMI: 27.6 KILOGRAMS/M^2
BH CV ECHO MEAS - BZI_METRIC_HEIGHT: 167.6 CM
BH CV ECHO MEAS - BZI_METRIC_WEIGHT: 77.6 KG
BH CV ECHO MEAS - EDV(CUBED): 171.9 ML
BH CV ECHO MEAS - EDV(MOD-SP4): 130 ML
BH CV ECHO MEAS - EDV(TEICH): 151.2 ML
BH CV ECHO MEAS - EF(CUBED): 31.6 %
BH CV ECHO MEAS - EF(MOD-SP4): 39.2 %
BH CV ECHO MEAS - EF(TEICH): 25.4 %
BH CV ECHO MEAS - ESV(CUBED): 117.6 ML
BH CV ECHO MEAS - ESV(MOD-SP4): 79 ML
BH CV ECHO MEAS - ESV(TEICH): 112.8 ML
BH CV ECHO MEAS - FS: 11.9 %
BH CV ECHO MEAS - IVS/LVPW: 0.79
BH CV ECHO MEAS - IVSD: 0.76 CM
BH CV ECHO MEAS - LA DIMENSION: 4.2 CM
BH CV ECHO MEAS - LA/AO: 1.1
BH CV ECHO MEAS - LAD MAJOR: 4.7 CM
BH CV ECHO MEAS - LAT PEAK E' VEL: 11.8 CM/SEC
BH CV ECHO MEAS - LATERAL E/E' RATIO: 5.9
BH CV ECHO MEAS - LV DIASTOLIC VOL/BSA (35-75): 69.5 ML/M^2
BH CV ECHO MEAS - LV MASS(C)D: 177.8 GRAMS
BH CV ECHO MEAS - LV MASS(C)DI: 95 GRAMS/M^2
BH CV ECHO MEAS - LV MAX PG: 1.7 MMHG
BH CV ECHO MEAS - LV MEAN PG: 1 MMHG
BH CV ECHO MEAS - LV SYSTOLIC VOL/BSA (12-30): 42.2 ML/M^2
BH CV ECHO MEAS - LV V1 MAX: 65.4 CM/SEC
BH CV ECHO MEAS - LV V1 MEAN: 43.5 CM/SEC
BH CV ECHO MEAS - LV V1 VTI: 14.2 CM
BH CV ECHO MEAS - LVIDD: 5.6 CM
BH CV ECHO MEAS - LVIDS: 4.9 CM
BH CV ECHO MEAS - LVLD AP4: 8 CM
BH CV ECHO MEAS - LVLS AP4: 7.3 CM
BH CV ECHO MEAS - LVOT AREA (M): 2.8 CM^2
BH CV ECHO MEAS - LVOT AREA: 2.8 CM^2
BH CV ECHO MEAS - LVOT DIAM: 1.9 CM
BH CV ECHO MEAS - LVPWD: 0.96 CM
BH CV ECHO MEAS - MED PEAK E' VEL: 6.6 CM/SEC
BH CV ECHO MEAS - MEDIAL E/E' RATIO: 10.5
BH CV ECHO MEAS - MV A MAX VEL: 74.5 CM/SEC
BH CV ECHO MEAS - MV DEC TIME: 0.22 SEC
BH CV ECHO MEAS - MV E MAX VEL: 69.6 CM/SEC
BH CV ECHO MEAS - MV E/A: 0.93
BH CV ECHO MEAS - PA ACC SLOPE: 579 CM/SEC^2
BH CV ECHO MEAS - PA ACC TIME: 0.13 SEC
BH CV ECHO MEAS - PA PR(ACCEL): 19.6 MMHG
BH CV ECHO MEAS - PI END-D VEL: 108 CM/SEC
BH CV ECHO MEAS - RAP SYSTOLE: 3 MMHG
BH CV ECHO MEAS - RVSP: 24.3 MMHG
BH CV ECHO MEAS - SI(AO): 281 ML/M^2
BH CV ECHO MEAS - SI(CUBED): 29 ML/M^2
BH CV ECHO MEAS - SI(LVOT): 21.5 ML/M^2
BH CV ECHO MEAS - SI(MOD-SP4): 27.3 ML/M^2
BH CV ECHO MEAS - SI(TEICH): 20.5 ML/M^2
BH CV ECHO MEAS - SV(AO): 525.8 ML
BH CV ECHO MEAS - SV(CUBED): 54.2 ML
BH CV ECHO MEAS - SV(LVOT): 40.3 ML
BH CV ECHO MEAS - SV(MOD-SP4): 51 ML
BH CV ECHO MEAS - SV(TEICH): 38.3 ML
BH CV ECHO MEAS - TAPSE (>1.6): 2 CM
BH CV ECHO MEAS - TR MAX PG: 21.3 MMHG
BH CV ECHO MEAS - TR MAX VEL: 231 CM/SEC
BH CV ECHO MEASUREMENTS AVERAGE E/E' RATIO: 7.57
BH CV XLRA - RV BASE: 3.8 CM
BH CV XLRA - RV LENGTH: 5 CM
BH CV XLRA - RV MID: 3 CM
LEFT ATRIUM VOLUME INDEX: 20.3 ML/M^2
LEFT ATRIUM VOLUME: 38 ML
LV EF 2D ECHO EST: 45 %
MAXIMAL PREDICTED HEART RATE: 147 BPM
STRESS TARGET HR: 125 BPM

## 2021-05-03 ENCOUNTER — OFFICE VISIT (OUTPATIENT)
Dept: INTERNAL MEDICINE | Facility: CLINIC | Age: 76
End: 2021-05-03

## 2021-05-03 VITALS
HEIGHT: 67 IN | SYSTOLIC BLOOD PRESSURE: 138 MMHG | DIASTOLIC BLOOD PRESSURE: 80 MMHG | HEART RATE: 56 BPM | TEMPERATURE: 97.7 F | BODY MASS INDEX: 26.84 KG/M2 | WEIGHT: 171 LBS

## 2021-05-03 DIAGNOSIS — E11.9 TYPE 2 DIABETES MELLITUS WITHOUT COMPLICATION, WITHOUT LONG-TERM CURRENT USE OF INSULIN (HCC): ICD-10-CM

## 2021-05-03 DIAGNOSIS — E78.5 HYPERLIPIDEMIA LDL GOAL <70: Chronic | ICD-10-CM

## 2021-05-03 DIAGNOSIS — I10 ESSENTIAL HYPERTENSION: Chronic | ICD-10-CM

## 2021-05-03 DIAGNOSIS — Z95.3 STATUS POST AORTIC VALVE REPLACEMENT WITH BIOPROSTHETIC VALVE: Primary | Chronic | ICD-10-CM

## 2021-05-03 PROCEDURE — 99214 OFFICE O/P EST MOD 30 MIN: CPT | Performed by: INTERNAL MEDICINE

## 2021-05-03 NOTE — ASSESSMENT & PLAN NOTE
Aortic valve replacement March 2013 currently stable continue aspirin 81 simvastatin 10 lisinopril 10.  Last echo stable April 23, 2021

## 2021-05-03 NOTE — PATIENT INSTRUCTIONS
We searched for labs again found only a CBC and testosterone level obtained [facility]  We will asked the patient to return for fasting CMP lipid A1c and TSH   Continue all current therapy  Continue follow-up with cardiology  Continue exercise program  Return visit in 6 months for annual wellness visit or as needed

## 2021-05-03 NOTE — PROGRESS NOTES
[facility]     Christian Mcintosh  1945   [id number]      Patient Care Team:  Merrill De La Paz MD as PCP - General (Internal Medicine)  Marshal Spangler IV, MD as Cardiologist (Interventional Cardiology)  Silvio Ahn MD as Consulting Physician (Urology)    Chief Complaint::   Chief Complaint   Patient presents with   • Hypertension     follow up    HPI  Patient 75-year-old male with aortic valve replacement in 2015 with essential hypertension mixed hyperlipidemia and diabetes type 2 with GERD syndrome.  Patient states he overall feels well he recently had lab work for CBC and testosterone level that was ordered by the [facility] group  from which he is receiving testosterone every week as well as Arimidex.  Patient states he overall feels well denies headache or dizzy denies cough or wheeze denies chest pain or pressure denies nausea vomiting.            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 Padilla   • Benign prostatic hypertrophy w elevated PSA 01/03/2018    BX NO CANCER   • Bicuspid  aortic valve    • Cataract    • Coronary artery disease 03/06/2013   • Diverticulosis 2002   • Gastric ulcer 2002   • GERD (gastroesophageal reflux disease)    • HO-Ywyyqee-Qzqdg tear 07/23/2013   • H/O atrial flutter    • History of basal cell cancer 2007   • History of echocardiogram    • History of rotator cuff tear    • Hyperlipidemia    • Hypertension    • Osteoarthritis 2011   • PONV (postoperative nausea and vomiting)    • Seizure (CMS/HCC)    • Skin cancer     skin.  BASAL CELL SKIN CANCER LEFT CHEEK   • Status post shoulder surgery    • Tear of left rotator cuff    • Tinnitus    • Urinary retention     king       Past Surgical History:   Procedure Laterality Date   • BASAL CELL CARCINOMA EXCISION  2007   • BONE SPUR ARM     • CARDIAC ABLATION     • CARDIAC CATHETERIZATION  03/06/2013   • CARDIAC VALVE REPLACEMENT  03/26/2013    bioprosthetic aortic valve   • CARDIAC VALVE REPLACEMENT     • CARPAL TUNNEL RELEASE     • CATARACT EXTRACTION     • COLONOSCOPY     • COLONOSCOPY  08/29/2012   • CYST REMOVAL     • CYSTOSCOPY TRANSURETHRAL RESECTION OF PROSTATE N/A 1/3/2018    Procedure: PROSTATE ULTRASOUND AND BIOPSY, CYSTOSCOPY TRANSURETHRAL RESECTION OF PROSTATE GREENLIGHT WITH VAPORIZATION;  Surgeon: Silvio Ahn MD;  Location: [facility];  Service:    • ENDOSCOPY  2002    w/biopsy   • EYE SURGERY      bilateral cataract extraction   • KING CATHETER  11/01/2016   • HERNIA REPAIR  2010   • KNEE ARTHROSCOPY     • KNEE MENISCAL REPAIR Bilateral    • KNEE SURGERY Left 2011   • LASIK     • PROSTATE BIOPSY  01/03/2018   • ROTATOR CUFF REPAIR  10/26/2016   • SHOULDER SURGERY Left    • SKIN CANCER EXCISION  12/07/2016    left cheek   • SKIN CANCER EXCISION     • TURP / TRANSURETHRAL INCISION / DRAINAGE PROSTATE     • VASECTOMY         Family History   Problem Relation Age of Onset   • No Known Problems Mother    • Heart attack Father 60   • Coronary artery disease Father    • Heart disease Brother    • Diabetes  "Brother    • Diabetes Sister    • Other Son         -accident       Social History     Socioeconomic History   • Marital status:      Spouse name: Not on file   • Number of children: Not on file   • Years of education: Not on file   • Highest education level: Not on file   Tobacco Use   • Smoking status: Former Smoker     Packs/day: 2.00     Years: 23.00     Pack years: 46.00     Types: Cigarettes     Start date: 1963     Quit date:      Years since quittin.3   • Smokeless tobacco: Never Used   Substance and Sexual Activity   • Alcohol use: Yes     Alcohol/week: 1.0 standard drinks     Types: 1 Shots of liquor per week     Comment: occasional    • Drug use: No   • Sexual activity: Defer       Allergies   Allergen Reactions   • Shellfish-Derived Products Anaphylaxis       Review of Systems     HEENT: Denies headache or dizzy vision or hearing change  NECK: Denies dysphagia pain or stiffness  CHEST: Denies cough or wheeze is non-smoker denies recent lung infections  CARDIAC: Denies chest pain or pressure is hypertension and aortic valve replacement currently stable with recent echocardiogram 2021 with recent follow-up by cardiology  ABD: Denies nausea vomiting abdominal pain  : Denies dysuria frequency, complains of ED-sildenafil as beneficial  NEURO: Denies syncope concussion or neuropathy  PSYCH: Denies anxiety depression  EXTREM: Remote left shoulder surgery, severe reduced range of motion and loss of strength right shoulder and severe degenerative arthritis of the right knee.    Vital Signs  Vitals:    21 0859   BP: 138/80   BP Location: Left arm   Patient Position: Sitting   Cuff Size: Adult   Pulse: 56  Comment: irregular   Temp: 97.7 °F (36.5 °C)   Weight: 77.6 kg (171 lb)   Height: 170.2 cm (67\")   PainSc: 0-No pain     Body mass index is 26.78 kg/m².  Patient's Body mass index is 26.78 kg/m². BMI is within normal parameters. No follow-up required..     Advance Care " Planning   ACP discussion was held with the patient during this visit. Patient has an advance directive in EMR which is still valid.        Current Outpatient Medications:   •  amitriptyline (ELAVIL) 10 MG tablet, Take 1 tablet by mouth Daily., Disp: , Rfl: 0  •  amoxicillin (AMOXIL) 500 MG capsule, Take 4 capsules by mouth See Admin Instructions. 4 capsules 1 hour prior to procedure, Disp: 12 capsule, Rfl: 0  •  anastrozole (ARIMIDEX) 1 MG tablet, Take 1 mg by mouth 2 (Two) Times a Week., Disp: , Rfl:   •  aspirin (RA Aspirin EC) 81 MG EC tablet, Take 1 tablet by mouth Daily., Disp: 90 tablet, Rfl: 3  •  B Complex Vitamins (VITAMIN B COMPLEX) capsule capsule, Take 1 capsule by mouth Daily., Disp: , Rfl:   •  Cholecalciferol (VITAMIN D3) 5000 units capsule capsule, Take 5,000 Units by mouth Daily., Disp: , Rfl:   •  Coenzyme Q10 (CO Q 10 PO), Take 1 capsule by mouth Daily., Disp: , Rfl:   •  DHEA 25 MG capsule, Take 25 mg by mouth Daily., Disp: , Rfl:   •  Docusate Sodium (STOOL SOFTENER) 100 MG capsule, Take 1 tablet by mouth daily., Disp: , Rfl:   •  levETIRAcetam (KEPPRA) 500 MG tablet, TAKE 1/2 TABLET BY MOUTH EVERY DAY, Disp: 45 tablet, Rfl: 3  •  lisinopril (PRINIVIL,ZESTRIL) 10 MG tablet, Take 1 tablet by mouth Daily., Disp: 90 tablet, Rfl: 3  •  metFORMIN (GLUCOPHAGE) 500 MG tablet, TAKE 1 TABLET BY MOUTH DAILY, Disp: 90 tablet, Rfl: 3  •  Multiple Vitamins-Minerals (CENTRUM ADULTS PO), Take 1 tablet by mouth daily., Disp: , Rfl:   •  Omega-3 Fatty Acids (FISH OIL PO), Take 1 tablet by mouth Daily., Disp: , Rfl:   •  pantoprazole (PROTONIX) 40 MG EC tablet, TAKE 1 TABLET BY MOUTH DAILY, Disp: 90 tablet, Rfl: 3  •  sildenafil (REVATIO) 20 MG tablet, Take 1 tablet by mouth As Needed (as needed for ED). Take 3 as needed, Disp: 100 tablet, Rfl: 2  •  simvastatin (ZOCOR) 10 MG tablet, TAKE 1 TABLET BY MOUTH EVERY EVENING, Disp: 90 tablet, Rfl: 3  •  TESTOSTERONE CYPIONATE IJ, Inject 0.7 mg as directed 1 (One)  Time Per Week., Disp: , Rfl:   •  cyclobenzaprine (FLEXERIL) 5 MG tablet, Take 1/2 tablet twice daily for muscle spasm, Disp: 15 tablet, Rfl: 1    Physical Exam     ACE III MINI         HEENT: Pupils equal reactive ENT clear no asymmetry pharynx is clear  NECK: No masses bruit thyromegaly neck vein distention  CHEST: Clear without rales wheezes or rhonchi  CARDIAC: Regular rhythm without gallop or rub blood pressure and heart rate stable  ABD: Liver spleen normal good bowel sounds  : Deferred  NEURO: Intact  PSYCH: Normal  EXTREM: Remote left shoulder surgery, severe right shoulder reduced range of motion loss of strength right greater than left knee degenerative arthritic changes  Skin: Clear     Results Review:    Recent Results (from the past 672 hour(s))   Adult Transthoracic Echo Complete W/ Cont if Necessary Per Protocol    Collection Time: 04/23/21  5:07 PM   Result Value Ref Range    BSA 1.9 m^2    IVSd 0.76 cm    LVIDd 5.6 cm    LVIDs 4.9 cm    LVPWd 0.96 cm    IVS/LVPW 0.79     FS 11.9 %    EDV(Teich) 151.2 ml    ESV(Teich) 112.8 ml    EF(Teich) 25.4 %    EDV(cubed) 171.9 ml    ESV(cubed) 117.6 ml    EF(cubed) 31.6 %    LV mass(C)d 177.8 grams    LV mass(C)dI 95.0 grams/m^2    SV(Teich) 38.3 ml    SI(Teich) 20.5 ml/m^2    SV(cubed) 54.2 ml    SI(cubed) 29.0 ml/m^2    Ao root diam 3.7 cm    Ao root area 10.8 cm^2    LA dimension 4.2 cm    asc Aorta Diam 3.6 cm    LA/Ao 1.1     LVOT diam 1.9 cm    LVOT area 2.8 cm^2    LVOT area(traced) 2.8 cm^2    LAd major 4.7 cm    LVLd ap4 8.0 cm    EDV(MOD-sp4) 130.0 ml    LVLs ap4 7.3 cm    ESV(MOD-sp4) 79.0 ml    EF(MOD-sp4) 39.2 %    LA volume 38.0 ml    SV(MOD-sp4) 51.0 ml    SI(MOD-sp4) 27.3 ml/m^2    Ao root area (BSA corrected) 2.0     LV Martinez Vol (BSA corrected) 69.5 ml/m^2    LV Sys Vol (BSA corrected) 42.2 ml/m^2    LA Volume Index 20.3 ml/m^2    MV E max vasquez 69.6 cm/sec    MV A max vasquez 74.5 cm/sec    MV E/A 0.93     MV dec time 0.22 sec    Ao pk vasquez 236.0  cm/sec    Ao max PG 22.3 mmHg    Ao max PG (full) 20.6 mmHg    Ao V2 mean 158.0 cm/sec    Ao mean PG 11.0 mmHg    Ao mean PG (full) 10.0 mmHg    Ao V2 VTI 48.9 cm    KLAUDIA(I,A) 0.82 cm^2    KLAUDIA(I,D) 0.82 cm^2    KLAUDIA(V,A) 0.79 cm^2    KLAUDIA(V,D) 0.79 cm^2    LV V1 max PG 1.7 mmHg    LV V1 mean PG 1.0 mmHg    LV V1 max 65.4 cm/sec    LV V1 mean 43.5 cm/sec    LV V1 VTI 14.2 cm    SV(Ao) 525.8 ml    SI(Ao) 281.0 ml/m^2    SV(LVOT) 40.3 ml    SI(LVOT) 21.5 ml/m^2    PA acc slope 579.0 cm/sec^2    PA acc time 0.13 sec    PI end-d earnest 108.0 cm/sec    TR max earnest 231.0 cm/sec    TR max PG 21.3 mmHg    RVSP(TR) 24.3 mmHg    RAP systole 3.0 mmHg    PA pr(Accel) 19.6 mmHg    Lat E/e'  5.9     Med E/e' 10.5     Lat Peak E' Earnest 11.8 cm/sec    Med Peak E' Earnest 6.6 cm/sec     CV ECHO ADY - BZI_BMI 27.6 kilograms/m^2     CV ECHO ADY - BSA(HAYCOCK) 1.9 m^2     CV ECHO ADY - BZI_METRIC_WEIGHT 77.6 kg     CV ECHO ADY - BZI_METRIC_HEIGHT 167.6 cm    Avg E/e' ratio 7.57     RV Base 3.80 cm    RV Length 5.00 cm    RV Mid 3.00 cm    TAPSE (>1.6) 2.00 cm    Target HR (85%) 125 bpm    Max. Pred. HR (100%) 147 bpm    Echo EF Estimated 45 %     Procedures    Medication Review: Medications reviewed and noted    Patient wellness counseling  Exercise: Encouraged continued exercise program   Diet: Continue healthy cardiac diet  Smoking: Non-smoker  Alcohol: 1-2 alcohol drinks per evening  Screening: Patient will need to return for repeated fasting labs as apparently all labs of a CBC and testosterone level were obtained on his recent draw.  We will contact the patient and ask him to return for labs at his convenience.    Assessment/Plan:    Problem List Items Addressed This Visit        Cardiac and Vasculature    Status post aortic valve replacement with bioprosthetic valve - Primary (Chronic)    Overview     · Echocardiogram (08/08/2012): Moderate AS, KLAUDIA of 0.8 cm2.  · Echocardiogram (02/26/2013): LVEF 55%, severe AS with mean gradient  of 65 mmHg.   · Bioprosthetic aortic valve replacement by Dr. Stuart Griffin (03/26/2013): 25-mm Magna Ease with ascending aortoplasty. Left atrial appendage excluded  · Echo (6/15/18): LVEF 48%. Normal functioning bioprosthetic aortic valve. Trivial paravalvular leak.  · Echo (4/23/2021): Echo unchanged from 6/15/2018         Current Assessment & Plan     Aortic valve replacement March 2013 currently stable continue aspirin 81 simvastatin 10 lisinopril 10.  Last echo stable April 23, 2021         Relevant Medications    amoxicillin (AMOXIL) 500 MG capsule    Other Relevant Orders    Comprehensive Metabolic Panel    Hemoglobin A1c    Lipid Panel    TSH    Hyperlipidemia LDL goal <70 (Chronic)    Overview     · Statin therapy indicated given presence of coronary artery disease         Current Assessment & Plan       Mixed hyperlipidemia aortic valve replacement on simvastatin 10 mg daily continue therapy         Relevant Medications    simvastatin (ZOCOR) 10 MG tablet    Other Relevant Orders    Comprehensive Metabolic Panel    Hemoglobin A1c    Lipid Panel    TSH    Essential hypertension (Chronic)    Overview     • Target blood pressure <130/80 mmHg         Current Assessment & Plan       Current blood pressure 130/80 repeated 132/80 continue lisinopril 10.           Relevant Medications    lisinopril (PRINIVIL,ZESTRIL) 10 MG tablet    Other Relevant Orders    Comprehensive Metabolic Panel    Hemoglobin A1c    Lipid Panel    TSH       Endocrine and Metabolic    Type 2 diabetes mellitus without complication, without long-term current use of insulin (CMS/[facility])    Overview     History of diabetes type 2 on metformin 500 mg daily with last blood work in acceptable fasting blood sugar of 98 and A1c in October 2019         Current Assessment & Plan       Diabetes type 2 on Metformin 500 mg daily denies GI distress side effects continue therapy         Relevant Medications    metFORMIN (GLUCOPHAGE) 500 MG tablet    Other  Relevant Orders    Comprehensive Metabolic Panel    Hemoglobin A1c    Lipid Panel    TSH           Patient Instructions   We searched for labs again found only a CBC and testosterone level obtained by his [facility] group  We will asked the patient to return for fasting CMP lipid A1c and TSH   Continue all current therapy  Continue follow-up with cardiology  Continue exercise program  Return visit in 6 months for annual wellness visit or as needed       Plan of care reviewed with patient at the conclusion of today's visit. Education was provided regarding diagnosis, management, and any prescribed or recommended OTC medications.Patient verbalizes understanding of and agreement with management plan.         Merrill De La Paz MD      Note: Part of this note may be an electronic transcription/translation of spoken language to printed text using the Dragon Dictation system.

## 2021-05-04 ENCOUNTER — TELEPHONE (OUTPATIENT)
Dept: INTERNAL MEDICINE | Facility: CLINIC | Age: 76
End: 2021-05-04

## 2021-05-04 NOTE — TELEPHONE ENCOUNTER
Dr. De La Paz wants to have additional labs done on this patient.  The labs drawn last week do not include everything he wants done.  I left a voice mail message for patient asking him to return to the lab at his convenience to have fasting labs drawn.

## 2021-05-06 ENCOUNTER — TELEPHONE (OUTPATIENT)
Dept: INTERNAL MEDICINE | Facility: CLINIC | Age: 76
End: 2021-05-06

## 2021-05-06 LAB
ALBUMIN SERPL-MCNC: 4.7 G/DL (ref 3.5–5.2)
ALBUMIN/GLOB SERPL: 2.4 G/DL
ALP SERPL-CCNC: 79 U/L (ref 39–117)
ALT SERPL-CCNC: 25 U/L (ref 1–41)
AST SERPL-CCNC: 28 U/L (ref 1–40)
BILIRUB SERPL-MCNC: 0.7 MG/DL (ref 0–1.2)
BUN SERPL-MCNC: 15 MG/DL (ref 8–23)
BUN/CREAT SERPL: 12 (ref 7–25)
CALCIUM SERPL-MCNC: 10.2 MG/DL (ref 8.6–10.5)
CHLORIDE SERPL-SCNC: 104 MMOL/L (ref 98–107)
CHOLEST SERPL-MCNC: 159 MG/DL (ref 0–200)
CO2 SERPL-SCNC: 27 MMOL/L (ref 22–29)
CREAT SERPL-MCNC: 1.25 MG/DL (ref 0.76–1.27)
GLOBULIN SER CALC-MCNC: 2 GM/DL
GLUCOSE SERPL-MCNC: 99 MG/DL (ref 65–99)
HBA1C MFR BLD: 6.1 % (ref 4.8–5.6)
HDLC SERPL-MCNC: 45 MG/DL (ref 40–60)
LDLC SERPL CALC-MCNC: 95 MG/DL (ref 0–100)
POTASSIUM SERPL-SCNC: 5.6 MMOL/L (ref 3.5–5.2)
PROT SERPL-MCNC: 6.7 G/DL (ref 6–8.5)
SODIUM SERPL-SCNC: 139 MMOL/L (ref 136–145)
TRIGL SERPL-MCNC: 101 MG/DL (ref 0–150)
TSH SERPL DL<=0.005 MIU/L-ACNC: 1.49 UIU/ML (ref 0.27–4.2)
VLDLC SERPL CALC-MCNC: 19 MG/DL (ref 5–40)

## 2021-05-06 NOTE — TELEPHONE ENCOUNTER
Caller: JENNIFER     Relationship: PATIENT     Best call back number: [phone]     What test was performed: BLOOD WORK     When was the test performed: THIS MORNING 5-6-21    Where was the test performed: [facility]      Additional notes: PATIENT WANTED TO LET THE DOCTOR KNOW HE WENT THIS MORNING TO DO HIS LABS

## 2021-05-10 DIAGNOSIS — E87.5 HYPERKALEMIA: Primary | ICD-10-CM

## 2021-06-12 ENCOUNTER — NURSE TRIAGE (OUTPATIENT)
Dept: CALL CENTER | Facility: HOSPITAL | Age: 76
End: 2021-06-12

## 2021-06-13 NOTE — TELEPHONE ENCOUNTER
"    Reason for Disposition  • [1] Caller has URGENT question AND [2] triager unable to answer question    Additional Information  • Negative: Sounds like a life-threatening emergency to the triager  • Negative: Chest pain  • Negative: Difficulty breathing  • Negative: Acting confused (e.g., disoriented, slurred speech) or excessively sleepy  • Negative: Surgical incision symptoms and questions  • Negative: [1] Discomfort (pain, burning or stinging) when passing urine AND [2] male  • Negative: [1] Discomfort (pain, burning or stinging) when passing urine AND [2] female  • Negative: Constipation  • Negative: New or worsening leg (calf, thigh) pain  • Negative: New or worsening leg swelling  • Negative: Dizziness is severe, or persists > 24 hours after surgery  • Negative: Pain, redness, swelling, or pus at IV Site  • Negative: Symptoms arising from use of a urinary catheter (Harris or Coude)  • Negative: Cast problems or questions  • Negative: Medication question  • Negative: [1] Widespread rash AND [2] bright red, sunburn-like  • Negative: [1] SEVERE headache AND [2] after spinal (epidural) anesthesia  • Negative: [1] Vomiting AND [2] persists > 4 hours  • Negative: [1] Vomiting AND [2] abdomen looks much more swollen than usual  • Negative: [1] Drinking very little AND [2] dehydration suspected (e.g., no urine > 12 hours, very dry mouth, very lightheaded)  • Negative: Patient sounds very sick or weak to the triager  • Negative: Sounds like a serious complication to the triager  • Negative: Fever > 100.4 F (38.0 C)  • Negative: [1] SEVERE post-op pain (e.g., excruciating, pain scale 8-10) AND [2] not controlled with pain medications    Answer Assessment - Initial Assessment Questions  1. SYMPTOM: \"What's the main symptom you're concerned about?\" (e.g., pain, fever, vomiting)      Swelling and numbness    2. ONSET: \"When did swelling and numbness   start?\"      Ongoing but worse today.    3. SURGERY: \"What surgery was " "performed?\"      Stent for PV Disease    4. DATE of SURGERY: \"When was surgery performed?\"       Not sure.    5. ANESTHESIA: \" What type of anesthesia did you have?\" (e.g., general, spinal, epidural, local)      Unknown    6. PAIN: \"Is there any pain?\" If so, ask: \"How bad is it?\"  (Scale 1-10; or mild, moderate, severe)      No pain but numbness   7. FEVER: \"Do you have a fever?\" If so, ask: \"What is your temperature, how was it measured, and when did it start?\"      No   8. VOMITING: \"Is there any vomiting?\" If yes, ask: \"How many times?\"      No   9. BLEEDING: \"Is there any bleeding?\" If so, ask: \"How much?\" and \"Where?\"      No   10. OTHER SYMPTOMS: \"Do you have any other symptoms?\" (e.g., drainage from wound, painful urination, constipation)        no    Protocols used: POST-OP SYMPTOMS AND QUESTIONS-ADULT-AH      "

## 2021-06-24 ENCOUNTER — TELEPHONE (OUTPATIENT)
Dept: INTERNAL MEDICINE | Facility: CLINIC | Age: 76
End: 2021-06-24

## 2021-06-24 NOTE — TELEPHONE ENCOUNTER
PATIENT CALLED AND WANTED TO KNOW IF DR ORTIZ, HIS UROLOGIST HAS SENT OVER HIS RECENT LABS; HE SAID HE HAD POTASSIUM, PROSTATE LEVELS, AND TESTERONE LEVELS CHECKED; PLEASE CALL TO ADVISE    JENNIFER: [phone]

## 2021-06-24 NOTE — TELEPHONE ENCOUNTER
Message noted have not received any lab from Dr. Ahn office will call and see if they can send those values over

## 2021-06-24 NOTE — TELEPHONE ENCOUNTER
I left a voice mail with Dr. Ahn's office requesting a copy of the labs done 6/21/21 be faxed to us.

## 2021-06-28 NOTE — TELEPHONE ENCOUNTER
PT RETURNED CALL AND UNDERSTOOD THE VM AND HAS ANOTHER QUESTION FOR KAITLYNN.  CAN THE PT RETURN TO TAKING MULTIVITAMINS AND EATING BANANA'S?  PLEASE RETURN CALL TO PT.

## 2021-08-18 ENCOUNTER — LAB (OUTPATIENT)
Dept: LAB | Facility: HOSPITAL | Age: 76
End: 2021-08-18

## 2021-08-18 DIAGNOSIS — E87.5 HYPERKALEMIA: ICD-10-CM

## 2021-08-19 LAB
ALBUMIN SERPL-MCNC: 4.2 G/DL (ref 3.5–5.2)
ALBUMIN/GLOB SERPL: 2 G/DL
ALP SERPL-CCNC: 76 U/L (ref 39–117)
ALT SERPL-CCNC: 25 U/L (ref 1–41)
AST SERPL-CCNC: 30 U/L (ref 1–40)
BILIRUB SERPL-MCNC: 0.4 MG/DL (ref 0–1.2)
BUN SERPL-MCNC: 17 MG/DL (ref 8–23)
BUN/CREAT SERPL: 16.3 (ref 7–25)
CALCIUM SERPL-MCNC: 9.5 MG/DL (ref 8.6–10.5)
CHLORIDE SERPL-SCNC: 105 MMOL/L (ref 98–107)
CO2 SERPL-SCNC: 25.7 MMOL/L (ref 22–29)
CREAT SERPL-MCNC: 1.04 MG/DL (ref 0.76–1.27)
GLOBULIN SER CALC-MCNC: 2.1 GM/DL
GLUCOSE SERPL-MCNC: 72 MG/DL (ref 65–99)
POTASSIUM SERPL-SCNC: 4.5 MMOL/L (ref 3.5–5.2)
PROT SERPL-MCNC: 6.3 G/DL (ref 6–8.5)
SODIUM SERPL-SCNC: 140 MMOL/L (ref 136–145)

## 2021-10-11 ENCOUNTER — TELEPHONE (OUTPATIENT)
Dept: INTERNAL MEDICINE | Facility: CLINIC | Age: 76
End: 2021-10-11

## 2021-10-11 DIAGNOSIS — E11.9 TYPE 2 DIABETES MELLITUS WITHOUT COMPLICATION, WITHOUT LONG-TERM CURRENT USE OF INSULIN (HCC): ICD-10-CM

## 2021-10-11 DIAGNOSIS — Z12.5 PROSTATE CANCER SCREENING: ICD-10-CM

## 2021-10-11 DIAGNOSIS — I10 ESSENTIAL HYPERTENSION: Primary | ICD-10-CM

## 2021-10-11 DIAGNOSIS — E78.5 HYPERLIPIDEMIA LDL GOAL <70: ICD-10-CM

## 2021-10-11 RX ORDER — MECLIZINE HYDROCHLORIDE 25 MG/1
25 TABLET ORAL 3 TIMES DAILY PRN
Qty: 25 TABLET | Refills: 2 | Status: SHIPPED | OUTPATIENT
Start: 2021-10-11 | End: 2022-10-27 | Stop reason: SDUPTHER

## 2021-10-11 NOTE — TELEPHONE ENCOUNTER
Caller: Christian Mcintosh    Relationship: Self    Best call back number: [phone]     What medication are you requesting: UNSURE    What are your current symptoms: POSSIBLE VERTIGO, RINGING IN EARS, SPINNING, UNSTEADY, FEELS LIKE INNER EAR INFECTION. FEELS A SQUISHING IN EAR, NO PAIN, JUST FEELS WEIRD AND DIZZY AND ONLY OCCASIONAL    How long have you been experiencing symptoms: ABOUT 4-5 DAYS    Have you had these symptoms before:    [x] Yes  [] No    Have you been treated for these symptoms before:   [] Yes  [x] No    If a prescription is needed, what is your preferred pharmacy and phone number:    [id number] - [place], [place] - [address]  - [phone]  - [phone] FX        Additional notes: MORE RINGING THIS TIME, WIFE IS AN EX-NURSE AND SHE SAYS IT SOUNDS LIKE AN INNER EAR INFECTION. PLEASE CALL PATIENT IF APPOINTMENT IS NEEDED

## 2021-10-11 NOTE — TELEPHONE ENCOUNTER
Prescription for meclizine 25 mg 3 times daily is sent to the patient's drugstore please take for the next 3 days and then as needed and to notify us if not improved please inform patient

## 2021-10-18 ENCOUNTER — TELEPHONE (OUTPATIENT)
Dept: INTERNAL MEDICINE | Facility: CLINIC | Age: 76
End: 2021-10-18

## 2021-10-18 DIAGNOSIS — H83.03 LABYRINTHITIS OF BOTH EARS: Primary | ICD-10-CM

## 2021-10-18 NOTE — TELEPHONE ENCOUNTER
We will arrange physical therapy in [facility] for Epley maneuvers for labyrinthitis please inform patient consult has been placed I hope correctly

## 2021-10-18 NOTE — TELEPHONE ENCOUNTER
I scheduled appt at [facility]  in [place] for today at 4:00.  Patient notified.  Order faxed to [phone].  There were no openings at [facility] in [place] and patient is in agreement.

## 2021-10-18 NOTE — TELEPHONE ENCOUNTER
"I called patient.  He called on 10/11/21 c/o dizziness.  We gave meclizine to take TID x 3 days.  He did this.  States it may have helped \"a little\" but dizziness continues.  States the meclizine made him very sleepy.   His dizziness occurs mostly with position changes, especially when lying down in the bed at night.  Also occurs with certain head turns, or when bending over.  States his wife had the Epley maneuver and wonders if this would help him.    "

## 2021-11-08 ENCOUNTER — TELEPHONE (OUTPATIENT)
Dept: INTERNAL MEDICINE | Facility: CLINIC | Age: 76
End: 2021-11-08

## 2021-11-08 NOTE — TELEPHONE ENCOUNTER
Caller: Christian Mcintosh    Relationship: Self    Best call back number:[phone]    What orders are you requesting (i.e. lab or imaging): BLOOD WORK     In what timeframe would the patient need to come in: N/A    Where will you receive your lab/imaging services: N/A    Additional notes: PATIENT WOULD LIKE TO COME GET BLOOD WORK DONE BEFORE HIS APPOINTMENT     PLEASE ADVISE

## 2021-11-10 ENCOUNTER — OFFICE VISIT (OUTPATIENT)
Dept: INTERNAL MEDICINE | Facility: CLINIC | Age: 76
End: 2021-11-10

## 2021-11-10 ENCOUNTER — LAB (OUTPATIENT)
Dept: LAB | Facility: HOSPITAL | Age: 76
End: 2021-11-10

## 2021-11-10 VITALS
HEART RATE: 54 BPM | HEIGHT: 67 IN | SYSTOLIC BLOOD PRESSURE: 112 MMHG | BODY MASS INDEX: 27.62 KG/M2 | WEIGHT: 176 LBS | TEMPERATURE: 97.2 F | DIASTOLIC BLOOD PRESSURE: 78 MMHG

## 2021-11-10 DIAGNOSIS — M19.012 PRIMARY OSTEOARTHRITIS OF LEFT SHOULDER: ICD-10-CM

## 2021-11-10 DIAGNOSIS — Z12.5 PROSTATE CANCER SCREENING: ICD-10-CM

## 2021-11-10 DIAGNOSIS — Z00.00 MEDICARE ANNUAL WELLNESS VISIT, SUBSEQUENT: Primary | ICD-10-CM

## 2021-11-10 DIAGNOSIS — G40.409 GRAND MAL SEIZURE (HCC): ICD-10-CM

## 2021-11-10 DIAGNOSIS — I10 ESSENTIAL HYPERTENSION: Chronic | ICD-10-CM

## 2021-11-10 DIAGNOSIS — I44.0 FIRST DEGREE AV BLOCK: ICD-10-CM

## 2021-11-10 DIAGNOSIS — E78.5 HYPERLIPIDEMIA LDL GOAL <70: Chronic | ICD-10-CM

## 2021-11-10 DIAGNOSIS — M19.211 SECONDARY OSTEOARTHRITIS OF RIGHT SHOULDER DUE TO ROTATOR CUFF TEAR: ICD-10-CM

## 2021-11-10 DIAGNOSIS — M17.0 PRIMARY OSTEOARTHRITIS OF BOTH KNEES: ICD-10-CM

## 2021-11-10 DIAGNOSIS — M75.101 SECONDARY OSTEOARTHRITIS OF RIGHT SHOULDER DUE TO ROTATOR CUFF TEAR: ICD-10-CM

## 2021-11-10 DIAGNOSIS — E11.9 TYPE 2 DIABETES MELLITUS WITHOUT COMPLICATION, WITHOUT LONG-TERM CURRENT USE OF INSULIN (HCC): ICD-10-CM

## 2021-11-10 DIAGNOSIS — I10 ESSENTIAL HYPERTENSION: ICD-10-CM

## 2021-11-10 DIAGNOSIS — E78.5 HYPERLIPIDEMIA LDL GOAL <70: ICD-10-CM

## 2021-11-10 DIAGNOSIS — Z95.3 STATUS POST AORTIC VALVE REPLACEMENT WITH BIOPROSTHETIC VALVE: ICD-10-CM

## 2021-11-10 PROCEDURE — G0439 PPPS, SUBSEQ VISIT: HCPCS | Performed by: INTERNAL MEDICINE

## 2021-11-10 PROCEDURE — 96160 PT-FOCUSED HLTH RISK ASSMT: CPT | Performed by: INTERNAL MEDICINE

## 2021-11-10 PROCEDURE — 1126F AMNT PAIN NOTED NONE PRSNT: CPT | Performed by: INTERNAL MEDICINE

## 2021-11-10 PROCEDURE — 93000 ELECTROCARDIOGRAM COMPLETE: CPT | Performed by: INTERNAL MEDICINE

## 2021-11-10 PROCEDURE — 99213 OFFICE O/P EST LOW 20 MIN: CPT | Performed by: INTERNAL MEDICINE

## 2021-11-10 PROCEDURE — 1170F FXNL STATUS ASSESSED: CPT | Performed by: INTERNAL MEDICINE

## 2021-11-10 PROCEDURE — 1160F RVW MEDS BY RX/DR IN RCRD: CPT | Performed by: INTERNAL MEDICINE

## 2021-11-10 RX ORDER — AMOXICILLIN 500 MG/1
2000 CAPSULE ORAL SEE ADMIN INSTRUCTIONS
Qty: 12 CAPSULE | Refills: 5 | Status: SHIPPED | OUTPATIENT
Start: 2021-11-10 | End: 2023-01-31

## 2021-11-10 NOTE — ASSESSMENT & PLAN NOTE
Diabetes type 2 on Metformin 500 mg every morning denies gastrointestinal distress, fasting CMP and A1c are pending continue therapy

## 2021-11-10 NOTE — ASSESSMENT & PLAN NOTE
Patient has remote shoulder left arthroplasty overall doing well without pain discomfort or limited range of motion

## 2021-11-10 NOTE — ASSESSMENT & PLAN NOTE
Right shoulder cuff injury with discomfort pain and reduced range of motion and minor loss of strength is followed by Dr. Peraza

## 2021-11-10 NOTE — ASSESSMENT & PLAN NOTE
AVR March 2013 followed by Dr. 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

## 2021-11-10 NOTE — PROGRESS NOTES
QUICK REFERENCE INFORMATION:  The ABCs of the Annual Wellness Visit    Subsequent Medicare Wellness Visit    HEALTH RISK ASSESSMENT    1945    Recent Hospitalizations:  No hospitalization(s) within the last year..        Current Medical Providers:  Patient Care Team:  Merrill De La Paz MD as PCP - General (Internal Medicine)  Marshal Spangler IV, MD as Cardiologist (Interventional Cardiology)  Silvio Ahn MD as Consulting Physician (Urology)        Smoking Status:  Social History     Tobacco Use   Smoking Status Former Smoker   • Packs/day: 2.00   • Years: 23.00   • Pack years: 46.00   • Types: Cigarettes   • Start date: 1963   • Quit date:    • Years since quittin.8   Smokeless Tobacco Never Used       Alcohol Consumption:  Social History     Substance and Sexual Activity   Alcohol Use Yes   • Alcohol/week: 1.0 standard drink   • Types: 1 Shots of liquor per week    Comment: occasional        Depression Screen:   PHQ-2/PHQ-9 Depression Screening 11/10/2021   Little interest or pleasure in doing things 1   Feeling down, depressed, or hopeless 1   Trouble falling or staying asleep, or sleeping too much -   Feeling tired or having little energy -   Poor appetite or overeating -   Feeling bad about yourself - or that you are a failure or have let yourself or your family down -   Trouble concentrating on things, such as reading the newspaper or watching television -   Moving or speaking so slowly that other people could have noticed. Or the opposite - being so fidgety or restless that you have been moving around a lot more than usual -   Thoughts that you would be better off dead, or of hurting yourself in some way -   Total Score 2   If you checked off any problems, how difficult have these problems made it for you to do your work, take care of things at home, or get along with other people? -       Health Habits and Functional and Cognitive Screening:  Functional & Cognitive Status  11/10/2021   Do you have difficulty preparing food and eating? No   Do you have difficulty bathing yourself, getting dressed or grooming yourself? No   Do you have difficulty using the toilet? No   Do you have difficulty moving around from place to place? No   Do you have trouble with steps or getting out of a bed or a chair? No   Current Diet Well Balanced Diet   Dental Exam Up to date   Eye Exam Up to date   Exercise (times per week) 3 times per week   Current Exercises Include Cardiovascular Workout   Current Exercise Activities Include -   Do you need help using the phone?  No   Are you deaf or do you have serious difficulty hearing?  No   Do you need help with transportation? No   Do you need help shopping? No   Do you need help preparing meals?  No   Do you need help with housework?  No   Do you need help with laundry? No   Do you need help taking your medications? No   Do you need help managing money? No   Do you ever drive or ride in a car without wearing a seat belt? No   Have you felt unusual stress, anger or loneliness in the last month? No   Who do you live with? Alone   If you need help, do you have trouble finding someone available to you? No   Have you been bothered in the last four weeks by sexual problems? Yes   Do you have difficulty concentrating, remembering or making decisions? No       Fall Risk Screen:  STEADI Fall Risk Assessment was completed, and patient is at LOW risk for falls.Assessment completed on:11/10/2021    ACE III MINI        Does the patient have evidence of cognitive impairment? No    Aspirin use counseling: Taking ASA appropriately as indicated    Recent Lab Results:  CMP:  Lab Results   Component Value Date    BUN 17 08/18/2021    CREATININE 1.04 08/18/2021    EGFRIFNONA 70 08/18/2021    EGFRIFAFRI 85 08/18/2021    BCR 16.3 08/18/2021     08/18/2021    K 4.5 08/18/2021    CO2 25.7 08/18/2021    CALCIUM 9.5 08/18/2021    PROTENTOTREF 6.3 08/18/2021    ALBUMIN 4.20  08/18/2021    LABGLOBREF 2.1 08/18/2021    LABIL2 2.0 08/18/2021    BILITOT 0.4 08/18/2021    ALKPHOS 76 08/18/2021    AST 30 08/18/2021    ALT 25 08/18/2021     HbA1c:  Lab Results   Component Value Date    HGBA1C 6.10 (H) 05/06/2021    HGBA1C 6.44 (H) 04/23/2020     Microalbumin:  Lab Results   Component Value Date    MICROALBUR 11.1 04/23/2020     Lipid Panel  Lab Results   Component Value Date    CHOL 140 10/08/2019    TRIG 101 05/06/2021    HDL 45 05/06/2021    LDL 95 05/06/2021    AST 30 08/18/2021    ALT 25 08/18/2021       Visual Acuity:  No exam data present    Age-appropriate Screening Schedule:  Refer to the list below for future screening recommendations based on patient's age, sex and/or medical conditions. Orders for these recommended tests are listed in the plan section. The patient has been provided with a written plan.    Health Maintenance   Topic Date Due   • ZOSTER VACCINE (2 of 3) 06/05/2015   • DIABETIC FOOT EXAM  04/10/2020   • TDAP/TD VACCINES (2 - Td or Tdap) 01/10/2021   • URINE MICROALBUMIN  04/23/2021   • INFLUENZA VACCINE  08/01/2021   • DIABETIC EYE EXAM  10/05/2021   • HEMOGLOBIN A1C  11/06/2021   • LIPID PANEL  05/06/2022        Subjective   History of Present Illness  Patient 76-year-old male with aortic valve replacement, essential hypertension, diabetes type 2, mixed hyperlipidemia, complaining of arthritic discomfort primarily knees having had remote meniscus surgery left and right knee and right shoulder with Dr. Hernandez reduced range of motion with repeated arthroscopic surgery left shoulder overall doing well with recent episode of labyrinthitis requiring Epley maneuver for resolution he is remote bilateral cataracts has minor hearing loss does not wear hearing aids complains of knee soreness and right shoulder soreness he was recently scheduled dental appointment and dates his amoxicillin prophylaxis 50 g prior to procedure.  Denies dysphagia denies cough or wheeze denies chest  pain or pressure denies nausea vomiting patient does have a living well and is a past smoker does consume occasional alcohol he has had Covid vaccinations.    Christian Mcintosh is a 76 y.o. male who presents for a Subsequent Wellness Visit.    CHRONIC CONDITIONS    The following portions of the patient's history were reviewed and updated as appropriate: allergies, current medications, past family history, past medical history, past social history and past surgical history.    Outpatient Medications Prior to Visit   Medication Sig Dispense Refill   • amitriptyline (ELAVIL) 10 MG tablet Take 1 tablet by mouth Daily.  0   • aspirin (RA Aspirin EC) 81 MG EC tablet Take 1 tablet by mouth Daily. 90 tablet 3   • B Complex Vitamins (VITAMIN B COMPLEX) capsule capsule Take 1 capsule by mouth Daily.     • Cholecalciferol (VITAMIN D3) 5000 units capsule capsule Take 5,000 Units by mouth Daily.     • Coenzyme Q10 (CO Q 10 PO) Take 1 capsule by mouth Daily.     • cyclobenzaprine (FLEXERIL) 5 MG tablet Take 1/2 tablet twice daily for muscle spasm 15 tablet 1   • DHEA 25 MG capsule Take 25 mg by mouth Daily.     • Docusate Sodium (STOOL SOFTENER) 100 MG capsule Take 1 tablet by mouth daily.     • levETIRAcetam (KEPPRA) 500 MG tablet TAKE 1/2 TABLET BY MOUTH EVERY DAY 45 tablet 3   • lisinopril (PRINIVIL,ZESTRIL) 10 MG tablet Take 1 tablet by mouth Daily. 90 tablet 3   • metFORMIN (GLUCOPHAGE) 500 MG tablet TAKE 1 TABLET BY MOUTH DAILY 90 tablet 3   • Multiple Vitamins-Minerals (CENTRUM ADULTS PO) Take 1 tablet by mouth daily.     • Omega-3 Fatty Acids (FISH OIL PO) Take 1 tablet by mouth Daily.     • pantoprazole (PROTONIX) 40 MG EC tablet TAKE 1 TABLET BY MOUTH DAILY 90 tablet 3   • sildenafil (REVATIO) 20 MG tablet Take 1 tablet by mouth As Needed (as needed for ED). Take 3 as needed 100 tablet 2   • simvastatin (ZOCOR) 10 MG tablet TAKE 1 TABLET BY MOUTH EVERY EVENING 90 tablet 3   • amoxicillin (AMOXIL) 500 MG capsule Take 4  capsules by mouth See Admin Instructions. 4 capsules 1 hour prior to procedure 12 capsule 0   • anastrozole (ARIMIDEX) 1 MG tablet Take 1 mg by mouth 2 (Two) Times a Week.     • meclizine (ANTIVERT) 25 MG tablet Take 1 tablet by mouth 3 (Three) Times a Day As Needed for Dizziness. 25 tablet 2   • TESTOSTERONE CYPIONATE IJ Inject 0.7 mg as directed 1 (One) Time Per Week.       No facility-administered medications prior to visit.       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 Patient has an advance directive in EMR which is still valid.     Identification of Risk Factors:  Risk factors include: Cardiovascular risk.    Review of Systems   Constitutional: Negative.  Negative for activity change, appetite change, chills, diaphoresis, fatigue, fever and unexpected weight change.   HENT: Positive for tinnitus. Negative for ear pain, rhinorrhea, sinus pressure and voice change.         Recent labyrinthitis improved with Epley maneuver   Eyes: Positive for visual disturbance.        Remote cataract surgery   Respiratory: Negative.   Negative for cough, choking, shortness of breath, wheezing and stridor.    Cardiovascular: Negative.  Negative for chest pain, palpitations and leg swelling.   Gastrointestinal: Negative.  Negative for abdominal pain, blood in stool, constipation, diarrhea, nausea and rectal pain.   Endocrine: Negative.  Negative for cold intolerance, heat intolerance, polydipsia, polyphagia and polyuria.   Genitourinary: Negative.  Negative for dysuria and frequency.   Musculoskeletal: Positive for arthralgias, joint swelling and neck pain. Negative for back pain, gait problem, myalgias and neck stiffness.   Skin: Negative.    Allergic/Immunologic: Negative.    Neurological: Positive for dizziness. Negative for tremors, seizures, syncope, facial asymmetry, speech difficulty, weakness, numbness and headaches.   Hematological: Negative.  Negative for adenopathy. Does not bruise/bleed easily.   Psychiatric/Behavioral: Negative.  The patient is not nervous/anxious.        Compared to one year ago, the patient feels his physical health is the same.  Compared to one year ago, the patient feels his mental health is the same.    Objective     Physical Exam  Vitals and nursing note reviewed.   Constitutional:       General: He is not in acute distress.     Appearance: Normal appearance. He is normal weight. He is not ill-appearing, toxic-appearing or diaphoretic.   HENT:      Head: Normocephalic and atraumatic.      Right Ear: Ear canal normal.      Left Ear: Ear canal normal.      Nose: Nose normal.      Mouth/Throat:      Mouth: Mucous membranes are moist.      Pharynx: Oropharynx is clear.   Eyes:      Extraocular Movements: Extraocular movements intact.      Conjunctiva/sclera: Conjunctivae normal.      Pupils: Pupils are equal, round, and reactive to light.   Neck:      Vascular: No carotid bruit.   Cardiovascular:      Rate and Rhythm: Normal rate and regular rhythm.      Pulses: Normal pulses.      Heart sounds: Normal heart sounds.    Pulmonary:      Effort: Pulmonary effort is normal.      Breath sounds: Normal breath sounds. No stridor. No wheezing or rales.   Abdominal:      General: Abdomen is flat. Bowel sounds are normal.      Palpations: Abdomen is soft. There is no mass.      Tenderness: There is no abdominal tenderness. There is no guarding.      Hernia: No hernia is present.   Musculoskeletal:         General: Tenderness present. No swelling, deformity or signs of injury.      Cervical back: Normal range of motion and neck supple. No rigidity or tenderness.      Right lower leg: Edema present.      Comments: Reduced range of motion right shoulder remote left shoulder arthroplasty  Prior lateral knee arthroplasty for meniscal tear   Lymphadenopathy:      Cervical: No cervical adenopathy.   Skin:     General: Skin is warm and dry.      Capillary Refill: Capillary refill takes less than 2 seconds.   Neurological:      General: No focal deficit present.      Mental Status: He is alert and oriented to person, place, and time. Mental status is at baseline.      Sensory: No sensory deficit.      Motor: No weakness.      Gait: Gait normal.   Psychiatric:         Mood and Affect: Mood normal.         Behavior: Behavior normal.         Thought Content: Thought content normal.         Judgment: Judgment normal.            ECG 12 Lead    Date/Time: 11/10/2021 5:52 PM  Performed by: Merrill De La Paz MD  Authorized by: Merrill De La Paz MD   Comparison: compared with previous ECG from 11/5/2020  Similar to previous ECG  Comparison to previous ECG: An EKG shows sinus bradycardia with first-degree A-V block of 0.244, and incomplete bundle branch block with left axis similar to EKG of November 5, 2020  Rhythm: sinus rhythm and sinus bradycardia  Rate: bradycardic  Conduction: non-specific intraventricular conduction delay  QRS axis: left    Clinical impression: abnormal EKG  Comments: EKG shows sinus bradycardia heart rate of 54 with first-degree AV block  "of 0.244 with incomplete bundle branch block similar to EKG of November 5, 2020         Patient Wellness Counseling:   Plan of care reviewed with patient at the conclusion of today's visit. Education was provided in regards to diagnosis, diet and exercise, prostate cancer screening discussed including benefit of early detection, potential need for follow-up, and harms associated with additional management. Patient agrees to screening.    Nutrition, physical activity, healthy weight,ways to reduce stress, adequate sleep, injury prevention, misuse of tobacco, alcohol and drugs, sexual behavior and STD's, dental health, mental health, and immunizations.    Plan of care reviewed with patient at the conclusion of today's visit. Education was provided regarding diagnosis, management and any prescribed or recommended OTC medications.  Patient verbalizes understanding of and agreement with management plan.        Vitals:    11/10/21 0903   BP: 112/78   BP Location: Left arm   Patient Position: Sitting   Cuff Size: Adult   Pulse: 54   Temp: 97.2 °F (36.2 °C)   Weight: 79.8 kg (176 lb)   Height: 170.2 cm (67\")   PainSc: 0-No pain       Patient's Body mass index is 27.57 kg/m². indicating that he is overweight (BMI 25-29.9). Obesity-related health conditions include the following: hypertension, diabetes mellitus and dyslipidemias. Obesity is unchanged. BMI is is above average; BMI management plan is completed. We discussed low calorie, low carb based diet program, portion control and increasing exercise..      Assessment/Plan   Problem List Items Addressed This Visit        Cardiac and Vasculature    Status post aortic valve replacement with bioprosthetic valve (Chronic)    Overview     · Echocardiogram (08/08/2012): Moderate AS, KLAUDIA of 0.8 cm2.  · Echocardiogram (02/26/2013): LVEF 55%, severe AS with mean gradient of 65 mmHg.   · Bioprosthetic aortic valve replacement by Dr. Stuart Girffin (03/26/2013): 25-mm Magna Ease with " ascending aortoplasty. Left atrial appendage excluded  · Echo (6/15/18): LVEF 48%. Normal functioning bioprosthetic aortic valve. Trivial paravalvular leak.  · Echo (4/23/2021): Echo unchanged from 6/15/2018         Current Assessment & Plan     AVR March 2013 followed by Dr. Baljinder Spangler and currently stable without pain discomfort on aspirin 81 daily simvastatin 10 mg daily lisinopril 10 mg daily and amoxicillin 2 g prior to dental procedure  Fasting labs are pending  Bradycardia with first-degree AV block 0.244 and incomplete right bundle branch block  Continue therapy         Relevant Medications    amoxicillin (AMOXIL) 500 MG capsule    Hyperlipidemia LDL goal <70 (Chronic)    Overview     · Statin therapy indicated given presence of coronary artery disease         Current Assessment & Plan       Mixed hyperlipidemia on simvastatin 10 mg daily denies premature therapy continue therapy fasting CMP and lipid are pending         Relevant Medications    simvastatin (ZOCOR) 10 MG tablet    Essential hypertension (Chronic)    Overview     • Target blood pressure <130/80 mmHg         Current Assessment & Plan       Essential hypertension on lisinopril 10 mg daily with current blood pressure 112/78 continue therapy         Relevant Medications    lisinopril (PRINIVIL,ZESTRIL) 10 MG tablet    First degree AV block    Overview     Current EKG shows sinus bradycardia with first-degree AV block 0.244 and incomplete right bundle similar to EKG of November 5, 2020 with a RI interval of 0.221 with left axis asymptomatic         Current Assessment & Plan     Current EKG shows sinus bradycardia with first-degree AV block of 0.244 asymptomatic         Relevant Orders    ECG 12 Lead       Endocrine and Metabolic    Type 2 diabetes mellitus without complication, without long-term current use of insulin (HCC)    Overview     History of diabetes type 2 on metformin 500 mg daily with last blood work in acceptable fasting blood  sugar of 98 and A1c in October 2019         Current Assessment & Plan       Diabetes type 2 on Metformin 500 mg every morning denies gastrointestinal distress, fasting CMP and A1c are pending continue therapy         Relevant Medications    metFORMIN (GLUCOPHAGE) 500 MG tablet       Health Encounters    Medicare annual wellness visit, subsequent - Primary       Musculoskeletal and Injuries    Primary osteoarthritis of left shoulder    Overview     Left shoulder remote arthroplasty         Current Assessment & Plan     Patient has remote shoulder left arthroplasty overall doing well without pain discomfort or limited range of motion         Primary osteoarthritis of both knees    Overview     Degenerative arthritis disease primarily of the shoulders and knees with previous injections of Synvisc in both knees currently symptomatic soreness in the right knee         Current Assessment & Plan     Remote meniscal surgery left and right knee overall doing well continue walking activities and as needed analgesic         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 Peraza         Current Assessment & Plan     Right shoulder cuff injury with discomfort pain and reduced range of motion and minor loss of strength is followed by Dr. 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         Current Assessment & Plan     Remote seizure 2015 stable on Keppra 500 mg, half tablet every evening, continue therapy             Patient Self-Management and Personalized Health Advice  The patient has been provided with  information about: prevention of cardiac or vascular disease and preventive services including:   · Annual Wellness Visit ([facility]).    Outpatient Encounter Medications as of 11/10/2021   Medication Sig Dispense Refill   • amitriptyline (ELAVIL) 10 MG tablet Take 1 tablet by mouth Daily.  0   • amoxicillin (AMOXIL) 500 MG capsule Take 4 capsules by mouth See Admin Instructions. 4 capsules 1 hour prior to procedure 12 capsule 5   • aspirin (RA Aspirin EC) 81 MG EC tablet Take 1 tablet by mouth Daily. 90 tablet 3   • B Complex Vitamins (VITAMIN B COMPLEX) capsule capsule Take 1 capsule by mouth Daily.     • Cholecalciferol (VITAMIN D3) 5000 units capsule capsule Take 5,000 Units by mouth Daily.     • Coenzyme Q10 (CO Q 10 PO) Take 1 capsule by mouth Daily.     • cyclobenzaprine (FLEXERIL) 5 MG tablet Take 1/2 tablet twice daily for muscle spasm 15 tablet 1   • DHEA 25 MG capsule Take 25 mg by mouth Daily.     • Docusate Sodium (STOOL SOFTENER) 100 MG capsule Take 1 tablet by mouth daily.     • levETIRAcetam (KEPPRA) 500 MG tablet TAKE 1/2 TABLET BY MOUTH EVERY DAY 45 tablet 3   • lisinopril (PRINIVIL,ZESTRIL) 10 MG tablet Take 1 tablet by mouth Daily. 90 tablet 3   • metFORMIN (GLUCOPHAGE) 500 MG tablet TAKE 1 TABLET BY MOUTH DAILY 90 tablet 3   • Multiple Vitamins-Minerals (CENTRUM ADULTS PO) Take 1 tablet by mouth daily.     • Omega-3 Fatty Acids (FISH OIL PO) Take 1 tablet by mouth Daily.     • pantoprazole (PROTONIX) 40 MG EC tablet TAKE 1 TABLET BY MOUTH DAILY 90 tablet 3   • sildenafil (REVATIO) 20 MG tablet Take 1 tablet by mouth As Needed (as needed for ED). Take 3 as needed 100 tablet 2   • simvastatin (ZOCOR) 10 MG tablet TAKE 1 TABLET BY MOUTH EVERY EVENING 90 tablet 3   • [DISCONTINUED] amoxicillin (AMOXIL) 500 MG capsule Take 4 capsules by mouth See Admin Instructions. 4 capsules 1 hour prior to procedure 12 capsule 0   • anastrozole (ARIMIDEX) 1 MG tablet Take 1 mg by mouth 2 (Two) Times a Week.     •  meclizine (ANTIVERT) 25 MG tablet Take 1 tablet by mouth 3 (Three) Times a Day As Needed for Dizziness. 25 tablet 2   • TESTOSTERONE CYPIONATE IJ Inject 0.7 mg as directed 1 (One) Time Per Week.       No facility-administered encounter medications on file as of 11/10/2021.       Reviewed use of high risk medication in the elderly: yes  Reviewed for potential of harmful drug interactions in the elderly: yes    Follow Up:  Return in about 6 months (around 5/10/2022) for Recheck.     Patient Instructions   Fasting lab pending from this morning  EKG discussed with sinus bradycardia first-degree AV block and incomplete bundle branch block unchanged from previous  Continue all current therapy  Continue walking exercise and healthy cardiac diet  Prescription given for amoxicillin 2 g prior to dental procedures  Return visit as needed or in 6 months      An After Visit Summary and PPPS with all of these plans were given to the patient.

## 2021-11-10 NOTE — ASSESSMENT & PLAN NOTE
Essential hypertension on lisinopril 10 mg daily with current blood pressure 112/78 continue therapy

## 2021-11-10 NOTE — PATIENT INSTRUCTIONS
Fasting lab pending from this morning  EKG discussed with sinus bradycardia first-degree AV block and incomplete bundle branch block unchanged from previous  Continue all current therapy  Continue walking exercise and healthy cardiac diet  Prescription given for amoxicillin 2 g prior to dental procedures  Return visit as needed or in 6 months

## 2021-11-10 NOTE — ASSESSMENT & PLAN NOTE
Remote meniscal surgery left and right knee overall doing well continue walking activities and as needed analgesic

## 2021-11-10 NOTE — ASSESSMENT & PLAN NOTE
Mixed hyperlipidemia on simvastatin 10 mg daily denies premature therapy continue therapy fasting CMP and lipid are pending

## 2021-11-11 LAB
ALBUMIN SERPL-MCNC: 4.7 G/DL (ref 3.5–5.2)
ALBUMIN/CREAT UR: 4 MG/G CREAT (ref 0–29)
ALBUMIN/GLOB SERPL: 2.5 G/DL
ALP SERPL-CCNC: 88 U/L (ref 39–117)
ALT SERPL-CCNC: 27 U/L (ref 1–41)
AST SERPL-CCNC: 29 U/L (ref 1–40)
BASOPHILS # BLD AUTO: 0.11 10*3/MM3 (ref 0–0.2)
BASOPHILS NFR BLD AUTO: 1.7 % (ref 0–1.5)
BILIRUB SERPL-MCNC: 0.5 MG/DL (ref 0–1.2)
BUN SERPL-MCNC: 13 MG/DL (ref 8–23)
BUN/CREAT SERPL: 12.4 (ref 7–25)
CALCIUM SERPL-MCNC: 10 MG/DL (ref 8.6–10.5)
CHLORIDE SERPL-SCNC: 103 MMOL/L (ref 98–107)
CHOLEST SERPL-MCNC: 188 MG/DL (ref 0–200)
CO2 SERPL-SCNC: 30.3 MMOL/L (ref 22–29)
CREAT SERPL-MCNC: 1.05 MG/DL (ref 0.76–1.27)
CREAT UR-MCNC: 81.8 MG/DL
EOSINOPHIL # BLD AUTO: 0.68 10*3/MM3 (ref 0–0.4)
EOSINOPHIL NFR BLD AUTO: 10.4 % (ref 0.3–6.2)
ERYTHROCYTE [DISTWIDTH] IN BLOOD BY AUTOMATED COUNT: 12.6 % (ref 12.3–15.4)
GLOBULIN SER CALC-MCNC: 1.9 GM/DL
GLUCOSE SERPL-MCNC: 114 MG/DL (ref 65–99)
HBA1C MFR BLD: 5.8 % (ref 4.8–5.6)
HCT VFR BLD AUTO: 43.8 % (ref 37.5–51)
HDLC SERPL-MCNC: 43 MG/DL (ref 40–60)
HGB BLD-MCNC: 14.2 G/DL (ref 13–17.7)
IMM GRANULOCYTES # BLD AUTO: 0.02 10*3/MM3 (ref 0–0.05)
IMM GRANULOCYTES NFR BLD AUTO: 0.3 % (ref 0–0.5)
LDLC SERPL CALC-MCNC: 112 MG/DL (ref 0–100)
LYMPHOCYTES # BLD AUTO: 2.41 10*3/MM3 (ref 0.7–3.1)
LYMPHOCYTES NFR BLD AUTO: 36.9 % (ref 19.6–45.3)
MCH RBC QN AUTO: 30 PG (ref 26.6–33)
MCHC RBC AUTO-ENTMCNC: 32.4 G/DL (ref 31.5–35.7)
MCV RBC AUTO: 92.6 FL (ref 79–97)
MICROALBUMIN UR-MCNC: 3.5 UG/ML
MONOCYTES # BLD AUTO: 0.56 10*3/MM3 (ref 0.1–0.9)
MONOCYTES NFR BLD AUTO: 8.6 % (ref 5–12)
NEUTROPHILS # BLD AUTO: 2.75 10*3/MM3 (ref 1.7–7)
NEUTROPHILS NFR BLD AUTO: 42.1 % (ref 42.7–76)
NRBC BLD AUTO-RTO: 0 /100 WBC (ref 0–0.2)
PLATELET # BLD AUTO: 184 10*3/MM3 (ref 140–450)
POTASSIUM SERPL-SCNC: 4.8 MMOL/L (ref 3.5–5.2)
PROT SERPL-MCNC: 6.6 G/DL (ref 6–8.5)
RBC # BLD AUTO: 4.73 10*6/MM3 (ref 4.14–5.8)
SODIUM SERPL-SCNC: 139 MMOL/L (ref 136–145)
TRIGL SERPL-MCNC: 189 MG/DL (ref 0–150)
TSH SERPL DL<=0.005 MIU/L-ACNC: 2.18 UIU/ML (ref 0.27–4.2)
VLDLC SERPL CALC-MCNC: 33 MG/DL (ref 5–40)
WBC # BLD AUTO: 6.53 10*3/MM3 (ref 3.4–10.8)

## 2021-11-29 ENCOUNTER — TELEPHONE (OUTPATIENT)
Dept: INTERNAL MEDICINE | Facility: CLINIC | Age: 76
End: 2021-11-29

## 2021-11-29 NOTE — TELEPHONE ENCOUNTER
Caller: Christian Mcintosh    Relationship to patient: Self    Best call back number: [phone]    Patient is needing: PATIENT REQUESTING A CALL FROM [facility] REGARDING A LETTER HE RECEIVED IN THE MAIL

## 2021-11-29 NOTE — TELEPHONE ENCOUNTER
Patient received a letter from Dr. Mueller that it was time for his colonoscopy.  He wants to make sure [facility] agrees. Last was 10/21/15

## 2021-11-30 NOTE — TELEPHONE ENCOUNTER
When I spoke with patient yesterday, he stated he thought he had a cologuard about a year ago.  I cannot find it in the chart, not can it be found on the cologuard website.  Should patient proceed with colonoscopy?

## 2022-01-11 DIAGNOSIS — E78.5 HYPERLIPIDEMIA LDL GOAL <70: ICD-10-CM

## 2022-01-11 RX ORDER — SIMVASTATIN 10 MG
10 TABLET ORAL EVERY EVENING
Qty: 90 TABLET | Refills: 3 | Status: SHIPPED | OUTPATIENT
Start: 2022-01-11 | End: 2022-04-27

## 2022-01-17 PROCEDURE — U0004 COV-19 TEST NON-CDC HGH THRU: HCPCS | Performed by: PERSONAL EMERGENCY RESPONSE ATTENDANT

## 2022-01-19 ENCOUNTER — TELEPHONE (OUTPATIENT)
Dept: INTERNAL MEDICINE | Facility: CLINIC | Age: 77
End: 2022-01-19

## 2022-01-19 NOTE — TELEPHONE ENCOUNTER
Caller: Christian Mcintosh    Relationship to patient: Self    Best call back number: [phone]    Date of exposure: N/A    Date of positive COVID19 test: 01-18-22    Date if possible COVID19 exposure: N/A    COVID19 symptoms: N/A    Date of initial quarantine: N/A    Additional information or concerns: PATIENT STATED THAT HE WENT TO URGENT CARE TO GET TESTED FOR COVID AND RESULTS CAME BACK POSITIVE LAST NIGHT AND TO GET IN TOUCH WITH PROVIDER     PLEASE ADVISE    What is the patients preferred pharmacy:[facility] #[id number]

## 2022-01-19 NOTE — TELEPHONE ENCOUNTER
COVID symptoms noted suggest ibuprofen Advil 200 mg 2 or 3 times per day and over-the-counter Delsym DM cough medicine if the patient becomes short of breath or has other significant symptoms he would need to be seen in the emergency room at [facility] please inform patient

## 2022-01-19 NOTE — TELEPHONE ENCOUNTER
"I called patient.  He was evaluated at [facility] on 1/17/22.  Symptoms include cough, body aches, and very sore throat.  He states he believes he feels a little better today.  He's not really taking anything OTC - \"a couple of ibuprofen\".  Symptoms started 1/16/22 evening  "

## 2022-04-11 RX ORDER — LEVETIRACETAM 500 MG/1
TABLET ORAL
Qty: 45 TABLET | Refills: 3 | Status: SHIPPED | OUTPATIENT
Start: 2022-04-11 | End: 2023-04-06

## 2022-04-11 RX ORDER — PANTOPRAZOLE SODIUM 40 MG/1
40 TABLET, DELAYED RELEASE ORAL DAILY
Qty: 90 TABLET | Refills: 3 | Status: SHIPPED | OUTPATIENT
Start: 2022-04-11 | End: 2023-04-06

## 2022-04-11 NOTE — TELEPHONE ENCOUNTER
Rx Refill Note  Requested Prescriptions     Pending Prescriptions Disp Refills   • metFORMIN (GLUCOPHAGE) 500 MG tablet [Pharmacy Med Name: METFORMIN 500MG TABLETS] 90 tablet 3     Sig: TAKE 1 TABLET BY MOUTH DAILY   • levETIRAcetam (KEPPRA) 500 MG tablet [Pharmacy Med Name: LEVETIRACETAM 500MG TABLETS] 45 tablet 3     Sig: TAKE 1/2 TABLET BY MOUTH EVERY DAY   • pantoprazole (PROTONIX) 40 MG EC tablet [Pharmacy Med Name: PANTOPRAZOLE 40MG TABLETS] 90 tablet 3     Sig: TAKE 1 TABLET BY MOUTH DAILY      Last office visit with prescribing clinician: 11/10/2021      Next office visit with prescribing clinician: 5/10/2022            Maryam Babcock RN  04/11/22, 09:25 EDT

## 2022-04-20 ENCOUNTER — TELEPHONE (OUTPATIENT)
Dept: INTERNAL MEDICINE | Facility: CLINIC | Age: 77
End: 2022-04-20

## 2022-04-20 NOTE — TELEPHONE ENCOUNTER
Caller: Christian Mcintosh    Relationship: Self    Best call back number: [phone]    What is the best time to reach you:ANYTIME    Who are you requesting to speak with (clinical staff, provider,  specific staff member):MA    Do you know the name of the person who called    What was the call regarding: PATIENT CALLED IN TO PROVIDE AN UPDATE FROM ATTENDING THE ER ON 04/18/22 PATIENT STATED HE WAS GIVEN A STEROID SHOT AND ADVISED TO GET SOME OTC MEDICATION BUT IT IS NOT HELPING AND HE WOULD LIKE TO KNOW IF THERE IS SOMETHING ELSE THAT CAN BE PRESCRIBED    Do you require a callback:YES

## 2022-04-20 NOTE — TELEPHONE ENCOUNTER
See below.  I called patient. He was evaluated at [facility] on 4/18/22 for allergic rhinitis.  Was given a steroid shot.  He's taking OTC sudafed.  States he's not much better.  C/o head congestion - worse at night - and nasal drainage, with slight cough, sneezing, and watery eyes.  He has some Nyquil and Dayquil at home but doesn't take it regularly.  Please advise if there's anything else he can take.

## 2022-04-26 NOTE — PROGRESS NOTES
"[facility]      Identification: Christian Mcintosh is a 76 y.o. male who resides in [place], [place].     Reason for visit:  Bioprosthetic aortic valve  Mild CAD  CV risk factors      Subjective      Christian Mcintosh presents to [facility] for followup.    Mr. Mcintosh is doing well.  He denies any cardiovascular complaints of angina, heart failure, TIA or stroke symptoms.  No syncope.  He will be fully retiring and may.             Review of Systems   Constitutional: Negative for malaise/fatigue.   Eyes: Negative for vision loss in left eye and vision loss in right eye.   Cardiovascular: Negative for chest pain, dyspnea on exertion, near-syncope, orthopnea, palpitations, paroxysmal nocturnal dyspnea and syncope.   Musculoskeletal: Negative for myalgias.   Neurological: Negative for brief paralysis, excessive daytime sleepiness, focal weakness, numbness, paresthesias and weakness.   All other systems reviewed and are negative.      Objective     /72 (BP Location: Right arm, Patient Position: Sitting)   Pulse 65   Ht 170.2 cm (67\")   Wt 81.2 kg (179 lb)   SpO2 96%   BMI 28.04 kg/m²       Constitutional:       Appearance: Healthy appearance. Well-developed.   Eyes:      General: Lids are normal. No scleral icterus.  HENT:      Head: Normocephalic and atraumatic.   Neck:      Thyroid: No thyroid mass.      Vascular: No carotid bruit or JVD. JVD normal.   Pulmonary:      Effort: Pulmonary effort is normal.      Breath sounds: Normal breath sounds.   Cardiovascular:      Normal rate. Regular rhythm.      Murmurs: There is no murmur.      No gallop.   Musculoskeletal:      Extremities: No clubbing present.Skin:     General: Skin is warm and dry. There is no cyanosis.   Neurological:      General: No focal deficit present.      Mental Status: Alert.   Psychiatric:         Attention and Perception: Attention normal.         Behavior: Behavior normal. Behavior is cooperative.         Result " Review :    Lab Results   Component Value Date    GLUCOSE 114 (H) 11/10/2021    BUN 13 11/10/2021    CREATININE 1.05 11/10/2021    EGFRIFNONA 69 11/10/2021    EGFRIFAFRI 84 11/10/2021    BCR 12.4 11/10/2021    K 4.8 11/10/2021    CO2 30.3 (H) 11/10/2021    CALCIUM 10.0 11/10/2021    PROTENTOTREF 6.6 11/10/2021    ALBUMIN 4.70 11/10/2021    LABIL2 2.5 11/10/2021    AST 29 11/10/2021    ALT 27 11/10/2021     Lab Results   Component Value Date    WBC 6.53 11/10/2021    HGB 14.2 11/10/2021    HCT 43.8 11/10/2021    MCV 92.6 11/10/2021     11/10/2021     Lab Results   Component Value Date    CHLPL 188 11/10/2021    TRIG 189 (H) 11/10/2021    HDL 43 11/10/2021     (H) 11/10/2021     Lab Results   Component Value Date    HGBA1C 5.80 (H) 11/10/2021             Assessment     Problem List Items Addressed This Visit        Cardiology Problems    Coronary artery disease involving native coronary artery of native heart without angina pectoris    Overview     · Cardiac catheterization (03/06/2013): mild CAD           Current Assessment & Plan     · No angina  · Continue aspirin and statin therapy           Essential hypertension (Chronic)    Overview     • Target blood pressure <130/80 mmHg           Current Assessment & Plan     · Well-controlled           Hyperlipidemia LDL goal <70    Overview     · Statin therapy indicated given presence of coronary artery disease           Current Assessment & Plan     · Given presence of CAD, recommend intensifying statin therapy  · Change simvastatin to rosuvastatin 10 mg nightly  · CMP and lipids in 6 weeks           Relevant Medications    rosuvastatin (CRESTOR) 10 MG tablet    Other Relevant Orders    Comprehensive Metabolic Panel    Lipid Panel       Other    Status post aortic valve replacement with bioprosthetic valve - Primary (Chronic)    Overview     · Echocardiogram (08/08/2012): Moderate AS, KLAUDIA of 0.8 cm2.  · Echocardiogram (02/26/2013): LVEF 55%, severe AS with  mean gradient of 65 mmHg.   · Bioprosthetic aortic valve replacement by Dr. Stuart Griffin (03/26/2013): 25-mm Magna Ease with ascending aortoplasty. Left atrial appendage excluded  · Echo (6/15/18): LVEF 48%. Normal functioning bioprosthetic aortic valve. Trivial paravalvular leak.  · Echo (4/23/2021): Echo unchanged from 6/15/2018           Current Assessment & Plan     · Normal function on echo 1 year ago  · Continue aspirin, SBE prop  · Repeat echo 2024           Type 2 diabetes mellitus without complication, without long-term current use of insulin (HCC)    Relevant Medications    metFORMIN (GLUCOPHAGE) 500 MG tablet          Plan   • Discontinue simvastatin  • Start rosuvastatin 10 mg nightly  • CMP and lipids in 6 weeks  • SBE prophylaxis prior to dental procedures  • Repeat echo in 2024      Follow-up   Return in about 1 year (around 4/27/2023).        Baljinder Spangler MD, FACC, [facility]  4/27/2022

## 2022-04-27 ENCOUNTER — OFFICE VISIT (OUTPATIENT)
Dept: CARDIOLOGY | Facility: CLINIC | Age: 77
End: 2022-04-27

## 2022-04-27 VITALS
SYSTOLIC BLOOD PRESSURE: 130 MMHG | OXYGEN SATURATION: 96 % | WEIGHT: 179 LBS | BODY MASS INDEX: 28.09 KG/M2 | HEIGHT: 67 IN | DIASTOLIC BLOOD PRESSURE: 72 MMHG | HEART RATE: 65 BPM

## 2022-04-27 DIAGNOSIS — I10 ESSENTIAL HYPERTENSION: Chronic | ICD-10-CM

## 2022-04-27 DIAGNOSIS — E11.9 TYPE 2 DIABETES MELLITUS WITHOUT COMPLICATION, WITHOUT LONG-TERM CURRENT USE OF INSULIN: ICD-10-CM

## 2022-04-27 DIAGNOSIS — E78.5 HYPERLIPIDEMIA LDL GOAL <70: Chronic | ICD-10-CM

## 2022-04-27 DIAGNOSIS — I25.10 CORONARY ARTERY DISEASE INVOLVING NATIVE CORONARY ARTERY OF NATIVE HEART WITHOUT ANGINA PECTORIS: ICD-10-CM

## 2022-04-27 DIAGNOSIS — Z95.3 STATUS POST AORTIC VALVE REPLACEMENT WITH BIOPROSTHETIC VALVE: Primary | Chronic | ICD-10-CM

## 2022-04-27 PROCEDURE — 99214 OFFICE O/P EST MOD 30 MIN: CPT | Performed by: INTERNAL MEDICINE

## 2022-04-27 RX ORDER — ROSUVASTATIN CALCIUM 10 MG/1
10 TABLET, COATED ORAL NIGHTLY
Qty: 90 TABLET | Refills: 3 | Status: SHIPPED | OUTPATIENT
Start: 2022-04-27 | End: 2022-11-14

## 2022-04-27 NOTE — ASSESSMENT & PLAN NOTE
· Given presence of CAD, recommend intensifying statin therapy  · Change simvastatin to rosuvastatin 10 mg nightly  · CMP and lipids in 6 weeks

## 2022-05-10 ENCOUNTER — LAB (OUTPATIENT)
Dept: LAB | Facility: HOSPITAL | Age: 77
End: 2022-05-10

## 2022-05-10 ENCOUNTER — OFFICE VISIT (OUTPATIENT)
Dept: INTERNAL MEDICINE | Facility: CLINIC | Age: 77
End: 2022-05-10

## 2022-05-10 VITALS
HEART RATE: 53 BPM | TEMPERATURE: 97.8 F | BODY MASS INDEX: 27.94 KG/M2 | DIASTOLIC BLOOD PRESSURE: 62 MMHG | HEIGHT: 67 IN | WEIGHT: 178 LBS | SYSTOLIC BLOOD PRESSURE: 110 MMHG | OXYGEN SATURATION: 98 %

## 2022-05-10 DIAGNOSIS — I25.10 CORONARY ARTERY DISEASE INVOLVING NATIVE CORONARY ARTERY OF NATIVE HEART WITHOUT ANGINA PECTORIS: ICD-10-CM

## 2022-05-10 DIAGNOSIS — Z95.3 STATUS POST AORTIC VALVE REPLACEMENT WITH BIOPROSTHETIC VALVE: Chronic | ICD-10-CM

## 2022-05-10 DIAGNOSIS — E78.5 HYPERLIPIDEMIA LDL GOAL <70: ICD-10-CM

## 2022-05-10 DIAGNOSIS — E78.5 HYPERLIPIDEMIA LDL GOAL <70: Chronic | ICD-10-CM

## 2022-05-10 DIAGNOSIS — I10 ESSENTIAL HYPERTENSION: Primary | Chronic | ICD-10-CM

## 2022-05-10 PROCEDURE — 99214 OFFICE O/P EST MOD 30 MIN: CPT | Performed by: INTERNAL MEDICINE

## 2022-05-10 NOTE — PROGRESS NOTES
[facility]     Christian Mcintosh  1945   [id number]      Patient Care Team:  Merrill De La Paz MD as PCP - General (Internal Medicine)  Marshal Spangler IV, MD as Cardiologist (Interventional Cardiology)  Silvio Ahn MD as Consulting Physician (Urology)    Chief Complaint::   Chief Complaint   Patient presents with   • Hypertension     6 mo follow up            HPI    The patient presents today for an office visit for essential hypertension.    The patient reports that he is doing well. He mentions that he is not taking any amitriptyline for sleep anymore. He reports that he is no longer taking testosterone shots. He mentions that he is still taking his simvastatin medication. He reports that he went to see Dr. Spnagler last month and he wanted to change it from simvastatin to Crestor. He mentions that the pharmacy did not fill it because they told him that the insurance would not pay for it. He mentions that he is assuming that is because he just filled the simvastatin and they probably will probably after he uses that. He reports that he will check with them when that comes due to it and see if they still got that prescription for the other medication. He reports that he is still on the metformin, lisinopril, and Keppra half tablet. He states that he has finished the dental work. He mentions that he has to go back and do something sometime in 06/2022. He mentions that he had a sore throat in 01/17/2022 he was diagnosed with COVID-19. He reports in the middle of April that he could hardly swallow because his throat was sore. He reports that he was checked for flu and strep throat and they gave him a steroid shot. He reports that it lasted a week or so. He mentions that he still gets up occasionally and sneezes 8 to 10 times in a row. He reports that it has been particularly bad with all the pollen in the air. He denies any chest pain or pressure. He denies any upset stomach, nausea, or  vomiting. He reports that he is not as active as he was he mentions that he still works out a couple days a week. He reports that he is still going to work half a day and he is stopping that. He mentions that he is going to still go in on Mondays with the . He reports that he will try to start walking 4 to 5 days a week. He mentions that his knees bother him a little bit. He mention that he has had lightheadedness or fainting at times. He mentions that he had to have the Epley maneuver done, but after that everything was fine. He mentions that he has already been to the lab.        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 Padilla   • Benign prostatic hypertrophy w elevated PSA 01/03/2018    BX NO CANCER   • Bicuspid aortic valve    • Cataract    • Coronary artery disease 03/06/2013   • COVID-19 01/17/2022   • Diverticulosis 2002   • Gastric ulcer 2002   • GERD (gastroesophageal reflux disease)    • BX-Orevmka-Rzqdm tear 07/23/2013   • H/O atrial flutter    • History of basal cell cancer 2007   • History of echocardiogram     • History of rotator cuff tear    • Hyperlipidemia    • Hypertension    • Osteoarthritis 2011   • PONV (postoperative nausea and vomiting)    • Seizure (HCC)    • Skin cancer     skin.  BASAL CELL SKIN CANCER LEFT CHEEK   • Status post shoulder surgery    • Tear of left rotator cuff    • Tinnitus    • Typical atrial flutter (HCC) 06/07/2016    · Diagnosed postoperatively, asymptomatic. · Typical flutter pattern on ECG, 05/03/2013. Initiated on Coumadin. · Left atrial appendage excluded with AVR using Tiger Paw occluder. · Radiofrequency ablation by Dr. Brooks Padilla, July 2013.  · Coumadin initiated following ablation but stopped after development of GI bleeding and a Tarsha-Villeda tear.    • Urinary retention     mcqueen       Past Surgical History:   Procedure Laterality Date   • BASAL CELL CARCINOMA EXCISION  2007   • BONE SPUR ARM     • CARDIAC ABLATION     • CARDIAC CATHETERIZATION  03/06/2013   • CARDIAC VALVE REPLACEMENT  03/26/2013    bioprosthetic aortic valve   • CARDIAC VALVE REPLACEMENT     • CARPAL TUNNEL RELEASE     • CATARACT EXTRACTION     • COLONOSCOPY     • COLONOSCOPY  08/29/2012   • CYST REMOVAL     • CYSTOSCOPY TRANSURETHRAL RESECTION OF PROSTATE N/A 1/3/2018    Procedure: PROSTATE ULTRASOUND AND BIOPSY, CYSTOSCOPY TRANSURETHRAL RESECTION OF PROSTATE GREENLIGHT WITH VAPORIZATION;  Surgeon: Silvio Ahn MD;  Location: [facility];  Service:    • ENDOSCOPY  2002    w/biopsy   • EYE SURGERY      bilateral cataract extraction   • MCQUEEN CATHETER  11/01/2016   • HERNIA REPAIR  2010   • KNEE ARTHROSCOPY     • KNEE MENISCAL REPAIR Bilateral    • KNEE SURGERY Left 2011   • LASIK     • PROSTATE BIOPSY  01/03/2018   • ROTATOR CUFF REPAIR  10/26/2016   • SHOULDER SURGERY Left    • SKIN CANCER EXCISION  12/07/2016    left cheek   • SKIN CANCER EXCISION     • TURP / TRANSURETHRAL INCISION / DRAINAGE PROSTATE     • VASECTOMY         Family History   Problem Relation Age of Onset   • No Known Problems  "Mother    • Heart attack Father 60   • Coronary artery disease Father    • Heart disease Brother    • Diabetes Brother    • Diabetes Sister    • Other Son         -accident       Social History     Socioeconomic History   • Marital status:    Tobacco Use   • Smoking status: Former Smoker     Packs/day: 2.00     Years: 23.00     Pack years: 46.00     Types: Cigarettes     Start date: 1963     Quit date:      Years since quittin.3   • Smokeless tobacco: Never Used   Vaping Use   • Vaping Use: Never used   Substance and Sexual Activity   • Alcohol use: Yes     Alcohol/week: 1.0 standard drink     Types: 1 Shots of liquor per week     Comment: occasional    • Drug use: No   • Sexual activity: Defer       Allergies   Allergen Reactions   • Shellfish-Derived Products Anaphylaxis       Review of Systems     HEENT: Denies any hearing changes, eyesight changes, no headache or dizziness   NECK:  Denies any pain, stiffness, swelling or dysphagia   CHEST: Denies cough or wheeze or recent lung infections   CARDIAC: Denies chest pain, pressure or palpitations   ABD: Denies nausea, vomiting or abdominal pain   : Denies dysuria  NEURO:  Denies syncope, concussion, neuropathy  PSYCH: Denies any stress   EXTREM: Denies arthritic changes myalgia or arthralgia   SKIN: Denies any rash    Vital Signs  Vitals:    05/10/22 0833   BP: 110/62   BP Location: Left arm   Patient Position: Sitting   Cuff Size: Adult   Pulse: 53   Temp: 97.8 °F (36.6 °C)   TempSrc: Temporal   SpO2: 98%   Weight: 80.7 kg (178 lb)   Height: 170.2 cm (67.01\")   PainSc: 0-No pain     Body mass index is 27.87 kg/m².  BMI is >= 25 and < 30. (Overweight) The following options were offered after discussion: exercise counseling/recommendations and nutrition counseling/recommendations encouraged healthy cardiac diet with reduce carbs smaller portions weight loss     Advance Care Planning   ACP discussion was held with the patient during this " visit. Patient has an advance directive in EMR which is still valid.        Current Outpatient Medications:   •  amoxicillin (AMOXIL) 500 MG capsule, Take 4 capsules by mouth See Admin Instructions. 4 capsules 1 hour prior to procedure, Disp: 12 capsule, Rfl: 5  •  aspirin (RA Aspirin EC) 81 MG EC tablet, Take 1 tablet by mouth Daily., Disp: 90 tablet, Rfl: 3  •  B Complex Vitamins (VITAMIN B COMPLEX) capsule capsule, Take 1 capsule by mouth Daily., Disp: , Rfl:   •  Cholecalciferol (VITAMIN D3) 5000 units capsule capsule, Take 5,000 Units by mouth Daily., Disp: , Rfl:   •  Coenzyme Q10 (CO Q 10 PO), Take 1 capsule by mouth Daily., Disp: , Rfl:   •  DHEA 25 MG capsule, Take 25 mg by mouth Daily., Disp: , Rfl:   •  Docusate Sodium 100 MG capsule, Take 1 tablet by mouth daily., Disp: , Rfl:   •  levETIRAcetam (KEPPRA) 500 MG tablet, TAKE 1/2 TABLET BY MOUTH EVERY DAY, Disp: 45 tablet, Rfl: 3  •  lisinopril (PRINIVIL,ZESTRIL) 10 MG tablet, Take 1 tablet by mouth Daily., Disp: 90 tablet, Rfl: 3  •  meclizine (ANTIVERT) 25 MG tablet, Take 1 tablet by mouth 3 (Three) Times a Day As Needed for Dizziness., Disp: 25 tablet, Rfl: 2  •  metFORMIN (GLUCOPHAGE) 500 MG tablet, Take 1 tablet by mouth Daily., Disp: 90 tablet, Rfl: 3  •  Multiple Vitamins-Minerals (CENTRUM ADULTS PO), Take 1 tablet by mouth daily., Disp: , Rfl:   •  Omega-3 Fatty Acids (FISH OIL PO), Take 1 tablet by mouth Daily., Disp: , Rfl:   •  pantoprazole (PROTONIX) 40 MG EC tablet, TAKE 1 TABLET BY MOUTH DAILY, Disp: 90 tablet, Rfl: 3  •  rosuvastatin (CRESTOR) 10 MG tablet, Take 1 tablet by mouth Every Night., Disp: 90 tablet, Rfl: 3  •  sildenafil (REVATIO) 20 MG tablet, Take 1 tablet by mouth As Needed (as needed for ED). Take 3 as needed, Disp: 100 tablet, Rfl: 2    Physical Exam     ACE III MINI         HEENT: Pupils equal reactive ENT clear, no facial asymmetry, pharynx is clear  NECK:  No masses bruit or thyromegaly  CHEST: Clear without rales wheezes  or rhonchi  CARDIAC: Regular rhythm without gallop rub or click, blood pressure heart rate stable.   ABD: Liver spleen normal  : Deferred  NEURO: Intact   PSYCH: Normal  EXTREM: No significant arthritic changes, no edema.   SKIN: Clear       Results Review:    Recent Results (from the past 672 hour(s))   POCT Rapid Strep A    Collection Time: 22  7:36 PM    Specimen: Swab   Result Value Ref Range    Rapid Strep A Screen Negative Negative, VALID, INVALID, Not Performed    Internal Control Passed Passed    Lot Number [id number]     Expiration Date 2023    POCT Influenza A/B    Collection Time: 22  7:37 PM    Specimen: Swab   Result Value Ref Range    Rapid Influenza A Ag Negative Negative    Rapid Influenza B Ag Negative Negative    Internal Control Passed Passed    Lot Number [id number]     Expiration Date 2022      Procedures    Medication Review: Medications reviewed and noted    Patient wellness counseling  Exercise: Encouraged walking exercise  Diet: Encouraged healthy cardiac diet  Screening: Fasting CMP and lipid panel were drawn earlier this morning  Social History     Socioeconomic History   • Marital status:    Tobacco Use   • Smoking status: Former Smoker     Packs/day: 2.00     Years: 23.00     Pack years: 46.00     Types: Cigarettes     Start date: 1963     Quit date:      Years since quittin.3   • Smokeless tobacco: Never Used   Vaping Use   • Vaping Use: Never used   Substance and Sexual Activity   • Alcohol use: Yes     Alcohol/week: 1.0 standard drink     Types: 1 Shots of liquor per week     Comment: occasional    • Drug use: No   • Sexual activity: Defer        Assessment/Plan:    Problem List Items Addressed This Visit        Cardiac and Vasculature    Status post aortic valve replacement with bioprosthetic valve (Chronic)    Overview     · Echocardiogram (2012): Moderate AS, KLAUDIA of 0.8 cm2.  · Echocardiogram (2013): LVEF 55%, severe AS with  mean gradient of 65 mmHg.   · Bioprosthetic aortic valve replacement by Dr. Stuart Griffin (03/26/2013): 25-mm Magna Ease with ascending aortoplasty. Left atrial appendage excluded  · Echo (6/15/18): LVEF 48%. Normal functioning bioprosthetic aortic valve. Trivial paravalvular leak.  · Echo (4/23/2021): Echo unchanged from 6/15/2018           Current Assessment & Plan     Aortic valve replacement March 2013 currently stable without chest pain pressure, continue aspirin 81 mg daily           Relevant Medications    amoxicillin (AMOXIL) 500 MG capsule    Essential hypertension - Primary (Chronic)    Overview     • Target blood pressure <130/80 mmHg           Current Assessment & Plan     - Blood pressure in office was within normal limits 110/78 mm Hg.   - I have ordered a CMP and lipid panel.  - Follow up appointment in 6 months. At this appointment we will do an EKG and complete lab work.             Relevant Medications    lisinopril (PRINIVIL,ZESTRIL) 10 MG tablet    Coronary artery disease involving native coronary artery of native heart without angina pectoris    Overview     · Cardiac catheterization (03/06/2013): mild CAD           Current Assessment & Plan       Denies chest pain pressure or palpitations blood pressure stable continue aspirin lisinopril Crestor and fish oil           Relevant Medications    sildenafil (REVATIO) 20 MG tablet    aspirin (RA Aspirin EC) 81 MG EC tablet    Hyperlipidemia LDL goal <70    Overview     · Statin therapy indicated given presence of coronary artery disease           Current Assessment & Plan       Statin therapy recently changed to Crestor from simvastatin however pharmacy was unable to give the new Crestor prescription reason unknown and the patient will finish the simvastatin and try to start the Crestor 10 mg as ordered by cardiology             Relevant Medications    rosuvastatin (CRESTOR) 10 MG tablet           Patient Instructions   Fasting CMP and lipid are  pending  Continue all current medications and noted the patient has been changed to Crestor 10 mg from simvastatin but the pharmacy would not renew it until he finishes his simvastatin and then he will start the Crestor at 10 mg this was ordered by Dr. Baljinder Spangler.  Continue healthy cardiac diet and walking exercise  Return visit for annual wellness in 6 months or as needed       Plan of care reviewed with patient at the conclusion of today's visit. Education was provided regarding diagnosis, management, and any prescribed or recommended OTC medications.Patient verbalizes understanding of and agreement with management plan.         Merrill De La Paz MD      Note: Part of this note may be an electronic transcription/translation of spoken language to printed text using the Dragon Dictation system.    Transcribed from ambient dictation for Merrill De La Paz MD by Shelly Hager  05/10/22   12:22 EDT    Patient verbalized consent to the visit recording.  I have personally performed the services described in this document as transcribed by the above individual, and it is both accurate and complete.  Merrill De La Paz MD  5/10/2022  17:47 EDT        .

## 2022-05-10 NOTE — ASSESSMENT & PLAN NOTE
Denies chest pain pressure or palpitations blood pressure stable continue aspirin lisinopril Crestor and fish oil

## 2022-05-10 NOTE — ASSESSMENT & PLAN NOTE
- Blood pressure in office was within normal limits 110/78 mm Hg.   - I have ordered a CMP and lipid panel.  - Follow up appointment in 6 months. At this appointment we will do an EKG and complete lab work.

## 2022-05-10 NOTE — ASSESSMENT & PLAN NOTE
Statin therapy recently changed to Crestor from simvastatin however pharmacy was unable to give the new Crestor prescription reason unknown and the patient will finish the simvastatin and try to start the Crestor 10 mg as ordered by cardiology

## 2022-05-10 NOTE — ASSESSMENT & PLAN NOTE
Aortic valve replacement March 2013 currently stable without chest pain pressure, continue aspirin 81 mg daily

## 2022-05-10 NOTE — PATIENT INSTRUCTIONS
Fasting CMP and lipid are pending  Continue all current medications and noted the patient has been changed to Crestor 10 mg from simvastatin but the pharmacy would not renew it until he finishes his simvastatin and then he will start the Crestor at 10 mg this was ordered by Dr. Baljinder Spangler.  Continue healthy cardiac diet and walking exercise  Return visit for annual wellness in 6 months or as needed

## 2022-05-11 LAB
ALBUMIN SERPL-MCNC: 4.2 G/DL (ref 3.5–5.2)
ALBUMIN/GLOB SERPL: 1.7 G/DL
ALP SERPL-CCNC: 86 U/L (ref 39–117)
ALT SERPL-CCNC: 18 U/L (ref 1–41)
AST SERPL-CCNC: 21 U/L (ref 1–40)
BILIRUB SERPL-MCNC: 0.3 MG/DL (ref 0–1.2)
BUN SERPL-MCNC: 20 MG/DL (ref 8–23)
BUN/CREAT SERPL: 20.2 (ref 7–25)
CALCIUM SERPL-MCNC: 9.5 MG/DL (ref 8.6–10.5)
CHLORIDE SERPL-SCNC: 105 MMOL/L (ref 98–107)
CHOLEST SERPL-MCNC: 142 MG/DL (ref 0–200)
CO2 SERPL-SCNC: 24.8 MMOL/L (ref 22–29)
CREAT SERPL-MCNC: 0.99 MG/DL (ref 0.76–1.27)
EGFRCR SERPLBLD CKD-EPI 2021: 79.9 ML/MIN/1.73
GLOBULIN SER CALC-MCNC: 2.5 GM/DL
GLUCOSE SERPL-MCNC: 106 MG/DL (ref 65–99)
HDLC SERPL-MCNC: 43 MG/DL (ref 40–60)
LDLC SERPL CALC-MCNC: 80 MG/DL (ref 0–100)
POTASSIUM SERPL-SCNC: 4.7 MMOL/L (ref 3.5–5.2)
PROT SERPL-MCNC: 6.7 G/DL (ref 6–8.5)
SODIUM SERPL-SCNC: 140 MMOL/L (ref 136–145)
TRIGL SERPL-MCNC: 102 MG/DL (ref 0–150)
VLDLC SERPL CALC-MCNC: 19 MG/DL (ref 5–40)

## 2022-07-12 DIAGNOSIS — I10 ESSENTIAL HYPERTENSION: ICD-10-CM

## 2022-07-12 RX ORDER — LISINOPRIL 10 MG/1
10 TABLET ORAL DAILY
Qty: 90 TABLET | Refills: 3 | Status: SHIPPED | OUTPATIENT
Start: 2022-07-12 | End: 2022-10-19

## 2022-10-17 ENCOUNTER — TELEPHONE (OUTPATIENT)
Dept: INTERNAL MEDICINE | Facility: CLINIC | Age: 77
End: 2022-10-17

## 2022-10-17 DIAGNOSIS — E11.9 TYPE 2 DIABETES MELLITUS WITHOUT COMPLICATION, WITHOUT LONG-TERM CURRENT USE OF INSULIN: ICD-10-CM

## 2022-10-17 DIAGNOSIS — I10 ESSENTIAL HYPERTENSION: Primary | ICD-10-CM

## 2022-10-17 DIAGNOSIS — Z12.5 PROSTATE CANCER SCREENING: ICD-10-CM

## 2022-10-17 DIAGNOSIS — E78.5 HYPERLIPIDEMIA LDL GOAL <70: ICD-10-CM

## 2022-10-17 DIAGNOSIS — Z95.3 STATUS POST AORTIC VALVE REPLACEMENT WITH BIOPROSTHETIC VALVE: ICD-10-CM

## 2022-10-17 NOTE — TELEPHONE ENCOUNTER
"Pt called c/o not feeling well, dizzy, \"head feels big\", ringing ears, , stays hungry all the time, and  \"cloudy vision\". He said his vision just \"don't seem right\". He said he can see fine if he closes one eye. He wants to know if he can get his labs checked now for his upcoming appt to see if they show anything.  "

## 2022-10-17 NOTE — TELEPHONE ENCOUNTER
Intubated for airway protection on 9/30/19  Lung protective ventilation strategies  Titrate ventilator prescription to optimize oxygenation, ventilation, and acid base balance.  Proceed with ABC trial this morning  Anticipate extubation in the next 12-24 hours   Please inform patient that I ordered lab can be drawn fasting anytime and perhaps can set up an appointment for this week with me

## 2022-10-19 ENCOUNTER — HOSPITAL ENCOUNTER (OUTPATIENT)
Dept: CT IMAGING | Facility: HOSPITAL | Age: 77
Discharge: HOME | End: 2022-10-19

## 2022-10-19 ENCOUNTER — OFFICE VISIT (OUTPATIENT)
Dept: INTERNAL MEDICINE | Facility: CLINIC | Age: 77
End: 2022-10-19

## 2022-10-19 ENCOUNTER — LAB (OUTPATIENT)
Dept: LAB | Facility: HOSPITAL | Age: 77
End: 2022-10-19

## 2022-10-19 VITALS
HEART RATE: 64 BPM | OXYGEN SATURATION: 96 % | DIASTOLIC BLOOD PRESSURE: 66 MMHG | HEIGHT: 67 IN | SYSTOLIC BLOOD PRESSURE: 90 MMHG | BODY MASS INDEX: 28.47 KG/M2 | WEIGHT: 181.4 LBS | TEMPERATURE: 97.3 F

## 2022-10-19 DIAGNOSIS — R27.0 ATAXIA: Primary | ICD-10-CM

## 2022-10-19 DIAGNOSIS — I10 ESSENTIAL HYPERTENSION: ICD-10-CM

## 2022-10-19 DIAGNOSIS — Z95.3 STATUS POST AORTIC VALVE REPLACEMENT WITH BIOPROSTHETIC VALVE: ICD-10-CM

## 2022-10-19 DIAGNOSIS — R42 DIZZY: ICD-10-CM

## 2022-10-19 DIAGNOSIS — G40.409 GRAND MAL SEIZURE: ICD-10-CM

## 2022-10-19 DIAGNOSIS — E11.9 TYPE 2 DIABETES MELLITUS WITHOUT COMPLICATION, WITHOUT LONG-TERM CURRENT USE OF INSULIN: ICD-10-CM

## 2022-10-19 DIAGNOSIS — E78.5 HYPERLIPIDEMIA LDL GOAL <70: ICD-10-CM

## 2022-10-19 DIAGNOSIS — Z95.3 STATUS POST AORTIC VALVE REPLACEMENT WITH BIOPROSTHETIC VALVE: Chronic | ICD-10-CM

## 2022-10-19 DIAGNOSIS — Z12.5 PROSTATE CANCER SCREENING: ICD-10-CM

## 2022-10-19 PROCEDURE — 93000 ELECTROCARDIOGRAM COMPLETE: CPT | Performed by: INTERNAL MEDICINE

## 2022-10-19 PROCEDURE — 0 IOPAMIDOL PER 1 ML: Performed by: INTERNAL MEDICINE

## 2022-10-19 PROCEDURE — 99214 OFFICE O/P EST MOD 30 MIN: CPT | Performed by: INTERNAL MEDICINE

## 2022-10-19 PROCEDURE — 70470 CT HEAD/BRAIN W/O & W/DYE: CPT

## 2022-10-19 RX ORDER — LISINOPRIL 10 MG/1
10 TABLET ORAL DAILY
Qty: 90 TABLET | Refills: 3
Start: 2022-10-19 | End: 2022-11-30

## 2022-10-19 RX ADMIN — IOPAMIDOL 60 ML: 755 INJECTION, SOLUTION INTRAVENOUS at 16:15

## 2022-10-19 NOTE — PROGRESS NOTES
"[facility]     Christian Mcintosh  1945   [id number]      Patient Care Team:  Merrill De La Paz MD as PCP - General (Internal Medicine)  Marshal Spangler IV, MD as Cardiologist (Interventional Cardiology)  Silvio Ahn MD as Consulting Physician (Urology)    Chief Complaint::   Chief Complaint   Patient presents with   • Shortness of Breath     No energy , off balance  X's 9 days    • eye issues            HPI  The patient is a 77-year-old male complaining of shortness of breath, lack of energy, and being off balance for approximately 9 days with issues. He is a diabetic and hypertensive.    The patient conveys that he does not feel sick, however, he notes that he \"does not feel right\". The patient notes that he will become physically unbalanced while walking and he feels like he has no energy. The patient notes that he did not have this problem at his last cardiology appointment. The patient states that this started last Monday. The patient notes that recently, for approximately 2 days, he would wake up in the middle of the night and would be more unbalanced than usual when he tried walking. The patient conveys that he is not as active as he had been previously. He notes he used to go to the gym everyday. The patient states that he tried walking again recently but stopped because his knee was bothersome. The patient notes that he ended up having to have knee surgery and has gained a small amount of weight.    The patient reports that there has been a recent change to one of his medications. The patient notes that he started taking Crestor last Wednesday, however, he notes that his feeling of being unbalanced started before that medication was changed. The patient states that he was told to start taking a different medication after he is done out of his simvastatin prescription. The patient denies having the doses to any of his medications increased. The patient denies taking Viagra " "recently.    The patient reports he is a having \"a little trouble with his vision\". The patient states that it \"feels like his bilateral eyes are weak\". The patient states that his bilateral eyelids are clogged. The patient states he is having trouble driving at night. The patient states that he feels \"blinded\" at night because of vehicles with LED headlights.    The patient states that he has a family history of diabetes mellitus, and he notes that he is worried about his blood glucose levels.    The patient reports that his systolic blood pressure is normally near 120 mm Hg.    The patient reports he only occasionally wakes up 2 to 3 times a night. The patient states that he has not been having many dreams recently while he is asleep. The patient denies waking up with dyspnea.     The patient inquires if he needs to take an influenza vaccine or a COVID-19 vaccine yet.    The patient denies general leg weakness.      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 Padilla   • Benign prostatic hypertrophy w elevated PSA 01/03/2018 "    BX NO CANCER   • Bicuspid aortic valve    • Cataract    • Coronary artery disease 03/06/2013   • COVID-19 01/17/2022   • Diverticulosis 2002   • Gastric ulcer 2002   • GERD (gastroesophageal reflux disease)    • LN-Mffmpcy-Wxrdl tear 07/23/2013   • H/O atrial flutter    • History of basal cell cancer 2007   • History of echocardiogram    • History of rotator cuff tear    • Hyperlipidemia    • Hypertension    • Osteoarthritis 2011   • PONV (postoperative nausea and vomiting)    • Seizure (HCC)    • Skin cancer     skin.  BASAL CELL SKIN CANCER LEFT CHEEK   • Status post shoulder surgery    • Tear of left rotator cuff    • Tinnitus    • Typical atrial flutter (HCC) 06/07/2016    · Diagnosed postoperatively, asymptomatic. · Typical flutter pattern on ECG, 05/03/2013. Initiated on Coumadin. · Left atrial appendage excluded with AVR using Tiger Paw occluder. · Radiofrequency ablation by Dr. Brooks Padilla, July 2013.  · Coumadin initiated following ablation but stopped after development of GI bleeding and a Tarsha-Villeda tear.    • Urinary retention     mcqueen       Past Surgical History:   Procedure Laterality Date   • BASAL CELL CARCINOMA EXCISION  2007   • BONE SPUR ARM     • CARDIAC ABLATION     • CARDIAC CATHETERIZATION  03/06/2013   • CARDIAC VALVE REPLACEMENT  03/26/2013    bioprosthetic aortic valve   • CARDIAC VALVE REPLACEMENT     • CARPAL TUNNEL RELEASE     • CATARACT EXTRACTION     • COLONOSCOPY     • COLONOSCOPY  08/29/2012   • CYST REMOVAL     • CYSTOSCOPY TRANSURETHRAL RESECTION OF PROSTATE N/A 1/3/2018    Procedure: PROSTATE ULTRASOUND AND BIOPSY, CYSTOSCOPY TRANSURETHRAL RESECTION OF PROSTATE GREENLIGHT WITH VAPORIZATION;  Surgeon: Silvio Ahn MD;  Location: [facility];  Service:    • ENDOSCOPY  2002    w/biopsy   • EYE SURGERY      bilateral cataract extraction   • MCQUEEN CATHETER  11/01/2016   • HERNIA REPAIR  2010   • KNEE ARTHROSCOPY     • KNEE MENISCAL REPAIR Bilateral    • KNEE SURGERY Left  "   • LASIK     • PROSTATE BIOPSY  2018   • ROTATOR CUFF REPAIR  10/26/2016   • SHOULDER SURGERY Left    • SKIN CANCER EXCISION  2016    left cheek   • SKIN CANCER EXCISION     • TURP / TRANSURETHRAL INCISION / DRAINAGE PROSTATE     • VASECTOMY         Family History   Problem Relation Age of Onset   • No Known Problems Mother    • Heart attack Father 60   • Coronary artery disease Father    • Heart disease Brother    • Diabetes Brother    • Diabetes Sister    • Other Son         -accident       Social History     Socioeconomic History   • Marital status:    Tobacco Use   • Smoking status: Former     Packs/day: 2.00     Years: 23.00     Pack years: 46.00     Types: Cigarettes     Start date: 1963     Quit date:      Years since quittin.8   • Smokeless tobacco: Never   Vaping Use   • Vaping Use: Never used   Substance and Sexual Activity   • Alcohol use: Yes     Alcohol/week: 1.0 standard drink     Types: 1 Shots of liquor per week     Comment: occasional    • Drug use: No   • Sexual activity: Defer       Allergies   Allergen Reactions   • Shellfish-Derived Products Anaphylaxis       Review of Systems     HEENT: Denies any hearing changes, eyesight changes, no headache or dizziness  NECK: Denies any pain, stiffness, swelling or dysphagia  CHEST: Denies cough or wheeze or recent lung infections  CARDIAC: Denies chest pain, pressure or palpitations  ABD: Denies nausea, vomiting or abdominal pain  : Denies dysuria  NEURO: Reports loss of balance and light headedness  PSYCH: Denies any stress  EXTREM: Denies arthritic changes myalgia or arthralgia  SKIN: Denies any rash    Vital Signs  Vitals:    10/19/22 1241 10/19/22 1316 10/19/22 1317   BP: 108/70 90/66    BP Location: Left arm     Patient Position: Sitting     Cuff Size: Adult     Pulse: 64     Temp: 97.3 °F (36.3 °C)     TempSrc: Temporal     SpO2:   96%   Weight: 82.3 kg (181 lb 6.4 oz)     Height: 170.2 cm (67.01\")   "   PainSc: 0-No pain       Body mass index is 28.4 kg/m².  BMI is >= 25 and <30. (Overweight) The following options were offered after discussion;: nutrition counseling/recommendations     Advance Care Planning   ACP discussion was held with the patient during this visit. Patient has an advance directive in EMR which is still valid.        Current Outpatient Medications:   •  amoxicillin (AMOXIL) 500 MG capsule, Take 4 capsules by mouth See Admin Instructions. 4 capsules 1 hour prior to procedure, Disp: 12 capsule, Rfl: 5  •  aspirin (RA Aspirin EC) 81 MG EC tablet, Take 1 tablet by mouth Daily., Disp: 90 tablet, Rfl: 3  •  B Complex Vitamins (VITAMIN B COMPLEX) capsule capsule, Take 1 capsule by mouth Daily., Disp: , Rfl:   •  Cholecalciferol (VITAMIN D3) 5000 units capsule capsule, Take 5,000 Units by mouth Daily., Disp: , Rfl:   •  Coenzyme Q10 (CO Q 10 PO), Take 1 capsule by mouth Daily., Disp: , Rfl:   •  DHEA 25 MG capsule, Take 25 mg by mouth Daily., Disp: , Rfl:   •  levETIRAcetam (KEPPRA) 500 MG tablet, TAKE 1/2 TABLET BY MOUTH EVERY DAY, Disp: 45 tablet, Rfl: 3  •  lisinopril (PRINIVIL,ZESTRIL) 10 MG tablet, Take 1 tablet by mouth Daily. Hold as of oct 19, Disp: 90 tablet, Rfl: 3  •  meclizine (ANTIVERT) 25 MG tablet, Take 1 tablet by mouth 3 (Three) Times a Day As Needed for Dizziness., Disp: 25 tablet, Rfl: 2  •  metFORMIN (GLUCOPHAGE) 500 MG tablet, Take 1 tablet by mouth Daily., Disp: 90 tablet, Rfl: 3  •  Multiple Vitamins-Minerals (CENTRUM ADULTS PO), Take 1 tablet by mouth daily., Disp: , Rfl:   •  Omega-3 Fatty Acids (FISH OIL PO), Take 1 tablet by mouth Daily., Disp: , Rfl:   •  pantoprazole (PROTONIX) 40 MG EC tablet, TAKE 1 TABLET BY MOUTH DAILY, Disp: 90 tablet, Rfl: 3  •  rosuvastatin (CRESTOR) 10 MG tablet, Take 1 tablet by mouth Every Night., Disp: 90 tablet, Rfl: 3  •  Docusate Sodium 100 MG capsule, Take 1 tablet by mouth daily., Disp: , Rfl:   No current facility-administered medications  for this visit.    Physical Exam     ACE III MINI           HEENT: Pupils equal reactive ENT clear, no facial asymmetry, pharynx is clear  NECK: No masses bruit or thyromegaly  CHEST: Clear without rales wheezes or rhonchi  CARDIAC: Heart rate was 56 beats per minute and irregular. Blood pressure 90/70 mm Hg while lying down.  ABD: Liver spleen normal, good bowel sounds, nontender  : Deferred  NEURO: Intact  PSYCH: Normal  EXTREM: No significant arthritic changes, no edema  SKIN: Clear       Results Review:    No results found for this or any previous visit (from the past 672 hour(s)).    ECG 12 Lead    Date/Time: 10/19/2022 6:33 PM  Performed by: Merrill De La Paz MD  Authorized by: Merrill De La Paz MD   Comparison: compared with previous ECG from 11/10/2021  Similar to previous ECG  Comparison to previous ECG: Current EKG shows sinus rhythm with sinus arrhythmia and first-degree AV block with left axis and ST and T wave changes similar to EKG of November 10, 2021  Rhythm: sinus rhythm and sinus arrhythmia  Rate: normal  Conduction: 1st degree AV block  QRS axis: left  Other findings: non-specific ST-T wave changes    Clinical impression: abnormal EKG  Comments: Current EKG shows sinus rhythm with sinus arrhythmia first-degree AV block left axis and nonspecific ST and T wave changes similar to EKG of November 10, 2021            CT scan of brain ordered stat    Medication Review: Medications reviewed and noted    Patient wellness counseling  Exercise: ADL walking exercise  Diet: Healthy cardiac diet with reduce carbs smaller portions  Screening: Fasting labs pending CT scan is ordered stat  Social History     Socioeconomic History   • Marital status:    Tobacco Use   • Smoking status: Former     Packs/day: 2.00     Years: 23.00     Pack years: 46.00     Types: Cigarettes     Start date: 1963     Quit date:      Years since quittin.8   • Smokeless tobacco: Never   Vaping Use   • Vaping Use: Never  used   Substance and Sexual Activity   • Alcohol use: Yes     Alcohol/week: 1.0 standard drink     Types: 1 Shots of liquor per week     Comment: occasional    • Drug use: No   • Sexual activity: Defer        Assessment/Plan:    Problem List Items Addressed This Visit        Cardiac and Vasculature    Status post aortic valve replacement with bioprosthetic valve (Chronic)    Overview     · Echocardiogram (08/08/2012): Moderate AS, KLAUDIA of 0.8 cm2.  · Echocardiogram (02/26/2013): LVEF 55%, severe AS with mean gradient of 65 mmHg.   · Bioprosthetic aortic valve replacement by Dr. Stuart Griffin (03/26/2013): 25-mm Magna Ease with ascending aortoplasty. Left atrial appendage excluded  · Echo (6/15/18): LVEF 48%. Normal functioning bioprosthetic aortic valve. Trivial paravalvular leak.  · Echo (4/23/2021): Echo unchanged from 6/15/2018         Current Assessment & Plan     Aortic valve replaced March 2013 and currently stable         Relevant Medications    amoxicillin (AMOXIL) 500 MG capsule    Essential hypertension (Chronic)    Overview     • Target blood pressure <130/80 mmHg         Current Assessment & Plan       Essential hypertension with current blood pressure 90/66 and mildly symptomatic with lightheadedness the patient is on lisinopril 10 mg daily we will asked the patient to hold his lisinopril as of today lab of CMP is pending from this morning         Relevant Medications    lisinopril (PRINIVIL,ZESTRIL) 10 MG tablet    Hyperlipidemia LDL goal <70    Overview     · Statin therapy indicated given presence of coronary artery disease         Current Assessment & Plan       Statin therapy was recently changed to Crestor 10 mg daily denies myalgia arthralgia         Relevant Medications    rosuvastatin (CRESTOR) 10 MG tablet       Endocrine and Metabolic    Type 2 diabetes mellitus without complication, without long-term current use of insulin (HCC)    Overview     Diabetes type 2 on metformin 500 mg daily  denies nausea vomiting or abdominal pain or diarrhea continue therapy A1c and CMP are pending         Current Assessment & Plan       On metformin 500 mg daily A1c and CMP are pending         Relevant Medications    metFORMIN (GLUCOPHAGE) 500 MG tablet       Neuro    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         Current Assessment & Plan     Remote seizure 2015 on Keppra 500 mg 1/2 tablet daily         Ataxia - Primary    Overview     Patient is noted and unsteady gait and difficulty with balance in the last week I have he denies recent change in medication except for the addition of Crestor which he started taking after the unsteady gait and balance problem began  The patient denies headache does have some mild vision reduction changes denies hearing change denies vertigo complains of lightheadedness particular with positional change.  The patient has had no falls he denies any specific weakness of extremities other than the fact that he is just unsteady with ambulation         Current Assessment & Plan     Fasting lab from this a.m. is pending  Will obtain stat CT scan of the brain    CT scan of the brain was done this afternoon and shows no acute changes but mild sinusitis and patient is informed of same    We will hold  the lisinopril of 10 mg daily because of the hypotension        Other Visit Diagnoses     Dizzy        The patient will receive a computed tomography scan of his brain because of his reported lightheadedness, vision changes, and ataxic gait.    Relevant Orders    CT Head With & Without Contrast (Completed)           Patient Instructions   Stop lisinopril  CT scan of brain stat  Await lab done earlier this morning  If symptoms worse proceed to the emergency room  I will call patient's regarding the CT scan and the lab  Will not change  follow-up appointment but will of course see him sooner than his follow-up appointment which is 11/14/2020       Plan of care reviewed with patient at the conclusion of today's visit. Education was provided regarding diagnosis, management, and any prescribed or recommended OTC medications.Patient verbalizes understanding of and agreement with management plan.         Merrill De La Paz MD      Note: Part of this note may be an electronic transcription/translation of spoken language to printed text using the Dragon Dictation system.    Transcribed from ambient dictation for Merrill De La Paz MD by Jaycee Cortez.  10/19/22   15:08 EDT    Patient or patient representative verbalized consent to the visit recording.  I have personally performed the services described in this document as transcribed by the above individual, and it is both accurate and complete.  Merrill De La Paz MD  10/19/2022  18:23 EDT

## 2022-10-19 NOTE — ASSESSMENT & PLAN NOTE
Essential hypertension with current blood pressure 90/66 and mildly symptomatic with lightheadedness the patient is on lisinopril 10 mg daily we will asked the patient to hold his lisinopril as of today lab of CMP is pending from this morning

## 2022-10-19 NOTE — ASSESSMENT & PLAN NOTE
Fasting lab from this a.m. is pending  Will obtain stat CT scan of the brain    CT scan of the brain was done this afternoon and shows no acute changes but mild sinusitis and patient is informed of same    We will hold  the lisinopril of 10 mg daily because of the hypotension

## 2022-10-19 NOTE — PATIENT INSTRUCTIONS
Stop lisinopril  CT scan of brain stat  Await lab done earlier this morning  If symptoms worse proceed to the emergency room  I will call patient's regarding the CT scan and the lab  Will not change follow-up appointment but will of course see him sooner than his follow-up appointment which is 11/14/2020

## 2022-10-19 NOTE — PROGRESS NOTES
"Chief Complaint  Christian Mcintosh is a 77 y.o. male presenting for Shortness of Breath (No energy , off balance  X's 9 days ) and eye issues.     History of Present Illness  ***    The following portions of the patient's history were reviewed and updated as appropriate: {history reviewed:20406::\"allergies\",\"current medications\",\"past family history\",\"past medical history\",\"past social history\",\"past surgical history\",\"problem list\"}.    Objective  /70 (BP Location: Left arm, Patient Position: Sitting, Cuff Size: Adult)   Pulse 64   Temp 97.3 °F (36.3 °C) (Temporal)   Ht 170.2 cm (67.01\")   Wt 82.3 kg (181 lb 6.4 oz)   BMI 28.40 kg/m²     Physical Exam    Assessment/Plan   There are no diagnoses linked to this encounter.    No follow-ups on file.    Merrill De La Paz MD  Family Medicine  10/19/2022    "

## 2022-10-20 LAB
ALBUMIN SERPL-MCNC: 4.7 G/DL (ref 3.5–5.2)
ALBUMIN/CREAT UR: 6 MG/G CREAT (ref 0–29)
ALBUMIN/GLOB SERPL: 2 G/DL
ALP SERPL-CCNC: 91 U/L (ref 39–117)
ALT SERPL-CCNC: 23 U/L (ref 1–41)
AST SERPL-CCNC: 32 U/L (ref 1–40)
BASOPHILS # BLD AUTO: 0.12 10*3/MM3 (ref 0–0.2)
BASOPHILS NFR BLD AUTO: 1.6 % (ref 0–1.5)
BILIRUB SERPL-MCNC: 0.5 MG/DL (ref 0–1.2)
BUN SERPL-MCNC: 15 MG/DL (ref 8–23)
BUN/CREAT SERPL: 13.2 (ref 7–25)
CALCIUM SERPL-MCNC: 9.8 MG/DL (ref 8.6–10.5)
CHLORIDE SERPL-SCNC: 103 MMOL/L (ref 98–107)
CHOLEST SERPL-MCNC: 178 MG/DL (ref 0–200)
CO2 SERPL-SCNC: 28 MMOL/L (ref 22–29)
CREAT SERPL-MCNC: 1.14 MG/DL (ref 0.76–1.27)
CREAT UR-MCNC: 149.9 MG/DL
EGFRCR SERPLBLD CKD-EPI 2021: 67.1 ML/MIN/1.73
EOSINOPHIL # BLD AUTO: 0.6 10*3/MM3 (ref 0–0.4)
EOSINOPHIL NFR BLD AUTO: 8.1 % (ref 0.3–6.2)
ERYTHROCYTE [DISTWIDTH] IN BLOOD BY AUTOMATED COUNT: 12.3 % (ref 12.3–15.4)
GLOBULIN SER CALC-MCNC: 2.3 GM/DL
GLUCOSE SERPL-MCNC: 124 MG/DL (ref 65–99)
HBA1C MFR BLD: 6.7 % (ref 4.8–5.6)
HCT VFR BLD AUTO: 45.2 % (ref 37.5–51)
HDLC SERPL-MCNC: 39 MG/DL (ref 40–60)
HGB BLD-MCNC: 14.9 G/DL (ref 13–17.7)
IMM GRANULOCYTES # BLD AUTO: 0.03 10*3/MM3 (ref 0–0.05)
IMM GRANULOCYTES NFR BLD AUTO: 0.4 % (ref 0–0.5)
LDLC SERPL CALC-MCNC: 108 MG/DL (ref 0–100)
LYMPHOCYTES # BLD AUTO: 2.4 10*3/MM3 (ref 0.7–3.1)
LYMPHOCYTES NFR BLD AUTO: 32.4 % (ref 19.6–45.3)
MCH RBC QN AUTO: 29.4 PG (ref 26.6–33)
MCHC RBC AUTO-ENTMCNC: 33 G/DL (ref 31.5–35.7)
MCV RBC AUTO: 89.2 FL (ref 79–97)
MICROALBUMIN UR-MCNC: 8.3 UG/ML
MONOCYTES # BLD AUTO: 0.71 10*3/MM3 (ref 0.1–0.9)
MONOCYTES NFR BLD AUTO: 9.6 % (ref 5–12)
NEUTROPHILS # BLD AUTO: 3.54 10*3/MM3 (ref 1.7–7)
NEUTROPHILS NFR BLD AUTO: 47.9 % (ref 42.7–76)
NRBC BLD AUTO-RTO: 0.1 /100 WBC (ref 0–0.2)
PLATELET # BLD AUTO: 189 10*3/MM3 (ref 140–450)
POTASSIUM SERPL-SCNC: 4.6 MMOL/L (ref 3.5–5.2)
PROT SERPL-MCNC: 7 G/DL (ref 6–8.5)
PSA SERPL-MCNC: 1.03 NG/ML (ref 0–4)
RBC # BLD AUTO: 5.07 10*6/MM3 (ref 4.14–5.8)
SODIUM SERPL-SCNC: 142 MMOL/L (ref 136–145)
TRIGL SERPL-MCNC: 175 MG/DL (ref 0–150)
TSH SERPL DL<=0.005 MIU/L-ACNC: 1.61 UIU/ML (ref 0.27–4.2)
VLDLC SERPL CALC-MCNC: 31 MG/DL (ref 5–40)
WBC # BLD AUTO: 7.4 10*3/MM3 (ref 3.4–10.8)

## 2022-10-24 DIAGNOSIS — E11.9 TYPE 2 DIABETES MELLITUS WITHOUT COMPLICATION, WITHOUT LONG-TERM CURRENT USE OF INSULIN: ICD-10-CM

## 2022-10-27 ENCOUNTER — TELEPHONE (OUTPATIENT)
Dept: INTERNAL MEDICINE | Facility: CLINIC | Age: 77
End: 2022-10-27

## 2022-10-27 RX ORDER — AZITHROMYCIN 250 MG/1
TABLET, FILM COATED ORAL
Qty: 6 TABLET | Refills: 0 | Status: SHIPPED | OUTPATIENT
Start: 2022-10-27 | End: 2022-11-14

## 2022-10-27 RX ORDER — MECLIZINE HYDROCHLORIDE 25 MG/1
25 TABLET ORAL 3 TIMES DAILY PRN
Qty: 25 TABLET | Refills: 2 | Status: SHIPPED | OUTPATIENT
Start: 2022-10-27 | End: 2022-12-29

## 2022-10-27 NOTE — TELEPHONE ENCOUNTER
PATIENT CALLED REQUESTING A CALL BACK REGAURDING LAST VISIT. PATIENT STATED THAT THEY GOT BETTER FOR A FEW DAYS BUT IS BACK TO WHERE THEY WERE AT THE TIME OF THEIR LAST VISIT

## 2022-10-27 NOTE — TELEPHONE ENCOUNTER
Message noted about patient continued congestion with previously normal lab work and CT scan of brain, I suggest a Z-Haile and have sent the prescription to her drugstore, and suggest refill the meclizine prescription that was sent on October 10 and take 3 times a day while taking the Z-Haile.  Please inform

## 2022-10-27 NOTE — TELEPHONE ENCOUNTER
See note below   I called patient he stated he is still having balance issues , feels woosy , unstable , has a little congestion and runny nose that he states has been going on for a little while off and on. He did a home covid test last week it was negative. He wants to know if you would send something in for him because he is leaving town for the weekend tomorrow

## 2022-11-13 PROBLEM — Z00.00 MEDICARE ANNUAL WELLNESS VISIT, SUBSEQUENT: Status: ACTIVE | Noted: 2022-11-13

## 2022-11-13 NOTE — ASSESSMENT & PLAN NOTE
Remote left and right knee meniscus repair   Visco plasty right knee  Remote left shoulder arthroplasty  Right shoulder arthritic change

## 2022-11-14 ENCOUNTER — OFFICE VISIT (OUTPATIENT)
Dept: INTERNAL MEDICINE | Facility: CLINIC | Age: 77
End: 2022-11-14

## 2022-11-14 VITALS
HEIGHT: 67 IN | DIASTOLIC BLOOD PRESSURE: 70 MMHG | SYSTOLIC BLOOD PRESSURE: 100 MMHG | BODY MASS INDEX: 28.72 KG/M2 | WEIGHT: 183 LBS | HEART RATE: 52 BPM

## 2022-11-14 DIAGNOSIS — E78.5 HYPERLIPIDEMIA LDL GOAL <70: ICD-10-CM

## 2022-11-14 DIAGNOSIS — I10 ESSENTIAL HYPERTENSION: Chronic | ICD-10-CM

## 2022-11-14 DIAGNOSIS — G40.409 GRAND MAL SEIZURE: ICD-10-CM

## 2022-11-14 DIAGNOSIS — Z95.3 STATUS POST AORTIC VALVE REPLACEMENT WITH BIOPROSTHETIC VALVE: Chronic | ICD-10-CM

## 2022-11-14 DIAGNOSIS — M15.9 PRIMARY OSTEOARTHRITIS INVOLVING MULTIPLE JOINTS: Chronic | ICD-10-CM

## 2022-11-14 DIAGNOSIS — Z00.00 MEDICARE ANNUAL WELLNESS VISIT, SUBSEQUENT: Primary | Chronic | ICD-10-CM

## 2022-11-14 DIAGNOSIS — M19.012 PRIMARY OSTEOARTHRITIS OF LEFT SHOULDER: ICD-10-CM

## 2022-11-14 DIAGNOSIS — R27.0 ATAXIA: ICD-10-CM

## 2022-11-14 DIAGNOSIS — K21.9 CHRONIC GERD: ICD-10-CM

## 2022-11-14 DIAGNOSIS — E11.9 TYPE 2 DIABETES MELLITUS WITHOUT COMPLICATION, WITHOUT LONG-TERM CURRENT USE OF INSULIN: ICD-10-CM

## 2022-11-14 PROCEDURE — 96160 PT-FOCUSED HLTH RISK ASSMT: CPT | Performed by: INTERNAL MEDICINE

## 2022-11-14 PROCEDURE — G0439 PPPS, SUBSEQ VISIT: HCPCS | Performed by: INTERNAL MEDICINE

## 2022-11-14 PROCEDURE — 1159F MED LIST DOCD IN RCRD: CPT | Performed by: INTERNAL MEDICINE

## 2022-11-14 PROCEDURE — 1170F FXNL STATUS ASSESSED: CPT | Performed by: INTERNAL MEDICINE

## 2022-11-14 PROCEDURE — G0008 ADMIN INFLUENZA VIRUS VAC: HCPCS | Performed by: INTERNAL MEDICINE

## 2022-11-14 PROCEDURE — 90662 IIV NO PRSV INCREASED AG IM: CPT | Performed by: INTERNAL MEDICINE

## 2022-11-14 PROCEDURE — 99214 OFFICE O/P EST MOD 30 MIN: CPT | Performed by: INTERNAL MEDICINE

## 2022-11-14 RX ORDER — ROSUVASTATIN CALCIUM 10 MG/1
10 TABLET, COATED ORAL NIGHTLY
Qty: 90 TABLET | Refills: 3 | Status: SHIPPED
Start: 2022-11-14 | End: 2022-11-30

## 2022-11-14 NOTE — ASSESSMENT & PLAN NOTE
Diabetes type 2 on metformin 500 mg twice daily with recent A1c of 6.70 continue therapy and healthy cardiac healthy diabetic diet

## 2022-11-14 NOTE — ASSESSMENT & PLAN NOTE
Patient is on Keppra 500 mg tablets 1/2 tablet every day the patient has had no seizures in the past 7 years continue the

## 2022-11-14 NOTE — ASSESSMENT & PLAN NOTE
Aortic valve replacement March 2013 currently stable without chest pain pressure tightness or palpitations continue aspirin 81, and Crestor 10 mg,  Patient's blood pressure is low range with no significant positional changes with sitting supine or standing but he is slightly lightheaded and does have mild ataxia, the patient believes this began when he started the Crestor and quit taking the simvastatin  We will have the patient hold Crestor for 2 weeks and see if the ataxia improves

## 2022-11-14 NOTE — ASSESSMENT & PLAN NOTE
Patient has a past history of essential hypertension and has been on lisinopril 10 mg however was held on 10/19/2022 secondary to low range blood pressure of 100 the patient's blood pressure continues to be low range of 100 systolic with no change in symptoms or significant positional blood pressure changes when sitting supine or standing we will continue to hold lisinopril

## 2022-11-14 NOTE — ASSESSMENT & PLAN NOTE
Patient's gait is slightly ataxic with associated mild lightheadedness he continues to run a low range blood pressure of 100/70 and no significant changes with sitting supine or standing he is no longer on the lisinopril of 10 mg the patient believes that the Crestor is a potential cause of the patient's ataxia, we will discontinue the Crestor of 10 mg for 2 weeks he is to notify us in 10 days of changes or improvement and if improved will change back to simvastatin.

## 2022-11-14 NOTE — PATIENT INSTRUCTIONS
Hold Crestor for 2 weeks  Call office in 10 days  If improved will discontinue Crestor and resume simvastatin or atorvastatin  If not improved will resume Crestor and add therapy to raise blood pressure  Continue all other therapy  Continue walking activity and healthy cardiac diet  Return visit in 2 weeks

## 2022-11-14 NOTE — PROGRESS NOTES
QUICK REFERENCE INFORMATION:  The ABCs of the Annual Wellness Visit    Subsequent Medicare Wellness Visit    HEALTH RISK ASSESSMENT    1945    Recent Hospitalizations:  No hospitalization(s) within the last year..        Current Medical Providers:  Patient Care Team:  Merrill De La Paz MD as PCP - General (Internal Medicine)  Marshal Spangler IV, MD as Cardiologist (Interventional Cardiology)  Silvio Ahn MD as Consulting Physician (Urology)        Smoking Status:  Social History     Tobacco Use   Smoking Status Former   • Packs/day: 2.00   • Years: 23.00   • Pack years: 46.00   • Types: Cigarettes   • Start date: 1963   • Quit date:    • Years since quittin.8   Smokeless Tobacco Never       Alcohol Consumption:  Social History     Substance and Sexual Activity   Alcohol Use Yes   • Alcohol/week: 1.0 standard drink   • Types: 1 Shots of liquor per week    Comment: occasional        Depression Screen:   PHQ-2/PHQ-9 Depression Screening 2022   Retired PHQ-9 Total Score -   Retired Total Score -   Little Interest or Pleasure in Doing Things 1-->several days   Feeling Down, Depressed or Hopeless 1-->several days   PHQ-9: Brief Depression Severity Measure Score 2       Health Habits and Functional and Cognitive Screening:  Functional & Cognitive Status 2022   Do you have difficulty preparing food and eating? No   Do you have difficulty bathing yourself, getting dressed or grooming yourself? No   Do you have difficulty using the toilet? No   Do you have difficulty moving around from place to place? No   Do you have trouble with steps or getting out of a bed or a chair? No   Current Diet Unhealthy Diet   Dental Exam Up to date   Eye Exam Up to date   Exercise (times per week) 0 times per week   Current Exercises Include No Regular Exercise   Current Exercise Activities Include -   Do you need help using the phone?  No   Are you deaf or do you have serious difficulty hearing?  No    Do you need help with transportation? No   Do you need help shopping? No   Do you need help preparing meals?  No   Do you need help with housework?  No   Do you need help with laundry? No   Do you need help taking your medications? No   Do you need help managing money? No   Do you ever drive or ride in a car without wearing a seat belt? No   Have you felt unusual stress, anger or loneliness in the last month? No   Who do you live with? Spouse   If you need help, do you have trouble finding someone available to you? No   Have you been bothered in the last four weeks by sexual problems? Yes   Do you have difficulty concentrating, remembering or making decisions? No       Fall Risk Screen:  BEBETO Fall Risk Assessment was completed, and patient is at LOW risk for falls.Assessment completed on:11/14/2022    ACE III MINI        Does the patient have evidence of cognitive impairment? No    Aspirin use counseling: Taking ASA appropriately as indicated    Recent Lab Results:  CMP:  Lab Results   Component Value Date    BUN 15 10/19/2022    CREATININE 1.14 10/19/2022    EGFRIFNONA 69 11/10/2021    EGFRIFAFRI 84 11/10/2021    BCR 13.2 10/19/2022     10/19/2022    K 4.6 10/19/2022    CO2 28.0 10/19/2022    CALCIUM 9.8 10/19/2022    PROTENTOTREF 7.0 10/19/2022    ALBUMIN 4.70 10/19/2022    LABGLOBREF 2.3 10/19/2022    LABIL2 2.0 10/19/2022    BILITOT 0.5 10/19/2022    ALKPHOS 91 10/19/2022    AST 32 10/19/2022    ALT 23 10/19/2022     HbA1c:  Lab Results   Component Value Date    HGBA1C 6.70 (H) 10/19/2022    HGBA1C 5.80 (H) 11/10/2021     Microalbumin:  Lab Results   Component Value Date    MICROALBUR 8.3 10/19/2022     Lipid Panel  Lab Results   Component Value Date    CHOL 140 10/08/2019    TRIG 175 (H) 10/19/2022    HDL 39 (L) 10/19/2022     (H) 10/19/2022    AST 32 10/19/2022    ALT 23 10/19/2022       Visual Acuity:  No results found.    Age-appropriate Screening Schedule:  Refer to the list below for  future screening recommendations based on patient's age, sex and/or medical conditions. Orders for these recommended tests are listed in the plan section. The patient has been provided with a written plan.    Health Maintenance   Topic Date Due   • ZOSTER VACCINE (2 of 3) 06/05/2015   • DIABETIC FOOT EXAM  04/10/2020   • TDAP/TD VACCINES (2 - Td or Tdap) 01/10/2021   • DIABETIC EYE EXAM  10/05/2021   • HEMOGLOBIN A1C  04/19/2023   • LIPID PANEL  10/19/2023   • URINE MICROALBUMIN  10/19/2023   • INFLUENZA VACCINE  Completed        Subjective   History of Present Illness    Christian Mcintosh is a 77 y.o. male who presents for a Subsequent Wellness Visit.  The patient is a 77-year-old male who presents for a Medicare annual wellness exam. He is complaining of dizziness described as an unsteadiness. It seems worse in the morning when it is related to the Crestor. It seems to have started and statin therapy was changed to Crestor. He has a history of essential hypertension, aortic valve replacement, mixed hyperlipidemia, diabetes, gastroesophageal reflux disease, arthritis, and ataxia.    The patient reports that he is still experiencing the same symptoms, but it is not as bad as before. He states that he is still unsteady. He states that he has experienced a little bit of dizziness.. He states that he has had to hold onto the chair rail when he is walking. He denies any falls. He states that he has not flushed his ears in a while. He denies any pain in the frontal sinuses or maxillary sinuses.    The patient reports that he went to the eye doctor on 11/07/2022. He states that he was told that he did not have any evidence of diabetes. He states that he is going to have a yttrium aluminum garnet procedure done.  CHRONIC CONDITIONS    The following portions of the patient's history were reviewed and updated as appropriate: allergies, current medications, past family history, past medical history, past social history and past  surgical history.    Outpatient Medications Prior to Visit   Medication Sig Dispense Refill   • amoxicillin (AMOXIL) 500 MG capsule Take 4 capsules by mouth See Admin Instructions. 4 capsules 1 hour prior to procedure 12 capsule 5   • aspirin (RA Aspirin EC) 81 MG EC tablet Take 1 tablet by mouth Daily. 90 tablet 3   • B Complex Vitamins (VITAMIN B COMPLEX) capsule capsule Take 1 capsule by mouth Daily.     • Cholecalciferol (VITAMIN D3) 5000 units capsule capsule Take 5,000 Units by mouth Daily.     • Coenzyme Q10 (CO Q 10 PO) Take 1 capsule by mouth Daily.     • DHEA 25 MG capsule Take 25 mg by mouth Daily.     • levETIRAcetam (KEPPRA) 500 MG tablet TAKE 1/2 TABLET BY MOUTH EVERY DAY 45 tablet 3   • meclizine (ANTIVERT) 25 MG tablet Take 1 tablet by mouth 3 (Three) Times a Day As Needed for Dizziness. 25 tablet 2   • metFORMIN (GLUCOPHAGE) 500 MG tablet Take 1 tablet by mouth 2 (Two) Times a Day With Meals. 180 tablet 1   • Multiple Vitamins-Minerals (CENTRUM ADULTS PO) Take 1 tablet by mouth daily.     • Omega-3 Fatty Acids (FISH OIL PO) Take 1 tablet by mouth Daily.     • pantoprazole (PROTONIX) 40 MG EC tablet TAKE 1 TABLET BY MOUTH DAILY 90 tablet 3   • rosuvastatin (CRESTOR) 10 MG tablet Take 1 tablet by mouth Every Night. 90 tablet 3   • azithromycin (Zithromax Z-Haile) 250 MG tablet Take 2 tablets by mouth on day 1, then 1 tablet daily on days 2-5 6 tablet 0   • lisinopril (PRINIVIL,ZESTRIL) 10 MG tablet Take 1 tablet by mouth Daily. Hold as of oct 19 90 tablet 3     No facility-administered medications prior to visit.       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 Patient has an advance directive in EMR which is still valid.     Identification of Risk Factors:  Risk factors include: Cardiovascular risk.    Review of Systems  HEENT: Denies any hearing changes, eyesight changes, no headache or dizziness  NECK: Denies any pain, stiffness, swelling or dysphagia  CHEST: Denies cough or wheeze or recent lung infections  CARDIAC: Denies chest pain, pressure or palpitations  ABD: Denies nausea, vomiting or abdominal pain  : Denies dysuria  NEURO: Denies syncope, concussion, neuropathy  PSYCH: Denies any stress  EXTREM: Denies arthritic changes myalgia or arthralgia  SKIN: Denies any rash    Compared to one year ago, the patient feels his physical health is worse.  Compared to one year ago, the patient feels his mental health is the same.    Objective     Physical Exam   HEENT: Pupils equal reactive ENT clear, no facial asymmetry, pharynx is clear  NECK: No masses bruit or thyromegaly  CHEST: Clear without rales wheezes or rhonchi  CARDIAC: Regular rhythm without gallop rub or click, blood pressure heart rate stable  ABD: Liver spleen normal, good bowel sounds, nontender  : Deferred  NEURO: Intact  PSYCH: Normal  EXTREM: No significant arthritic changes, no edema  SKIN: Clear    Procedures Patient Wellness Counseling:   Plan of care reviewed with patient at the conclusion of today's visit. Education  "was provided in regards to diagnosis, diet and exercise, prostate cancer screening discussed including benefit of early detection, potential need for follow-up, and harms associated with additional management. Patient agrees to screening.    Nutrition, physical activity, healthy weight,ways to reduce stress, adequate sleep, injury prevention, misuse of tobacco, alcohol and drugs, sexual behavior and STD's, dental health, mental health, and immunizations.    Plan of care reviewed with patient at the conclusion of today's visit. Education was provided regarding diagnosis, management and any prescribed or recommended OTC medications.  Patient verbalizes understanding of and agreement with management plan.        Vitals:    11/14/22 0915   BP: 100/70   BP Location: Left arm   Patient Position: Sitting   Cuff Size: Adult   Pulse: 52   Weight: 83 kg (183 lb)   Height: 170.2 cm (67\")   PainSc: 0-No pain       BMI is >= 25 and <30. (Overweight) The following options were offered after discussion;: nutrition counseling/recommendations      Assessment & Plan   Problem List Items Addressed This Visit        Cardiac and Vasculature    Status post aortic valve replacement with bioprosthetic valve (Chronic)    Overview     · Echocardiogram (08/08/2012): Moderate AS, KLAUDIA of 0.8 cm2.  · Echocardiogram (02/26/2013): LVEF 55%, severe AS with mean gradient of 65 mmHg.   · Bioprosthetic aortic valve replacement by Dr. Stuart Griffin (03/26/2013): 25-mm Magna Ease with ascending aortoplasty. Left atrial appendage excluded  · Echo (6/15/18): LVEF 48%. Normal functioning bioprosthetic aortic valve. Trivial paravalvular leak.  · Echo (4/23/2021): Echo unchanged from 6/15/2018         Current Assessment & Plan     Aortic valve replacement March 2013 currently stable without chest pain pressure tightness or palpitations continue aspirin 81, and Crestor 10 mg,  Patient's blood pressure is low range with no significant positional changes with " sitting supine or standing but he is slightly lightheaded and does have mild ataxia, the patient believes this began when he started the Crestor and quit taking the simvastatin  We will have the patient hold Crestor for 2 weeks and see if the ataxia improves         Relevant Medications    amoxicillin (AMOXIL) 500 MG capsule    Essential hypertension (Chronic)    Overview     • Target blood pressure <130/80 mmHg         Current Assessment & Plan       Patient has a past history of essential hypertension and has been on lisinopril 10 mg however was held on 10/19/2022 secondary to low range blood pressure of 100 the patient's blood pressure continues to be low range of 100 systolic with no change in symptoms or significant positional blood pressure changes when sitting supine or standing we will continue to hold lisinopril         Relevant Medications    lisinopril (PRINIVIL,ZESTRIL) 10 MG tablet    Hyperlipidemia LDL goal <70    Overview     · Statin therapy indicated given presence of coronary artery disease         Current Assessment & Plan       Previously on simvastatin and now on Crestor 10 mg daily the patient states that since beginning the Crestor he has had lightheadedness and mild ataxia the patient's blood pressure remains in low range with no significant positional changes of sitting supine or standing  We will hold Crestor for 2 weeks and have the patient call us in 10 days let us know how he is feeling and if his symptoms have improved if they have improved we will go back to the simvastatin which he tolerated well, if his symptoms have not improved will resume Crestor at 10 mg daily         Relevant Medications    rosuvastatin (CRESTOR) 10 MG tablet       Endocrine and Metabolic    Type 2 diabetes mellitus without complication, without long-term current use of insulin (HCC)    Overview     Diabetes type 2 on metformin 500 mg daily denies nausea vomiting or abdominal pain or diarrhea continue therapy  A1c and CMP are pending         Current Assessment & Plan       Diabetes type 2 on metformin 500 mg twice daily with recent A1c of 6.70 continue therapy and healthy cardiac healthy diabetic diet         Relevant Medications    metFORMIN (GLUCOPHAGE) 500 MG tablet       Gastrointestinal Abdominal     Chronic GERD    Overview     History of chronic GERD and gastritis on Protonix 40 mg daily         Current Assessment & Plan     Chronic GERD gastritis on pantoprazole 40 mg daily continue 30         Relevant Medications    pantoprazole (PROTONIX) 40 MG EC tablet       Health Encounters    Medicare annual wellness visit, subsequent - Primary    Overview     Patient 77-year-old male presents for Medicare annual wellness visit physical examination on November 14, 2022            Musculoskeletal and Injuries    Osteoarthritis (Chronic)    Overview     History of bilateral knee arthritis and bilateral shoulder arthritis status post repair meniscus left and right knee, and injection of Synvisc right knee last injection was approximately 1 year ago with a history of left shoulder replacement and current right shoulder discomfort pain the patient is on PRN ibuprofen         Current Assessment & Plan     Remote left and right knee meniscus repair   Visco plasty right knee  Remote left shoulder arthroplasty  Right shoulder arthritic change         Primary osteoarthritis of left shoulder    Overview     Left shoulder remote arthroplasty         Current Assessment & Plan     Remote left shoulder arthroplasty and overall doing well            Neuro    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         Current Assessment & Plan     Patient is on Keppra 500 mg tablets 1/2 tablet every day the patient has had no seizures in the past 7 years continue the         Ataxia     Overview     Patient is noted and unsteady gait and difficulty with balance in the last week I have he denies recent change in medication except for the addition of Crestor which he started taking after the unsteady gait and balance problem began  The patient denies headache does have some mild vision reduction changes denies hearing change denies vertigo complains of lightheadedness particular with positional change.  The patient has had no falls he denies any specific weakness of extremities other than the fact that he is just unsteady with ambulation         Current Assessment & Plan     Patient's gait is slightly ataxic with associated mild lightheadedness he continues to run a low range blood pressure of 100/70 and no significant changes with sitting supine or standing he is no longer on the lisinopril of 10 mg the patient believes that the Crestor is a potential cause of the patient's ataxia, we will discontinue the Crestor of 10 mg for 2 weeks he is to notify us in 10 days of changes or improvement and if improved will change back to simvastatin.                      Patient Self-Management and Personalized Health Advice  The patient has been provided with information about: prevention of cardiac or vascular disease and preventive services including:   · Annual Wellness Visit (AWV).    Outpatient Encounter Medications as of 11/14/2022   Medication Sig Dispense Refill   • amoxicillin (AMOXIL) 500 MG capsule Take 4 capsules by mouth See Admin Instructions. 4 capsules 1 hour prior to procedure 12 capsule 5   • aspirin (RA Aspirin EC) 81 MG EC tablet Take 1 tablet by mouth Daily. 90 tablet 3   • B Complex Vitamins (VITAMIN B COMPLEX) capsule capsule Take 1 capsule by mouth Daily.     • Cholecalciferol (VITAMIN D3) 5000 units capsule capsule Take 5,000 Units by mouth Daily.     • Coenzyme Q10 (CO Q 10 PO) Take 1 capsule by mouth Daily.     • DHEA 25 MG capsule Take 25 mg by mouth Daily.     • levETIRAcetam  (KEPPRA) 500 MG tablet TAKE 1/2 TABLET BY MOUTH EVERY DAY 45 tablet 3   • meclizine (ANTIVERT) 25 MG tablet Take 1 tablet by mouth 3 (Three) Times a Day As Needed for Dizziness. 25 tablet 2   • metFORMIN (GLUCOPHAGE) 500 MG tablet Take 1 tablet by mouth 2 (Two) Times a Day With Meals. 180 tablet 1   • Multiple Vitamins-Minerals (CENTRUM ADULTS PO) Take 1 tablet by mouth daily.     • Omega-3 Fatty Acids (FISH OIL PO) Take 1 tablet by mouth Daily.     • pantoprazole (PROTONIX) 40 MG EC tablet TAKE 1 TABLET BY MOUTH DAILY 90 tablet 3   • rosuvastatin (CRESTOR) 10 MG tablet Take 1 tablet by mouth Every Night. Hold Crestor for 2 weeks 90 tablet 3   • [DISCONTINUED] rosuvastatin (CRESTOR) 10 MG tablet Take 1 tablet by mouth Every Night. 90 tablet 3   • azithromycin (Zithromax Z-Haile) 250 MG tablet Take 2 tablets by mouth on day 1, then 1 tablet daily on days 2-5 6 tablet 0   • lisinopril (PRINIVIL,ZESTRIL) 10 MG tablet Take 1 tablet by mouth Daily. Hold as of oct 19 90 tablet 3     No facility-administered encounter medications on file as of 11/14/2022.       Reviewed use of high risk medication in the elderly: yes  Reviewed for potential of harmful drug interactions in the elderly: yes    Follow Up:  Return in about 2 weeks (around 11/28/2022) for Recheck.     Patient Instructions   Hold Crestor for 2 weeks  Call office in 10 days  If improved will discontinue Crestor and resume simvastatin or atorvastatin  If not improved will resume Crestor and add therapy to raise blood pressure  Continue all other therapy  Continue walking activity and healthy cardiac diet  Return visit in 2 weeks      An After Visit Summary and PPPS with all of these plans were given to the patient.            Transcribed from ambient dictation for Merrill De La Paz MD by Magalis Ovalle.  11/14/22   10:42 EST    Patient or patient representative verbalized consent to the visit recording.  I have personally performed the services described in this document  as transcribed by the above individual, and it is both accurate and complete.  Merrill De La Paz MD  11/14/2022  17:40 EST  Magalis Ovalle

## 2022-11-14 NOTE — ASSESSMENT & PLAN NOTE
Previously on simvastatin and now on Crestor 10 mg daily the patient states that since beginning the Crestor he has had lightheadedness and mild ataxia the patient's blood pressure remains in low range with no significant positional changes of sitting supine or standing  We will hold Crestor for 2 weeks and have the patient call us in 10 days let us know how he is feeling and if his symptoms have improved if they have improved we will go back to the simvastatin which he tolerated well, if his symptoms have not improved will resume Crestor at 10 mg daily

## 2022-11-30 ENCOUNTER — OFFICE VISIT (OUTPATIENT)
Dept: INTERNAL MEDICINE | Facility: CLINIC | Age: 77
End: 2022-11-30

## 2022-11-30 VITALS
BODY MASS INDEX: 28.25 KG/M2 | WEIGHT: 180 LBS | DIASTOLIC BLOOD PRESSURE: 78 MMHG | HEIGHT: 67 IN | SYSTOLIC BLOOD PRESSURE: 130 MMHG | HEART RATE: 60 BPM

## 2022-11-30 DIAGNOSIS — E11.9 TYPE 2 DIABETES MELLITUS WITHOUT COMPLICATION, WITHOUT LONG-TERM CURRENT USE OF INSULIN: ICD-10-CM

## 2022-11-30 DIAGNOSIS — R27.0 ATAXIA: ICD-10-CM

## 2022-11-30 DIAGNOSIS — E78.5 HYPERLIPIDEMIA LDL GOAL <70: ICD-10-CM

## 2022-11-30 DIAGNOSIS — I10 ESSENTIAL HYPERTENSION: Chronic | ICD-10-CM

## 2022-11-30 DIAGNOSIS — Z95.3 STATUS POST AORTIC VALVE REPLACEMENT WITH BIOPROSTHETIC VALVE: Primary | Chronic | ICD-10-CM

## 2022-11-30 PROCEDURE — 99214 OFFICE O/P EST MOD 30 MIN: CPT | Performed by: INTERNAL MEDICINE

## 2022-11-30 RX ORDER — ROSUVASTATIN CALCIUM 10 MG/1
TABLET, COATED ORAL
Qty: 90 TABLET | Refills: 3
Start: 2022-11-30

## 2022-11-30 NOTE — ASSESSMENT & PLAN NOTE
Patient is ataxic gait is improved originally held the lisinopril because of hypotension and positional postural changes and he improved slightly then we held the Crestor for 2 weeks with marked improvement will resume the Crestor at a reduced dose of 5 mg daily but continue to hold the lisinopril and is to notify us of any changes

## 2022-11-30 NOTE — PROGRESS NOTES
"[facility]     Christian Mcintosh  1945   [id number]      Patient Care Team:  Merrill De La Paz MD as PCP - General (Internal Medicine)  Marshal Spangler IV, MD as Cardiologist (Interventional Cardiology)  Silvio Ahn MD as Consulting Physician (Urology)    Chief Complaint::   Chief Complaint   Patient presents with   • ataxia     2 week follow up - improved off Crestor            HPI    The patient is a 77-year-old male who returns for an office visit with regards to ataxic gait, of which we discontinued Crestor. The patient states he has improved. The patient has a history of hypertension, seizure disorder, diabetes, GERD, and mixed hyperlipidemia with aortic valve replacement.    The patient reports that he is feeling much better since discontinuing Crestor. The patient denies any dizziness. The patient states that his walking is back to normal. The patient notes he has had no falls. He reports that he did not take his medication on Sunday, Monday, and Tuesday, but then slipped up and took it Wednesday or Thursday. The patient reports that he was going to wait until he got all 7 days, and he was going to put it back in the bottle. The patient states that when he got through the week, he had just 5 days of medication. The patient reports that almost immediately after that, he started feeling bad. The patient states that he has been on simvastatin for a long time.     The patient reports that he has a \" flash \" that lasts for about a second. The patient states that this never happens when he is active. The patient notes that it does not affect his eyes. The patient reports that he had an eye exam. The patient states that he is going to have a YAG laser surgery in 01/2023 or 02/2023 for a little skin that has developed on the back of the cataract lens. The patient reports that his blood pressure was 130 systolic. The patient notes that he does not check his blood pressure at home. The " patient states that he is not taking lisinopril. The patient's blood pressure in the exam room today is 118/70 mmHg.     The patient denies any trouble swallowing. The patient reports his lungs are okay. The patient denies any chest, heart skipping/racing, or palpitations.    Patient Active Problem List   Diagnosis   • Coronary artery disease involving native coronary artery of native heart without angina pectoris   • Status post aortic valve replacement with bioprosthetic valve   • Hyperlipidemia LDL goal <70   • Cataracts, bilateral   • Tinnitus   • Essential hypertension   • Type 2 diabetes mellitus without complication, without long-term current use of insulin (HCC)   • Primary osteoarthritis of left shoulder   • Other male erectile dysfunction   • Pain, knee   • Grand mal seizure ([facility])   • Testicular hypofunction   • First degree AV block   • BPH with urinary obstruction   • Chronic GERD   • Primary osteoarthritis of both knees   • Health counseling   • Osteoarthritis   • Secondary osteoarthritis of right shoulder due to rotator cuff tear   • Medicare annual wellness visit, subsequent   • Ataxia   • Medicare annual wellness visit, subsequent        Past Medical History:   Diagnosis Date   • Aortic stenosis    • Atrial flutter ([facility])     RFA by Dr. Padilla   • Benign prostatic hypertrophy w elevated PSA 01/03/2018    BX NO CANCER   • Bicuspid aortic valve    • Cataract    • Coronary artery disease 03/06/2013   • COVID-19 01/17/2022   • Diverticulosis 2002   • Gastric ulcer 2002   • GERD (gastroesophageal reflux disease)    • VQ-Cwyzfvf-Ycbvm tear 07/23/2013   • H/O atrial flutter    • History of basal cell cancer 2007   • History of echocardiogram    • History of rotator cuff tear    • Hyperlipidemia    • Hypertension    • Osteoarthritis 2011   • PONV (postoperative nausea and vomiting)    • Seizure (HCC)    • Skin cancer     skin.  BASAL CELL SKIN CANCER LEFT CHEEK   • Status post shoulder surgery    • Tear of  left rotator cuff    • Tinnitus    • Typical atrial flutter (HCC) 2016    · Diagnosed postoperatively, asymptomatic. · Typical flutter pattern on ECG, 2013. Initiated on Coumadin. · Left atrial appendage excluded with AVR using [place] occluder. · Radiofrequency ablation by Dr. Brooks Padilla, 2013.  · Coumadin initiated following ablation but stopped after development of GI bleeding and a Tarsha-Villeda tear.    • Urinary retention     mcqueen       Past Surgical History:   Procedure Laterality Date   • BASAL CELL CARCINOMA EXCISION     • BONE SPUR ARM     • CARDIAC ABLATION     • CARDIAC CATHETERIZATION  2013   • CARDIAC VALVE REPLACEMENT  2013    bioprosthetic aortic valve   • CARDIAC VALVE REPLACEMENT     • CARPAL TUNNEL RELEASE     • CATARACT EXTRACTION     • COLONOSCOPY     • COLONOSCOPY  2012   • CYST REMOVAL     • CYSTOSCOPY TRANSURETHRAL RESECTION OF PROSTATE N/A 1/3/2018    Procedure: PROSTATE ULTRASOUND AND BIOPSY, CYSTOSCOPY TRANSURETHRAL RESECTION OF PROSTATE GREENLIGHT WITH VAPORIZATION;  Surgeon: Silvio Ahn MD;  Location: [facility];  Service:    • ENDOSCOPY      w/biopsy   • EYE SURGERY      bilateral cataract extraction   • MCQUEEN CATHETER  2016   • HERNIA REPAIR     • KNEE ARTHROSCOPY     • KNEE MENISCAL REPAIR Bilateral    • KNEE SURGERY Left    • LASIK     • PROSTATE BIOPSY  2018   • ROTATOR CUFF REPAIR  10/26/2016   • SHOULDER SURGERY Left    • SKIN CANCER EXCISION  2016    left cheek   • SKIN CANCER EXCISION     • TURP / TRANSURETHRAL INCISION / DRAINAGE PROSTATE     • VASECTOMY         Family History   Problem Relation Age of Onset   • No Known Problems Mother    • Heart attack Father 60   • Coronary artery disease Father    • Heart disease Brother    • Diabetes Brother    • Diabetes Sister    • Other Son         -accident       Social History     Socioeconomic History   • Marital status:    Tobacco Use   •  "Smoking status: Former     Packs/day: 2.00     Years: 23.00     Pack years: 46.00     Types: Cigarettes     Start date: 1963     Quit date:      Years since quittin.9   • Smokeless tobacco: Never   Vaping Use   • Vaping Use: Never used   Substance and Sexual Activity   • Alcohol use: Yes     Alcohol/week: 1.0 standard drink     Types: 1 Shots of liquor per week     Comment: occasional    • Drug use: No   • Sexual activity: Defer       Allergies   Allergen Reactions   • Shellfish-Derived Products Anaphylaxis       Review of Systems   HENT: Negative for hearing loss.    Eyes: Negative.    Respiratory: Negative for cough and wheezing.         Denies recent lung infections.    Cardiovascular: Negative for chest pain and palpitations.        Denies chest pressure.    Gastrointestinal: Negative for abdominal pain, nausea and vomiting.   Musculoskeletal: Negative for arthralgias.        Denies arthritic changes or myalgia.    Neurological: Positive for dizziness, weakness and light-headedness. Negative for syncope and headache.        Denies concussion or neuropathy.    Psychiatric/Behavioral: Negative.        Vital Signs  Vitals:    22 1105   BP: 130/78   BP Location: Left arm   Patient Position: Sitting   Cuff Size: Adult   Pulse: 60   Weight: 81.6 kg (180 lb)   Height: 170.2 cm (67\")   PainSc: 0-No pain     Body mass index is 28.19 kg/m².  BMI is >= 25 and <30. (Overweight) The following options were offered after discussion;: exercise counseling/recommendations and nutrition counseling/recommendations     Advance Care Planning   ACP discussion was held with the patient during this visit. Patient has an advance directive in EMR which is still valid.        Current Outpatient Medications:   •  amoxicillin (AMOXIL) 500 MG capsule, Take 4 capsules by mouth See Admin Instructions. 4 capsules 1 hour prior to procedure, Disp: 12 capsule, Rfl: 5  •  aspirin (RA Aspirin EC) 81 MG EC tablet, Take 1 tablet by " mouth Daily., Disp: 90 tablet, Rfl: 3  •  B Complex Vitamins (VITAMIN B COMPLEX) capsule capsule, Take 1 capsule by mouth Daily., Disp: , Rfl:   •  Cholecalciferol (VITAMIN D3) 5000 units capsule capsule, Take 5,000 Units by mouth Daily., Disp: , Rfl:   •  Coenzyme Q10 (CO Q 10 PO), Take 1 capsule by mouth Daily., Disp: , Rfl:   •  DHEA 25 MG capsule, Take 25 mg by mouth Daily., Disp: , Rfl:   •  levETIRAcetam (KEPPRA) 500 MG tablet, TAKE 1/2 TABLET BY MOUTH EVERY DAY, Disp: 45 tablet, Rfl: 3  •  meclizine (ANTIVERT) 25 MG tablet, Take 1 tablet by mouth 3 (Three) Times a Day As Needed for Dizziness., Disp: 25 tablet, Rfl: 2  •  metFORMIN (GLUCOPHAGE) 500 MG tablet, Take 1 tablet by mouth 2 (Two) Times a Day With Meals., Disp: 180 tablet, Rfl: 1  •  Multiple Vitamins-Minerals (CENTRUM ADULTS PO), Take 1 tablet by mouth daily., Disp: , Rfl:   •  Omega-3 Fatty Acids (FISH OIL PO), Take 1 tablet by mouth Daily., Disp: , Rfl:   •  pantoprazole (PROTONIX) 40 MG EC tablet, TAKE 1 TABLET BY MOUTH DAILY, Disp: 90 tablet, Rfl: 3  •  rosuvastatin (CRESTOR) 10 MG tablet, Take 1/2 daily = 5 mg, Disp: 90 tablet, Rfl: 3    Physical Exam  HENT:      Head:      Comments: A little tenderness sensation on tapping on the face and forehead  Neck:      Vascular: No carotid bruit.      Comments: No masses or thyromegaly.   Cardiovascular:      Rate and Rhythm: Normal rate and regular rhythm.      Pulses: Normal pulses.      Heart sounds: Normal heart sounds.     No friction rub. No gallop.      Comments: Blood pressure is 115/80 and 118/70 mmHg. Blood pressure lying down is 112/70 mmHg. Heart rate stable. The valve sounds okay. No click.   Pulmonary:      Effort: Pulmonary effort is normal.      Breath sounds: Normal breath sounds. No wheezing, rhonchi or rales.   Abdominal:      General: Bowel sounds are normal.      Tenderness: There is no abdominal tenderness.      Comments: Liver and spleen normal.    Musculoskeletal:      Right lower  leg: No edema.      Left lower leg: No edema.      Comments: No significant arthritic changes.    Skin:     General: Skin is warm.   Neurological:      General: No focal deficit present.      Mental Status: He is oriented to person, place, and time.   Psychiatric:         Mood and Affect: Mood normal.         Behavior: Behavior normal.          ACE III MINI              Results Review:    No results found for this or any previous visit (from the past 672 hour(s)).  Procedures    Medication Review: Medications reviewed and noted    Patient wellness counseling  Exercise: Continue walking exercise  Diet: Encouraged healthy cardiac diet  Screening:  Social History     Socioeconomic History   • Marital status:    Tobacco Use   • Smoking status: Former     Packs/day: 2.00     Years: 23.00     Pack years: 46.00     Types: Cigarettes     Start date: 1963     Quit date:      Years since quittin.9   • Smokeless tobacco: Never   Vaping Use   • Vaping Use: Never used   Substance and Sexual Activity   • Alcohol use: Yes     Alcohol/week: 1.0 standard drink     Types: 1 Shots of liquor per week     Comment: occasional    • Drug use: No   • Sexual activity: Defer        Assessment/Plan:    Problem List Items Addressed This Visit        Cardiac and Vasculature    Status post aortic valve replacement with bioprosthetic valve - Primary (Chronic)    Overview     · Echocardiogram (2012): Moderate AS, KLAUDIA of 0.8 cm2.  · Echocardiogram (2013): LVEF 55%, severe AS with mean gradient of 65 mmHg.   · Bioprosthetic aortic valve replacement by Dr. Stuart Griffin (2013): 25-mm Magna Ease with ascending aortoplasty. Left atrial appendage excluded  · Echo (6/15/18): LVEF 48%. Normal functioning bioprosthetic aortic valve. Trivial paravalvular leak.  · Echo (2021): Echo unchanged from 6/15/2018         Current Assessment & Plan     Aortic valve replaced 2013 currently stable continue aspirin          Relevant Medications    amoxicillin (AMOXIL) 500 MG capsule    Essential hypertension (Chronic)    Overview     • Target blood pressure <130/80 mmHg         Current Assessment & Plan       Essential hypertension with initial blood pressure 130/78 repeated 112/70 in the right arm sitting 118/70 in the left arm sitting the patient is no longer on lisinopril 10 mg because of lightheadedness and symptomatic hypotension we will have the patient discontinue lisinopril for the time being         Hyperlipidemia LDL goal <70    Overview     · Statin therapy indicated given presence of coronary artery disease         Current Assessment & Plan       Because the patient's mild unsteadiness ataxic gait and lightheadedness in addition we held the patient's Crestor of 10 mg daily and he is improved we will have the patient take one half of the Crestor 10 or 5 mg daily if he has recurrence of his unsteady gait and will leg weakness and lightheadedness will discontinue and resume the patient's simvastatin which he has taken in the past.  Without problem         Relevant Medications    rosuvastatin (CRESTOR) 10 MG tablet       Endocrine and Metabolic    Type 2 diabetes mellitus without complication, without long-term current use of insulin (HCC)    Overview     Diabetes type 2 on metformin 500 mg daily denies nausea vomiting or abdominal pain or diarrhea continue therapy A1c and CMP are pending         Current Assessment & Plan       Diabetes type 2 on metformin 500 mg twice daily denies nausea vomiting or abdominal pain continue therapy         Relevant Medications    metFORMIN (GLUCOPHAGE) 500 MG tablet       Neuro    Ataxia    Overview     Patient is noted and unsteady gait and difficulty with balance in the last week I have he denies recent change in medication except for the addition of Crestor which he started taking after the unsteady gait and balance problem began  The patient denies headache does have some mild vision  reduction changes denies hearing change denies vertigo complains of lightheadedness particular with positional change.  The patient has had no falls he denies any specific weakness of extremities other than the fact that he is just unsteady with ambulation         Current Assessment & Plan     Patient is ataxic gait is improved originally held the lisinopril because of hypotension and positional postural changes and he improved slightly then we held the Crestor for 2 weeks with marked improvement will resume the Crestor at a reduced dose of 5 mg daily but continue to hold the lisinopril and is to notify us of any changes             Ataxic gait  Stable improved off lisinopril and holding Crestor of 10 mg    Hypertension  Blood pressure stable and the patient is not on any medication.  Currently off lisinopril 10 mg    Mixed hyperlipidemia  The patient had discontinued the Crestor due to dizziness.  We will restart at a lower dose of 5 mg daily     Aortic valve replacement.  The valve sounds are normal on examination.     Ocular   The patient is scheduled for a YAG laser surgery in 01/2023 or 02/2023.      Patient Instructions   Continue to not take lisinopril 10  Check blood pressures twice a day and let us know in 1 week, if greater than 115/80 we will possibly resume lisinopril at a lower dose i.e. 2.5 mg daily  Suggest resume Crestor at half dose or 5 mg daily  Continue all other medications  Call in 1 week and give us the blood pressure numbers  Return visit in 1 month or as needed         Plan of care reviewed with patient at the conclusion of today's visit. Education was provided regarding diagnosis, management, and any prescribed or recommended OTC medications.Patient verbalizes understanding of and agreement with management plan.         Merrill De La Paz MD      Note: Part of this note may be an electronic transcription/translation of spoken language to printed text using the Dragon Dictation  system.    Transcribed from ambient dictation for Merrill De La Paz MD by Avril Cooper.   11/30/22   15:03 EST    Patient or patient representative verbalized consent to the visit recording.  I have personally performed the services described in this document as transcribed by the above individual, and it is both accurate and complete.  Merrill De La Paz MD  11/30/2022  18:05 EST

## 2022-11-30 NOTE — ASSESSMENT & PLAN NOTE
Because the patient's mild unsteadiness ataxic gait and lightheadedness in addition we held the patient's Crestor of 10 mg daily and he is improved we will have the patient take one half of the Crestor 10 or 5 mg daily if he has recurrence of his unsteady gait and will leg weakness and lightheadedness will discontinue and resume the patient's simvastatin which he has taken in the past.  Without problem

## 2022-11-30 NOTE — ASSESSMENT & PLAN NOTE
Essential hypertension with initial blood pressure 130/78 repeated 112/70 in the right arm sitting 118/70 in the left arm sitting the patient is no longer on lisinopril 10 mg because of lightheadedness and symptomatic hypotension we will have the patient discontinue lisinopril for the time being

## 2022-11-30 NOTE — ASSESSMENT & PLAN NOTE
Diabetes type 2 on metformin 500 mg twice daily denies nausea vomiting or abdominal pain continue therapy

## 2022-11-30 NOTE — PATIENT INSTRUCTIONS
Continue to not take lisinopril 10  Check blood pressures twice a day and let us know in 1 week, if greater than 115/80 we will possibly resume lisinopril at a lower dose i.e. 2.5 mg daily  Suggest resume Crestor at half dose or 5 mg daily  Continue all other medications  Call in 1 week and give us the blood pressure numbers  Return visit in 1 month or as needed

## 2022-12-08 ENCOUNTER — TELEPHONE (OUTPATIENT)
Dept: INTERNAL MEDICINE | Facility: CLINIC | Age: 77
End: 2022-12-08

## 2022-12-08 NOTE — TELEPHONE ENCOUNTER
Patient calls to report his blood pressures -     132/87  128/88  132/92  147/84  129/86  137/86  117/76  129/85  133/85  119/71  146/83  146/94  146/84    He checks his BP a couple of times, using different cuffs at home.  The lower numbers (119/71 and 117/76) were taken on the BP cuff at [facility].  Patient reports he feels better taking 1/2 the cholesterol medicine.     Please advise.

## 2022-12-08 NOTE — TELEPHONE ENCOUNTER
Caller: Christian Mcintosh    Relationship: Self    Best call back number: [phone]    What is the best time to reach you: ANYTIME    Who are you requesting to speak with (clinical staff, provider,  specific staff member): KAITLYNN    Do you know the name of the person who called: SELF    What was the call regarding: PATIENT STATES THAT WITH HIS AT HOME BP READINGS HE IS AVERAGING ABOUT 130/82. PATIENT STATES HE WOULD LIKE A CALL FROM KAITLYNN ABOUT HIS BLOOD PRESSURE READINGS.    Do you require a callback: YES

## 2022-12-08 NOTE — TELEPHONE ENCOUNTER
Blood pressures are reviewed and thank they are in adequate control and would suggest continue therapy and note the patient feels improved on one half of the cholesterol statin therapy, continue all current therapy please inform

## 2022-12-29 ENCOUNTER — OFFICE VISIT (OUTPATIENT)
Dept: INTERNAL MEDICINE | Facility: CLINIC | Age: 77
End: 2022-12-29

## 2022-12-29 VITALS
DIASTOLIC BLOOD PRESSURE: 80 MMHG | HEIGHT: 67 IN | BODY MASS INDEX: 28.88 KG/M2 | HEART RATE: 52 BPM | SYSTOLIC BLOOD PRESSURE: 136 MMHG | WEIGHT: 184 LBS

## 2022-12-29 DIAGNOSIS — I10 ESSENTIAL HYPERTENSION: Primary | Chronic | ICD-10-CM

## 2022-12-29 DIAGNOSIS — E78.5 HYPERLIPIDEMIA LDL GOAL <70: ICD-10-CM

## 2022-12-29 PROCEDURE — 99214 OFFICE O/P EST MOD 30 MIN: CPT | Performed by: INTERNAL MEDICINE

## 2022-12-29 RX ORDER — LISINOPRIL 2.5 MG/1
2.5 TABLET ORAL DAILY
Qty: 90 TABLET | Refills: 3 | Status: SHIPPED | OUTPATIENT
Start: 2022-12-29 | End: 2023-01-31

## 2022-12-29 NOTE — PROGRESS NOTES
[facility]     Christian Mcintosh  1945   [id number]      Patient Care Team:  Merrill De La Paz MD as PCP - General (Internal Medicine)  Marshal Spangler IV, MD as Cardiologist (Interventional Cardiology)  Silvio Ahn MD as Consulting Physician (Urology)    Chief Complaint::   Chief Complaint   Patient presents with   • Hypertension     Follow up   • Hyperlipidemia     Follow up            HPI  The patient is a 77-year-old male who presents to office visit for follow-up of hypertension and mixed hyperlipidemia.    The patient reports that he is doing well overall. He notes that he is taking half of Crestor. He adds that he is no longer taking lisinopril. He states that he has been monitoring his blood pressure at home. He notes that his blood pressure at home has been running 130 to 140 systolic and 70 to 80 diastolic. The patient reports that when he goes to the grocery store several times a week to check his blood pressure, it usually runs lower around 115 to 120 systolic and between 70 to 80 diastolic. He denies any headache, dizziness, coughing, wheezing, or shortness of breath. The patient reports that his lower extremities are doing well.    The patient reports that he is taking half a pill of Crestor. He denies any difficulty in pill-splitting. He notes that he has enough to last until his next visit.      Patient Active Problem List   Diagnosis   • Coronary artery disease involving native coronary artery of native heart without angina pectoris   • Status post aortic valve replacement with bioprosthetic valve   • Hyperlipidemia LDL goal <70   • Cataracts, bilateral   • Tinnitus   • Essential hypertension   • Type 2 diabetes mellitus without complication, without long-term current use of insulin ([facility])   • Primary osteoarthritis of left shoulder   • Other male erectile dysfunction   • Pain, knee   • Grand mal seizure ([facility])   • Testicular hypofunction   • First degree AV block   •  BPH with urinary obstruction   • Chronic GERD   • Primary osteoarthritis of both knees   • Health counseling   • Osteoarthritis   • Secondary osteoarthritis of right shoulder due to rotator cuff tear   • Medicare annual wellness visit, subsequent   • Ataxia   • Medicare annual wellness visit, subsequent        Past Medical History:   Diagnosis Date   • Aortic stenosis    • Atrial flutter (HCC)     RFA by Dr. Padilla   • Benign prostatic hypertrophy w elevated PSA 01/03/2018    BX NO CANCER   • Bicuspid aortic valve    • Cataract    • Coronary artery disease 03/06/2013   • COVID-19 01/17/2022   • Diverticulosis 2002   • Gastric ulcer 2002   • GERD (gastroesophageal reflux disease)    • QQ-Pdjkvmw-Ttrjv tear 07/23/2013   • H/O atrial flutter    • History of basal cell cancer 2007   • History of echocardiogram    • History of rotator cuff tear    • Hyperlipidemia    • Hypertension    • Osteoarthritis 2011   • PONV (postoperative nausea and vomiting)    • Seizure (HCC)    • Skin cancer     skin.  BASAL CELL SKIN CANCER LEFT CHEEK   • Status post shoulder surgery    • Tear of left rotator cuff    • Tinnitus    • Typical atrial flutter (HCC) 06/07/2016    · Diagnosed postoperatively, asymptomatic. · Typical flutter pattern on ECG, 05/03/2013. Initiated on Coumadin. · Left atrial appendage excluded with AVR using Tiger Paw occluder. · Radiofrequency ablation by Dr. Brooks Padilla, July 2013.  · Coumadin initiated following ablation but stopped after development of GI bleeding and a Tarsha-Villeda tear.    • Urinary retention     king       Past Surgical History:   Procedure Laterality Date   • BASAL CELL CARCINOMA EXCISION  2007   • BONE SPUR ARM     • CARDIAC ABLATION     • CARDIAC CATHETERIZATION  03/06/2013   • CARDIAC VALVE REPLACEMENT  03/26/2013    bioprosthetic aortic valve   • CARDIAC VALVE REPLACEMENT     • CARPAL TUNNEL RELEASE     • CATARACT EXTRACTION     • COLONOSCOPY     • COLONOSCOPY  08/29/2012   • CYST  REMOVAL     • CYSTOSCOPY TRANSURETHRAL RESECTION OF PROSTATE N/A 1/3/2018    Procedure: PROSTATE ULTRASOUND AND BIOPSY, CYSTOSCOPY TRANSURETHRAL RESECTION OF PROSTATE GREENLIGHT WITH VAPORIZATION;  Surgeon: Silvio Ahn MD;  Location: [facility];  Service:    • ENDOSCOPY      w/biopsy   • EYE SURGERY      bilateral cataract extraction   • MCQUEEN CATHETER  2016   • HERNIA REPAIR     • KNEE ARTHROSCOPY     • KNEE MENISCAL REPAIR Bilateral    • KNEE SURGERY Left    • LASIK     • PROSTATE BIOPSY  2018   • ROTATOR CUFF REPAIR  10/26/2016   • SHOULDER SURGERY Left    • SKIN CANCER EXCISION  2016    left cheek   • SKIN CANCER EXCISION     • TURP / TRANSURETHRAL INCISION / DRAINAGE PROSTATE     • VASECTOMY         Family History   Problem Relation Age of Onset   • No Known Problems Mother    • Heart attack Father 60   • Coronary artery disease Father    • Heart disease Brother    • Diabetes Brother    • Diabetes Sister    • Other Son         -accident       Social History     Socioeconomic History   • Marital status:    Tobacco Use   • Smoking status: Former     Packs/day: 2.00     Years: 23.00     Pack years: 46.00     Types: Cigarettes     Start date: 1963     Quit date:      Years since quittin.0   • Smokeless tobacco: Never   Vaping Use   • Vaping Use: Never used   Substance and Sexual Activity   • Alcohol use: Yes     Alcohol/week: 1.0 standard drink     Types: 1 Shots of liquor per week     Comment: occasional    • Drug use: No   • Sexual activity: Defer       Allergies   Allergen Reactions   • Shellfish-Derived Products Anaphylaxis       Review of Systems     HEENT: Denies any hearing changes, eyesight changes, no headache or dizziness  NECK: Denies any pain, stiffness, swelling or dysphagia  CHEST: Denies cough or wheeze or recent lung infections  CARDIAC: Denies chest pain, pressure or palpitations  ABD: Denies nausea, vomiting or abdominal pain  : Denies  "dysuria  NEURO: Denies syncope, concussion, neuropathy  PSYCH: Denies any stress  EXTREM: Denies arthritic changes myalgia or arthralgia  SKIN: Denies any rash    Vital Signs  Vitals:    12/29/22 0846   BP: 136/80   BP Location: Left arm   Patient Position: Sitting   Cuff Size: Adult   Pulse: 52   Weight: 83.5 kg (184 lb)   Height: 170.2 cm (67\")   PainSc: 0-No pain     Body mass index is 28.82 kg/m².  BMI is >= 25 and <30. (Overweight) The following options were offered after discussion;: nutrition counseling/recommendations     Advance Care Planning   ACP discussion was held with the patient during this visit. Patient has an advance directive in EMR which is still valid.        Current Outpatient Medications:   •  aspirin (RA Aspirin EC) 81 MG EC tablet, Take 1 tablet by mouth Daily., Disp: 90 tablet, Rfl: 3  •  B Complex Vitamins (VITAMIN B COMPLEX) capsule capsule, Take 1 capsule by mouth Daily., Disp: , Rfl:   •  Cholecalciferol (VITAMIN D3) 5000 units capsule capsule, Take 5,000 Units by mouth Daily., Disp: , Rfl:   •  Coenzyme Q10 (CO Q 10 PO), Take 1 capsule by mouth Daily., Disp: , Rfl:   •  DHEA 25 MG capsule, Take 25 mg by mouth Daily., Disp: , Rfl:   •  levETIRAcetam (KEPPRA) 500 MG tablet, TAKE 1/2 TABLET BY MOUTH EVERY DAY, Disp: 45 tablet, Rfl: 3  •  metFORMIN (GLUCOPHAGE) 500 MG tablet, Take 1 tablet by mouth 2 (Two) Times a Day With Meals., Disp: 180 tablet, Rfl: 1  •  Multiple Vitamins-Minerals (CENTRUM ADULTS PO), Take 1 tablet by mouth daily., Disp: , Rfl:   •  Omega-3 Fatty Acids (FISH OIL PO), Take 1 tablet by mouth Daily., Disp: , Rfl:   •  pantoprazole (PROTONIX) 40 MG EC tablet, TAKE 1 TABLET BY MOUTH DAILY, Disp: 90 tablet, Rfl: 3  •  rosuvastatin (CRESTOR) 10 MG tablet, Take 1/2 daily = 5 mg, Disp: 90 tablet, Rfl: 3  •  amoxicillin (AMOXIL) 500 MG capsule, Take 4 capsules by mouth See Admin Instructions. 4 capsules 1 hour prior to procedure, Disp: 12 capsule, Rfl: 5  •  lisinopril " (PRINIVIL,ZESTRIL) 2.5 MG tablet, Take 1 tablet by mouth Daily., Disp: 90 tablet, Rfl: 3    Physical Exam     ACE III MINI       HEENT: Pupils equal reactive ENT clear, no facial asymmetry, pharynx is clear  NECK: No masses bruit or thyromegaly  CHEST: Clear without rales wheezes or rhonchi  CARDIAC: Regular rhythm without gallop rub or click, blood pressure heart rate stable  ABD: Liver spleen normal, good bowel sounds, nontender  : Deferred  NEURO: Intact  PSYCH: Normal  EXTREM: No significant arthritic changes, no edema  SKIN: Clear     Results Review:    No results found for this or any previous visit (from the past 672 hour(s)).  Procedures    Medication Review: Medications reviewed and noted    Patient wellness counseling  Exercise: Continue ADL exercising  Diet: Healthy cardiac diabetic diet  Screening:  Social History     Socioeconomic History   • Marital status:    Tobacco Use   • Smoking status: Former     Packs/day: 2.00     Years: 23.00     Pack years: 46.00     Types: Cigarettes     Start date: 1963     Quit date:      Years since quittin.0   • Smokeless tobacco: Never   Vaping Use   • Vaping Use: Never used   Substance and Sexual Activity   • Alcohol use: Yes     Alcohol/week: 1.0 standard drink     Types: 1 Shots of liquor per week     Comment: occasional    • Drug use: No   • Sexual activity: Defer        Assessment/Plan:    Problem List Items Addressed This Visit        Cardiac and Vasculature    Essential hypertension - Primary (Chronic)    Overview     • Target blood pressure <130/80 mmHg         Current Assessment & Plan       Patient has been off lisinopril and blood pressures at home have been 130/70 140/80 in the office this day 136/80 with heart rate of 52, will resume lisinopril at 2.5 mg daily         Relevant Medications    lisinopril (PRINIVIL,ZESTRIL) 2.5 MG tablet    Hyperlipidemia LDL goal <70    Overview     · Statin therapy indicated given presence of coronary  artery disease         Current Assessment & Plan       Mixed hyperlipidemia with recent myalgia soreness legs and the patient's Crestor was discontinued we restarted the Crestor at 5 mg from 10 and the patient is having no difficulty with the 5 mg and will continue Crestor 5 mg daily         Relevant Medications    rosuvastatin (CRESTOR) 10 MG tablet        Patient Instructions   Continue Crestor 5 mg or one half of the 10  Resume exercise  Start lisinopril 2.5 mg daily-previously on 10  Continue healthy cardiac diet  Resume exercise  Continue all other medications  Return visit in 4 months we will obtain lab at that time       Plan of care reviewed with patient at the conclusion of today's visit. Education was provided regarding diagnosis, management, and any prescribed or recommended OTC medications.Patient verbalizes understanding of and agreement with management plan.         Merrill De La Paz MD      Note: Part of this note may be an electronic transcription/translation of spoken language to printed text using the Dragon Dictation system.    Transcribed from ambient dictation for Merrill De La Paz MD by Magalis Ovalle.  12/29/22   10:42 EST    Patient or patient representative verbalized consent to the visit recording.  I have personally performed the services described in this document as transcribed by the above individual, and it is both accurate and complete.  Merrill De La Paz MD  12/29/2022  17:10 EST  Magalis Ovalle

## 2022-12-29 NOTE — ASSESSMENT & PLAN NOTE
Patient has been off lisinopril and blood pressures at home have been 130/70 140/80 in the office this day 136/80 with heart rate of 52, will resume lisinopril at 2.5 mg daily

## 2022-12-29 NOTE — ASSESSMENT & PLAN NOTE
Mixed hyperlipidemia with recent myalgia soreness legs and the patient's Crestor was discontinued we restarted the Crestor at 5 mg from 10 and the patient is having no difficulty with the 5 mg and will continue Crestor 5 mg daily

## 2022-12-29 NOTE — ASSESSMENT & PLAN NOTE
Diabetes type 2 on metformin 500 mg twice daily denies nausea vomiting or abdominal pain constipation or diarrhea continue therapy

## 2023-02-07 ENCOUNTER — PATIENT MESSAGE (OUTPATIENT)
Dept: INTERNAL MEDICINE | Facility: CLINIC | Age: 78
End: 2023-02-07
Payer: MEDICARE

## 2023-02-07 NOTE — TELEPHONE ENCOUNTER
From: Christian Mcintosh  To: Merrill De La Paz  Sent: 2/7/2023 5:00 PM EST  Subject: Question regarding COVID-19    Don’t understand results. Do I have Covid? i

## 2023-02-07 NOTE — TELEPHONE ENCOUNTER
I called patient.  He tested positive at a [facility] visit yesterday.  We discussed these results and reviewed quarantine recommendations.

## 2023-04-06 RX ORDER — PANTOPRAZOLE SODIUM 40 MG/1
40 TABLET, DELAYED RELEASE ORAL DAILY
Qty: 90 TABLET | Refills: 3 | Status: SHIPPED | OUTPATIENT
Start: 2023-04-06 | End: 2023-04-12 | Stop reason: SDUPTHER

## 2023-04-06 RX ORDER — LEVETIRACETAM 500 MG/1
TABLET ORAL
Qty: 45 TABLET | Refills: 3 | Status: SHIPPED | OUTPATIENT
Start: 2023-04-06 | End: 2023-04-12 | Stop reason: SDUPTHER

## 2023-04-12 RX ORDER — PANTOPRAZOLE SODIUM 40 MG/1
40 TABLET, DELAYED RELEASE ORAL DAILY
Qty: 90 TABLET | Refills: 3 | Status: SHIPPED | OUTPATIENT
Start: 2023-04-12

## 2023-04-12 RX ORDER — LEVETIRACETAM 500 MG/1
250 TABLET ORAL DAILY
Qty: 45 TABLET | Refills: 3 | Status: SHIPPED | OUTPATIENT
Start: 2023-04-12

## 2023-04-12 NOTE — TELEPHONE ENCOUNTER
Caller: Christian Mcintosh    Relationship: Self    Best call back number: [phone]    Requested Prescriptions:   Requested Prescriptions     Pending Prescriptions Disp Refills   • levETIRAcetam (KEPPRA) 500 MG tablet 45 tablet 3     Sig: Take 0.5 tablets by mouth Daily.   • pantoprazole (PROTONIX) 40 MG EC tablet 90 tablet 3     Sig: Take 1 tablet by mouth Daily.        Pharmacy where request should be sent: [id number] [place] [id number] AT [facility] [address] - [phone]  - [phone] FX     Last office visit with prescribing clinician: 12/29/2022   Last telemedicine visit with prescribing clinician: 4/26/2023   Next office visit with prescribing clinician: 4/26/2023     Additional details provided by patient: PATIENT WILL BE OUT OF MEDICATION NEXT WEEK    Does the patient have less than a 3 day supply:  [] Yes  [x] No    Would you like a call back once the refill request has been completed: [] Yes [x] No    If the office needs to give you a call back, can they leave a voicemail: [] Yes [x] No    Eric Rosales Rep   04/12/23 16:07 EDT

## 2023-04-12 NOTE — TELEPHONE ENCOUNTER
Caller: Christian Mcintosh    Relationship: Self    Best call back number: [phone]    What orders are you requesting (i.e. lab or imaging): 6 MONTH LABS    In what timeframe would the patient need to come in: 04/17/2023    Where will you receive your lab/imaging services: IN OFFICE    Additional notes: PATIENT HAS APPOINTMENT ON 04/26/2023 HOWEVER WILL BE IN [place]OWN ON 04/17/2023 AND WOULD LIKE TO GET LABS DRAWN. PLEASE CALL PATIENT WHEN ORDERS HAVE BEEN PLACED.

## 2023-04-17 ENCOUNTER — LAB (OUTPATIENT)
Dept: LAB | Facility: HOSPITAL | Age: 78
End: 2023-04-17
Payer: MEDICARE

## 2023-04-17 DIAGNOSIS — E11.9 TYPE 2 DIABETES MELLITUS WITHOUT COMPLICATION, WITHOUT LONG-TERM CURRENT USE OF INSULIN: Primary | ICD-10-CM

## 2023-04-17 DIAGNOSIS — E11.9 TYPE 2 DIABETES MELLITUS WITHOUT COMPLICATION, WITHOUT LONG-TERM CURRENT USE OF INSULIN: ICD-10-CM

## 2023-04-18 LAB
ALBUMIN SERPL-MCNC: 4.7 G/DL (ref 3.5–5.2)
ALBUMIN/GLOB SERPL: 2 G/DL
ALP SERPL-CCNC: 73 U/L (ref 39–117)
ALT SERPL-CCNC: 17 U/L (ref 1–41)
AST SERPL-CCNC: 24 U/L (ref 1–40)
BILIRUB SERPL-MCNC: 0.4 MG/DL (ref 0–1.2)
BUN SERPL-MCNC: 20 MG/DL (ref 8–23)
BUN/CREAT SERPL: 17.4 (ref 7–25)
CALCIUM SERPL-MCNC: 10.1 MG/DL (ref 8.6–10.5)
CHLORIDE SERPL-SCNC: 107 MMOL/L (ref 98–107)
CO2 SERPL-SCNC: 27.8 MMOL/L (ref 22–29)
CREAT SERPL-MCNC: 1.15 MG/DL (ref 0.76–1.27)
EGFRCR SERPLBLD CKD-EPI 2021: 66.4 ML/MIN/1.73
GLOBULIN SER CALC-MCNC: 2.4 GM/DL
GLUCOSE SERPL-MCNC: 116 MG/DL (ref 65–99)
HBA1C MFR BLD: 6.5 % (ref 4.8–5.6)
POTASSIUM SERPL-SCNC: 4.8 MMOL/L (ref 3.5–5.2)
PROT SERPL-MCNC: 7.1 G/DL (ref 6–8.5)
SODIUM SERPL-SCNC: 143 MMOL/L (ref 136–145)

## 2023-04-26 ENCOUNTER — TELEPHONE (OUTPATIENT)
Dept: INTERNAL MEDICINE | Facility: CLINIC | Age: 78
End: 2023-04-26

## 2023-04-26 ENCOUNTER — OFFICE VISIT (OUTPATIENT)
Dept: INTERNAL MEDICINE | Facility: CLINIC | Age: 78
End: 2023-04-26
Payer: MEDICARE

## 2023-04-26 VITALS
SYSTOLIC BLOOD PRESSURE: 110 MMHG | DIASTOLIC BLOOD PRESSURE: 70 MMHG | BODY MASS INDEX: 28.41 KG/M2 | HEART RATE: 52 BPM | WEIGHT: 181 LBS | HEIGHT: 67 IN

## 2023-04-26 DIAGNOSIS — E78.5 HYPERLIPIDEMIA LDL GOAL <70: ICD-10-CM

## 2023-04-26 DIAGNOSIS — K21.9 CHRONIC GERD: ICD-10-CM

## 2023-04-26 DIAGNOSIS — I10 ESSENTIAL HYPERTENSION: Primary | Chronic | ICD-10-CM

## 2023-04-26 DIAGNOSIS — Z95.3 STATUS POST AORTIC VALVE REPLACEMENT WITH BIOPROSTHETIC VALVE: Chronic | ICD-10-CM

## 2023-04-26 PROCEDURE — 3074F SYST BP LT 130 MM HG: CPT | Performed by: INTERNAL MEDICINE

## 2023-04-26 PROCEDURE — 3044F HG A1C LEVEL LT 7.0%: CPT | Performed by: INTERNAL MEDICINE

## 2023-04-26 PROCEDURE — 3078F DIAST BP <80 MM HG: CPT | Performed by: INTERNAL MEDICINE

## 2023-04-26 PROCEDURE — 1159F MED LIST DOCD IN RCRD: CPT | Performed by: INTERNAL MEDICINE

## 2023-04-26 PROCEDURE — 99214 OFFICE O/P EST MOD 30 MIN: CPT | Performed by: INTERNAL MEDICINE

## 2023-04-26 PROCEDURE — 1160F RVW MEDS BY RX/DR IN RCRD: CPT | Performed by: INTERNAL MEDICINE

## 2023-04-26 RX ORDER — LISINOPRIL 2.5 MG/1
TABLET ORAL
Start: 2023-04-26 | End: 2023-04-26 | Stop reason: SINTOL

## 2023-04-26 RX ORDER — LISINOPRIL 10 MG/1
10 TABLET ORAL DAILY
Status: SHIPPED
Start: 2023-04-26 | End: 2023-04-26

## 2023-04-26 NOTE — PROGRESS NOTES
[facility]     Christian Mcintosh  1945   [id number]      Patient Care Team:  Merrill De La Paz MD as PCP - General (Internal Medicine)  Marshal Spangler IV, MD as Cardiologist (Interventional Cardiology)  Silvio Ahn MD as Consulting Physician (Urology)    Chief Complaint::   Chief Complaint   Patient presents with   • Hypertension     6 mo follow up   • balance is off     X 2 weeks.  Occas dizziness but not constant.  Denies fall   • Med Refill     Amoxil that he takes prior to dental work   • s/p covid infection     Feb 2023        HPI  The patient is a 77-year-old male with a recent COVID-19 infection in 02/2023. He needs a medication refill on amoxicillin that he takes for dental work and complains of occasional dizziness and essential hypertension. He has GERD and mixed hyperlipidemia.    The patient reports that he had another episode of dizziness 2 to 3 weeks ago. He states that he got up and started to go to the bathroom and bounced off the wall again. He currently experiences dizziness and issues with balance when he gets up. He reports that he is more active and played golf last Friday, 4/21/2023. He denies any problems with leaning over to  objectsp. He admits after YAG that he is unable to tell a difference in his vision. He believes it is deteriorating. The patient denies any coughing, wheezing, shortness of breath or residual from having COVID-19. He denies taking antihistamine with his dizzy spells. The patient currently is expereincing lightheadedness. He reports ringing in his ears and denies hearing loss.    The patient reports taking lisinopril 2.5 mg.       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 Padilla   • Benign prostatic hypertrophy w elevated PSA 01/03/2018    BX NO CANCER   • Bicuspid aortic valve    • Cataract    • Coronary artery disease 03/06/2013   • COVID-19 01/17/2022   • Diverticulosis 2002   • Gastric ulcer 2002   • GERD (gastroesophageal reflux disease)    • IL-Yivhsgl-Uejkq tear 07/23/2013   • H/O atrial flutter    • History of basal cell cancer 2007   • History of echocardiogram    • History of rotator cuff tear    • Hyperlipidemia    • Hypertension    • Osteoarthritis 2011   • PONV (postoperative nausea and vomiting)    • Seizure    • Skin cancer     skin.  BASAL CELL SKIN CANCER LEFT CHEEK   • Status post shoulder surgery    • Tear of left rotator cuff    • Tinnitus    • Typical atrial flutter 06/07/2016    · Diagnosed postoperatively, asymptomatic. · Typical flutter pattern on ECG, 05/03/2013. Initiated on Coumadin. · Left atrial appendage excluded with AVR using Tiger Paw occluder. · Radiofrequency ablation by Dr. Brooks Padilla, July 2013.  · Coumadin initiated following ablation but stopped after development of GI bleeding and a Tarsha-Villeda tear.    • Urinary retention     king       Past Surgical History:   Procedure Laterality Date   • BASAL CELL CARCINOMA EXCISION  2007   • BONE SPUR ARM     • CARDIAC ABLATION     •  CARDIAC CATHETERIZATION  2013   • CARDIAC VALVE REPLACEMENT  2013    bioprosthetic aortic valve   • CARDIAC VALVE REPLACEMENT     • CARPAL TUNNEL RELEASE     • CATARACT EXTRACTION     • COLONOSCOPY     • COLONOSCOPY  2012   • CYST REMOVAL     • CYSTOSCOPY TRANSURETHRAL RESECTION OF PROSTATE N/A 1/3/2018    Procedure: PROSTATE ULTRASOUND AND BIOPSY, CYSTOSCOPY TRANSURETHRAL RESECTION OF PROSTATE GREENLIGHT WITH VAPORIZATION;  Surgeon: Silvio Ahn MD;  Location: [facility];  Service:    • ENDOSCOPY      w/biopsy   • EYE SURGERY      bilateral cataract extraction   • MCQUEEN CATHETER  2016   • HERNIA REPAIR     • KNEE ARTHROSCOPY     • KNEE MENISCAL REPAIR Bilateral    • KNEE SURGERY Left    • LASIK     • PROSTATE BIOPSY  2018   • ROTATOR CUFF REPAIR  10/26/2016   • SHOULDER SURGERY Left    • SKIN CANCER EXCISION  2016    left cheek   • SKIN CANCER EXCISION     • TURP / TRANSURETHRAL INCISION / DRAINAGE PROSTATE     • VASECTOMY         Family History   Problem Relation Age of Onset   • No Known Problems Mother    • Heart attack Father 60   • Coronary artery disease Father    • Heart disease Brother    • Diabetes Brother    • Diabetes Sister    • Other Son         Car accident       Social History     Socioeconomic History   • Marital status:    Tobacco Use   • Smoking status: Former     Packs/day: 2.00     Years: 23.00     Pack years: 46.00     Types: Cigarettes     Start date: 1963     Quit date: 1986     Years since quittin.3   • Smokeless tobacco: Never   Vaping Use   • Vaping Use: Never used   Substance and Sexual Activity   • Alcohol use: Yes     Alcohol/week: 1.0 standard drink     Types: 1 Shots of liquor per week     Comment: occasional    • Drug use: No   • Sexual activity: Not Currently       Allergies   Allergen Reactions   • Shellfish-Derived Products Anaphylaxis       Review of Systems     HEENT: Denies any hearing loss, eyesight  "changes, no headache, complains of dizziness, lightheadedness, and ringing  NECK: Denies any pain, stiffness, swelling or dysphagia  CHEST: Denies cough or wheeze or recent lung infections  CARDIAC: Denies chest pain, pressure or palpitations  ABD: Denies nausea, vomiting or abdominal pain  : Denies dysuria  NEURO: Denies syncope, concussion, neuropathy  PSYCH: Denies any stress  EXTREM: Denies arthritic changes myalgia or arthralgia  SKIN: Denies any rash    Vital Signs  Vitals:    04/26/23 0843   BP: 110/70   BP Location: Right arm   Patient Position: Sitting   Cuff Size: Adult   Pulse: 52   Weight: 82.1 kg (181 lb)   Height: 170.2 cm (67\")   PainSc: 0-No pain     Body mass index is 28.35 kg/m².  BMI is >= 25 and <30. (Overweight) The following options were offered after discussion;: nutrition counseling/recommendations     Advance Care Planning   ACP discussion was held with the patient during this visit. Patient has an advance directive (not in EMR), copy requested.       Current Outpatient Medications:   •  aspirin (RA Aspirin EC) 81 MG EC tablet, Take 1 tablet by mouth Daily., Disp: 90 tablet, Rfl: 3  •  B Complex Vitamins (VITAMIN B COMPLEX) capsule capsule, Take 1 capsule by mouth Daily., Disp: , Rfl:   •  Cholecalciferol (VITAMIN D3) 5000 units capsule capsule, Take 1 capsule by mouth Daily., Disp: , Rfl:   •  Coenzyme Q10 (CO Q 10 PO), Take 1 capsule by mouth Daily., Disp: , Rfl:   •  DHEA 25 MG capsule, Take 25 mg by mouth Daily., Disp: , Rfl:   •  levETIRAcetam (KEPPRA) 500 MG tablet, Take 0.5 tablets by mouth Daily., Disp: 45 tablet, Rfl: 3  •  metFORMIN (GLUCOPHAGE) 500 MG tablet, Take 1 tablet by mouth 2 (Two) Times a Day With Meals., Disp: 180 tablet, Rfl: 1  •  Multiple Vitamins-Minerals (CENTRUM ADULTS PO), Take 1 tablet by mouth Daily., Disp: , Rfl:   •  Omega-3 Fatty Acids (FISH OIL PO), Take 1 tablet by mouth Daily., Disp: , Rfl:   •  pantoprazole (PROTONIX) 40 MG EC tablet, Take 1 tablet by " mouth Daily., Disp: 90 tablet, Rfl: 3  •  promethazine-dextromethorphan (PROMETHAZINE-DM) 6.25-15 MG/5ML syrup, Take 5 mL by mouth 4 (Four) Times a Day As Needed for Cough., Disp: 118 mL, Rfl: 0  •  rosuvastatin (CRESTOR) 10 MG tablet, Take 1/2 daily = 5 mg, Disp: 90 tablet, Rfl: 3    Physical Exam     ACE III MINI         HEENT: Pupils equal reactive ENT clear, no facial asymmetry, pharynx is clear  NECK: No masses bruit or thyromegaly  CHEST: Clear without rales wheezes or rhonchi  CARDIAC: Regular rythym, murmur unchanged, blood pressure is 104/60 mmHg and 104/60 mmHg, heart rate stable  ABD: Liver spleen normal, good bowel sounds, nontender  : Deferred  NEURO: Intact  PSYCH: Normal  EXTREM: No significant arthritic changes, no edema  SKIN: Clear     Results Review:    Recent Results (from the past 672 hour(s))   Hemoglobin A1c    Collection Time: 04/17/23  8:20 AM    Specimen: Blood    Blood  Release to ganga   Result Value Ref Range    Hemoglobin A1C 6.50 (H) 4.80 - 5.60 %   Comprehensive Metabolic Panel    Collection Time: 04/17/23  8:20 AM    Specimen: Blood    Blood  Release to ganga   Result Value Ref Range    Glucose 116 (H) 65 - 99 mg/dL    BUN 20 8 - 23 mg/dL    Creatinine 1.15 0.76 - 1.27 mg/dL    EGFR Result 66.4 >60.0 mL/min/1.73    BUN/Creatinine Ratio 17.4 7.0 - 25.0    Sodium 143 136 - 145 mmol/L    Potassium 4.8 3.5 - 5.2 mmol/L    Chloride 107 98 - 107 mmol/L    Total CO2 27.8 22.0 - 29.0 mmol/L    Calcium 10.1 8.6 - 10.5 mg/dL    Total Protein 7.1 6.0 - 8.5 g/dL    Albumin 4.7 3.5 - 5.2 g/dL    Globulin 2.4 gm/dL    A/G Ratio 2.0 g/dL    Total Bilirubin 0.4 0.0 - 1.2 mg/dL    Alkaline Phosphatase 73 39 - 117 U/L    AST (SGOT) 24 1 - 40 U/L    ALT (SGPT) 17 1 - 41 U/L     Procedures CMP and A1c discussed    Medication Review: Medications reviewed and noted    Patient wellness counseling  Exercise: Continue ADL and walking exercise  Diet: Healthy cardiac diet  Screening: CMP and A1c  discussed  Social History     Socioeconomic History   • Marital status:    Tobacco Use   • Smoking status: Former     Packs/day: 2.00     Years: 23.00     Pack years: 46.00     Types: Cigarettes     Start date: 1963     Quit date: 1986     Years since quittin.3   • Smokeless tobacco: Never   Vaping Use   • Vaping Use: Never used   Substance and Sexual Activity   • Alcohol use: Yes     Alcohol/week: 1.0 standard drink     Types: 1 Shots of liquor per week     Comment: occasional    • Drug use: No   • Sexual activity: Not Currently        Assessment/Plan:    Problem List Items Addressed This Visit        Cardiac and Vasculature    Status post aortic valve replacement with bioprosthetic valve (Chronic)    Overview     · Echocardiogram (2012): Moderate AS, KLAUDIA of 0.8 cm2.  · Echocardiogram (2013): LVEF 55%, severe AS with mean gradient of 65 mmHg.   · Bioprosthetic aortic valve replacement by Dr. Stuart Griffin (2013): 25-mm Magna Ease with ascending aortoplasty. Left atrial appendage excluded  · Echo (6/15/18): LVEF 48%. Normal functioning bioprosthetic aortic valve. Trivial paravalvular leak.  · Echo (2021): Echo unchanged from 6/15/2018         Current Assessment & Plan      remote aortic valve replacement 2013 currently stable denies chest pain palpitations on aspirin 81 and Crestor 5 mg daily continue therapy         Essential hypertension - Primary (Chronic)    Overview     • Target blood pressure <130/80 mmHg         Current Assessment & Plan       Essential hypertension with blood pressure 110/70 and heart rate of 52 the patient is status gradual reduction of his lisinopril and is currently on 2.5 mg daily he does have some positional change in his lightheaded with mobilization he is having no difficulty playing golf or with exertion  Because the chart said the patient was taking 10 mg daily we have asked the patient to return home and to notify us of the  specific dose which is 2.5 mg of lisinopril daily and have asked the patient to discontinue lisinopril 2.5 mg at this time.         Hyperlipidemia LDL goal <70    Overview     · Statin therapy indicated given presence of coronary artery disease         Current Assessment & Plan       Hyperlipidemia on Crestor 5 mg daily denies myalgia arthralgia continue therapy         Relevant Medications    rosuvastatin (CRESTOR) 10 MG tablet       Gastrointestinal Abdominal     Chronic GERD    Overview     History of chronic GERD and gastritis on Protonix 40 mg daily         Current Assessment & Plan     GERD esophagitis on pantoprazole 40 mg daily continue therapy         Relevant Medications    pantoprazole (PROTONIX) 40 MG EC tablet        Patient Instructions   Please call and let us know the specific dose of lisinopril either at 2.5 or 10  Stop lisinopril temporarily  Continue other medication  Recent lab work noted and discussed with improved blood sugar and A1c down to 6.5  Return visit in 1 week  The patient has returned a call and is indeed taking lisinopril 2.5 mg daily we have asked him to discontinue this therapy and will reassess in 1 week       Plan of care reviewed with patient at the conclusion of today's visit. Education was provided regarding diagnosis, management, and any prescribed or recommended OTC medications.Patient verbalizes understanding of and agreement with management plan.         Merrill De La Paz MD      Note: Part of this note may be an electronic transcription/translation of spoken language to printed text using the Dragon Dictation system.    Transcribed from ambient dictation for Merrill De La Paz MD by Magalis Ovalle.  04/26/23   10:56 EDT    Patient or patient representative verbalized consent to the visit recording.  I have personally performed the services described in this document as transcribed by the above individual, and it is both accurate and complete.  Merrill De La Paz MD  4/26/2023  17:28  EDT

## 2023-04-26 NOTE — TELEPHONE ENCOUNTER
Noted the patient is taking the 2.5 mg of lisinopril, and because of the low range blood pressure suggest to discontinue the 2.5 lisinopril and will follow the patient in 1 week please inform

## 2023-04-26 NOTE — PATIENT INSTRUCTIONS
Please call and let us know the specific dose of lisinopril either at 2.5 or 10  Stop lisinopril temporarily  Continue other medication  Recent lab work noted and discussed with improved blood sugar and A1c down to 6.5  Return visit in 1 week  The patient has returned a call and is indeed taking lisinopril 2.5 mg daily we have asked him to discontinue this therapy and will reassess in 1 week

## 2023-04-26 NOTE — ASSESSMENT & PLAN NOTE
Essential hypertension with blood pressure 110/70 and heart rate of 52 the patient is status gradual reduction of his lisinopril and is currently on 2.5 mg daily he does have some positional change in his lightheaded with mobilization he is having no difficulty playing golf or with exertion  Because the chart said the patient was taking 10 mg daily we have asked the patient to return home and to notify us of the specific dose which is 2.5 mg of lisinopril daily and have asked the patient to discontinue lisinopril 2.5 mg at this time.

## 2023-04-26 NOTE — TELEPHONE ENCOUNTER
Caller: Christian Mcintosh    Relationship: Self    Best call back number: [phone]    Who are you requesting to speak with (clinical staff, provider,  specific staff member): CLINICAL    What was the call regarding: PATIENT REPORTING THEY TAKE 2.5MG OF LISINOPRIL    Do you require a callback: NO

## 2023-04-26 NOTE — ASSESSMENT & PLAN NOTE
remote aortic valve replacement March 2013 currently stable denies chest pain palpitations on aspirin 81 and Crestor 5 mg daily continue therapy

## 2023-05-04 ENCOUNTER — OFFICE VISIT (OUTPATIENT)
Dept: INTERNAL MEDICINE | Facility: CLINIC | Age: 78
End: 2023-05-04
Payer: MEDICARE

## 2023-05-04 VITALS
HEIGHT: 67 IN | BODY MASS INDEX: 28.41 KG/M2 | HEART RATE: 64 BPM | SYSTOLIC BLOOD PRESSURE: 130 MMHG | WEIGHT: 181 LBS | DIASTOLIC BLOOD PRESSURE: 70 MMHG

## 2023-05-04 DIAGNOSIS — E78.5 HYPERLIPIDEMIA LDL GOAL <70: ICD-10-CM

## 2023-05-04 DIAGNOSIS — E11.9 TYPE 2 DIABETES MELLITUS WITHOUT COMPLICATION, WITHOUT LONG-TERM CURRENT USE OF INSULIN: ICD-10-CM

## 2023-05-04 DIAGNOSIS — I10 ESSENTIAL HYPERTENSION: Primary | Chronic | ICD-10-CM

## 2023-05-04 DIAGNOSIS — K21.9 CHRONIC GERD: ICD-10-CM

## 2023-05-04 DIAGNOSIS — H61.23 BILATERAL IMPACTED CERUMEN: ICD-10-CM

## 2023-05-04 NOTE — PATIENT INSTRUCTIONS
Continue no lisinopril 2.5 mg with blood pressure and symptomatic lightheadedness improved  Generic Debrox is placed in each ear with a cotton pledget to be irrigated at home  Continue all medications  Follow-up with cardiology  Return visit for annual wellness in 6 months or as needed

## 2023-05-04 NOTE — ASSESSMENT & PLAN NOTE
Generic Debrox is placed and cotton pledget is placed to keep the Debrox in place irrigate when home if not improved repeat in 24 hours

## 2023-05-04 NOTE — ASSESSMENT & PLAN NOTE
Patient's blood pressure is improved no longer on lisinopril 2.5 mg daily with current blood pressure 130/70 and heart rate of 64  Patient's symptoms of lightheadedness and positional change blood pressure have improved since no longer on lisinopril 2.5 which has been gradually reduced from 10 mg, continue therapy follow-up with cardiology

## 2023-05-04 NOTE — PROGRESS NOTES
[facility]     Christian Mcintosh  1945   [id number]      Patient Care Team:  Merrill De La Paz MD as PCP - General (Internal Medicine)  Marshal Spangler IV, MD as Cardiologist (Interventional Cardiology)  Silvio Ahn MD as Consulting Physician (Urology)    Chief Complaint::   Chief Complaint   Patient presents with   • Hypertension     1 week follow up - patient states he feels better            HPI  The patient is a 77-year-old male with recent hypotension. We discontinued his lisinopril 2.5 mg, and he returned today. He states he does feel better, and his blood pressure is up to 130/70 mmHg with an irregular heart rate of 64 bpm.    The patient confirms that he discontinue taking the lisinopril 2.5 mg, and now he feels better. He has been moving around well. He feels significantly better in the morning. He does not feel well when he lies down. His blood pressure today is 120/70 mmHg. He has a scheduled appointment with Dr. Marshal Spangler on 06/14/2023.     The patient denies having any headaches, double vision, blurry vision, or allergy drainage. The pollen does not aggravate him. He has been doing outdoor chores and notes that he is sensitive to dust. He denies any coughing, wheezing, or palpitations.     The patient has tried cleaning both his ears and notes that he could not get anything out of his ear left or right He has been experiencing constant bilateral tinnitus.       Patient Active Problem List   Diagnosis   • Coronary artery disease involving native coronary artery of native heart without angina pectoris   • Status post aortic valve replacement with bioprosthetic valve   • Hyperlipidemia LDL goal <70   • Cataracts, bilateral   • Tinnitus   • Essential hypertension   • Type 2 diabetes mellitus without complication, without long-term current use of insulin ([facility])   • Primary osteoarthritis of left shoulder   • Other male erectile dysfunction   • Pain, knee   • Grand mal  seizure   • Testicular hypofunction   • First degree AV block   • BPH with urinary obstruction   • Chronic GERD   • Primary osteoarthritis of both knees   • Health counseling   • Osteoarthritis   • Secondary osteoarthritis of right shoulder due to rotator cuff tear   • Medicare annual wellness visit, subsequent   • Ataxia   • Medicare annual wellness visit, subsequent   • Bilateral impacted cerumen        Past Medical History:   Diagnosis Date   • Aortic stenosis    • Atrial flutter     RFA by Dr. Padilla   • Benign prostatic hypertrophy w elevated PSA 01/03/2018    BX NO CANCER   • Bicuspid aortic valve    • Cataract    • Coronary artery disease 03/06/2013   • COVID-19 01/17/2022   • Diverticulosis 2002   • Gastric ulcer 2002   • GERD (gastroesophageal reflux disease)    • GA-Mqnaxck-Axugq tear 07/23/2013   • H/O atrial flutter    • History of basal cell cancer 2007   • History of echocardiogram    • History of rotator cuff tear    • Hyperlipidemia    • Hypertension    • Osteoarthritis 2011   • PONV (postoperative nausea and vomiting)    • Seizure    • Skin cancer     skin.  BASAL CELL SKIN CANCER LEFT CHEEK   • Status post shoulder surgery    • Tear of left rotator cuff    • Tinnitus    • Typical atrial flutter 06/07/2016    · Diagnosed postoperatively, asymptomatic. · Typical flutter pattern on ECG, 05/03/2013. Initiated on Coumadin. · Left atrial appendage excluded with AVR using Tiger Paw occluder. · Radiofrequency ablation by Dr. Brooks Padilla, July 2013.  · Coumadin initiated following ablation but stopped after development of GI bleeding and a Tarsha-Villeda tear.    • Urinary retention     king       Past Surgical History:   Procedure Laterality Date   • BASAL CELL CARCINOMA EXCISION  2007   • BONE SPUR ARM     • CARDIAC ABLATION     • CARDIAC CATHETERIZATION  03/06/2013   • CARDIAC VALVE REPLACEMENT  03/26/2013    bioprosthetic aortic valve   • CARDIAC VALVE REPLACEMENT     • CARPAL TUNNEL RELEASE     •  CATARACT EXTRACTION     • COLONOSCOPY     • COLONOSCOPY  2012   • CYST REMOVAL     • CYSTOSCOPY TRANSURETHRAL RESECTION OF PROSTATE N/A 1/3/2018    Procedure: PROSTATE ULTRASOUND AND BIOPSY, CYSTOSCOPY TRANSURETHRAL RESECTION OF PROSTATE GREENLIGHT WITH VAPORIZATION;  Surgeon: Silvio Ahn MD;  Location: [facility];  Service:    • ENDOSCOPY      w/biopsy   • EYE SURGERY      bilateral cataract extraction   • MCQUEEN CATHETER  2016   • HERNIA REPAIR     • KNEE ARTHROSCOPY     • KNEE MENISCAL REPAIR Bilateral    • KNEE SURGERY Left    • LASIK     • PROSTATE BIOPSY  2018   • ROTATOR CUFF REPAIR  10/26/2016   • SHOULDER SURGERY Left    • SKIN CANCER EXCISION  2016    left cheek   • SKIN CANCER EXCISION     • TURP / TRANSURETHRAL INCISION / DRAINAGE PROSTATE     • VASECTOMY         Family History   Problem Relation Age of Onset   • No Known Problems Mother    • Heart attack Father 60   • Coronary artery disease Father    • Heart disease Brother    • Diabetes Brother    • Diabetes Sister    • Other Son         Car accident       Social History     Socioeconomic History   • Marital status:    Tobacco Use   • Smoking status: Former     Packs/day: 2.00     Years: 23.00     Pack years: 46.00     Types: Cigarettes     Start date: 1963     Quit date: 1986     Years since quittin.3   • Smokeless tobacco: Never   Vaping Use   • Vaping Use: Never used   Substance and Sexual Activity   • Alcohol use: Yes     Alcohol/week: 1.0 standard drink     Types: 1 Shots of liquor per week     Comment: occasional    • Drug use: No   • Sexual activity: Not Currently       Allergies   Allergen Reactions   • Shellfish-Derived Products Anaphylaxis       Review of Systems     HEENT: Denies any hearing changes, eyesight changes, no headache or dizziness complains of reduced hearing and possible wax  NECK: Denies any pain, stiffness, swelling, or dysphagia  CHEST: Denies cough or wheeze or  "recent lung infections  CARDIAC: Denies chest pain, pressure, or palpitations  ABD: Denies nausea, vomiting, or abdominal pain  : Denies dysuria  NEURO: Denies syncope, concussion, neuropathy  PSYCH: Denies any stress  EXTREM: Denies arthritic changes myalgia or arthralgia  SKIN: Denies any rash    Vital Signs  Vitals:    05/04/23 1336   BP: 130/70   BP Location: Left arm   Patient Position: Sitting   Cuff Size: Adult   Pulse: 64  Comment: sl irregular   Weight: 82.1 kg (181 lb)   Height: 170.2 cm (67\")   PainSc: 0-No pain     Body mass index is 28.35 kg/m².  BMI is >= 25 and <30. (Overweight) The following options were offered after discussion;: exercise counseling/recommendations and nutrition counseling/recommendations     Advance Care Planning   ACP discussion was held with the patient during this visit. Patient has an advance directive (not in EMR), copy requested.       Current Outpatient Medications:   •  aspirin (RA Aspirin EC) 81 MG EC tablet, Take 1 tablet by mouth Daily., Disp: 90 tablet, Rfl: 3  •  B Complex Vitamins (VITAMIN B COMPLEX) capsule capsule, Take 1 capsule by mouth Daily., Disp: , Rfl:   •  Cholecalciferol (VITAMIN D3) 5000 units capsule capsule, Take 1 capsule by mouth Daily., Disp: , Rfl:   •  Coenzyme Q10 (CO Q 10 PO), Take 1 capsule by mouth Daily., Disp: , Rfl:   •  DHEA 25 MG capsule, Take 25 mg by mouth Daily., Disp: , Rfl:   •  levETIRAcetam (KEPPRA) 500 MG tablet, Take 0.5 tablets by mouth Daily., Disp: 45 tablet, Rfl: 3  •  metFORMIN (GLUCOPHAGE) 500 MG tablet, Take 1 tablet by mouth 2 (Two) Times a Day With Meals., Disp: 180 tablet, Rfl: 1  •  Multiple Vitamins-Minerals (CENTRUM ADULTS PO), Take 1 tablet by mouth Daily., Disp: , Rfl:   •  Omega-3 Fatty Acids (FISH OIL PO), Take 1 tablet by mouth Daily., Disp: , Rfl:   •  pantoprazole (PROTONIX) 40 MG EC tablet, Take 1 tablet by mouth Daily., Disp: 90 tablet, Rfl: 3  •  rosuvastatin (CRESTOR) 10 MG tablet, Take 1/2 daily = 5 mg, " Disp: 90 tablet, Rfl: 3    Physical Exam     ACE III MINI      HEENT: Pupils equal reactive ENT clear, no facial asymmetry, the pharynx is clear. Dry wax at the bottom of both ears.  NECK: No masses, bruit, or thyromegaly.  CHEST: Clear without rales wheezes or rhonchi  CARDIAC: Regular rhythm without gallop rub or click, blood pressure stable, heart rate is irregular.   ABD: Liver spleen normal, good bowel sounds, nontender  : Deferred  NEURO: Intact  PSYCH: Normal  EXTREM: No significant arthritic changes, no edema  SKIN: Clear     Results Review:    Recent Results (from the past 672 hour(s))   Hemoglobin A1c    Collection Time: 04/17/23  8:20 AM    Specimen: Blood    Blood  Release to ganga   Result Value Ref Range    Hemoglobin A1C 6.50 (H) 4.80 - 5.60 %   Comprehensive Metabolic Panel    Collection Time: 04/17/23  8:20 AM    Specimen: Blood    Blood  Release to ganga   Result Value Ref Range    Glucose 116 (H) 65 - 99 mg/dL    BUN 20 8 - 23 mg/dL    Creatinine 1.15 0.76 - 1.27 mg/dL    EGFR Result 66.4 >60.0 mL/min/1.73    BUN/Creatinine Ratio 17.4 7.0 - 25.0    Sodium 143 136 - 145 mmol/L    Potassium 4.8 3.5 - 5.2 mmol/L    Chloride 107 98 - 107 mmol/L    Total CO2 27.8 22.0 - 29.0 mmol/L    Calcium 10.1 8.6 - 10.5 mg/dL    Total Protein 7.1 6.0 - 8.5 g/dL    Albumin 4.7 3.5 - 5.2 g/dL    Globulin 2.4 gm/dL    A/G Ratio 2.0 g/dL    Total Bilirubin 0.4 0.0 - 1.2 mg/dL    Alkaline Phosphatase 73 39 - 117 U/L    AST (SGOT) 24 1 - 40 U/L    ALT (SGPT) 17 1 - 41 U/L     Procedures generic Debrox placed in both ears    Medication Review: Medications reviewed and noted    Patient wellness counseling  Exercise: Encouraged active ADL and cardiac walking exercise  Diet: Healthy cardiac diet  Screening:  Social History     Socioeconomic History   • Marital status:    Tobacco Use   • Smoking status: Former     Packs/day: 2.00     Years: 23.00     Pack years: 46.00     Types: Cigarettes     Start date: 11/5/1963      Quit date: 1986     Years since quittin.3   • Smokeless tobacco: Never   Vaping Use   • Vaping Use: Never used   Substance and Sexual Activity   • Alcohol use: Yes     Alcohol/week: 1.0 standard drink     Types: 1 Shots of liquor per week     Comment: occasional    • Drug use: No   • Sexual activity: Not Currently        Assessment/Plan:    Problem List Items Addressed This Visit        Cardiac and Vasculature    Essential hypertension - Primary (Chronic)    Overview     • Target blood pressure <130/80 mmHg         Current Assessment & Plan       Patient's blood pressure is improved no longer on lisinopril 2.5 mg daily with current blood pressure 130/70 and heart rate of 64  Patient's symptoms of lightheadedness and positional change blood pressure have improved since no longer on lisinopril 2.5 which has been gradually reduced from 10 mg, continue therapy follow-up with cardiology         Hyperlipidemia LDL goal <70    Overview     · Statin therapy indicated given presence of coronary artery disease         Current Assessment & Plan       Mixed hyperlipidemia on Crestor 10 mg taking half tablet of 5 mg daily continue therapy denies myalgia arthralgia         Relevant Medications    rosuvastatin (CRESTOR) 10 MG tablet       ENT    Bilateral impacted cerumen    Overview     Bilateral cerumen impaction,  Generic Debrox is placed in both ears with cotton pledget  Advised the patient to irrigate both ears on arrival at home  If not improved to repeat in 24 hours         Current Assessment & Plan     Generic Debrox is placed and cotton pledget is placed to keep the Debrox in place irrigate when home if not improved repeat in 24 hours            Endocrine and Metabolic    Type 2 diabetes mellitus without complication, without long-term current use of insulin (HCC)    Overview     Diabetes type 2 on metformin 500 mg daily denies nausea vomiting or abdominal pain or diarrhea continue therapy A1c and CMP are  pending         Current Assessment & Plan       Diabetes type 2 on metformin 500 mg twice daily continue therapy         Relevant Medications    metFORMIN (GLUCOPHAGE) 500 MG tablet       Gastrointestinal Abdominal     Chronic GERD    Overview     History of chronic GERD and gastritis on Protonix 40 mg daily         Current Assessment & Plan     Chronic GERD gastritis on pantoprazole 40 mg daily continue therapy         Relevant Medications    pantoprazole (PROTONIX) 40 MG EC tablet        Patient Instructions   Continue no lisinopril 2.5 mg with blood pressure and symptomatic lightheadedness improved  Generic Debrox is placed in each ear with a cotton pledget to be irrigated at home  Continue all medications  Follow-up with cardiology  Return visit for annual wellness in 6 months or as needed         Plan of care reviewed with patient at the conclusion of today's visit. Education was provided regarding diagnosis, management, and any prescribed or recommended OTC medications.Patient verbalizes understanding of and agreement with management plan.      Transcribed from ambient dictation for Merrill De La Paz MD by Tawanna Meade.  05/04/23   17:09 EDT    Patient or patient representative verbalized consent to the visit recording.  I have personally performed the services described in this document as transcribed by the above individual, and it is both accurate and complete.  Merrill De La Paz MD  5/4/2023  17:58 EDT

## 2023-05-04 NOTE — ASSESSMENT & PLAN NOTE
Mixed hyperlipidemia on Crestor 10 mg taking half tablet of 5 mg daily continue therapy denies myalgia arthralgia

## 2023-05-24 ENCOUNTER — EXTERNAL PBMM DATA (OUTPATIENT)
Dept: PHARMACY | Facility: OTHER | Age: 78
End: 2023-05-24
Payer: MEDICARE

## 2023-06-13 NOTE — PROGRESS NOTES
Cardiology Outpatient Visit      Identification: Christian Mcintosh is a 78 y.o. male who resides in [place], [place].     Reason for visit:  Prosthetic aortic valve  Mild CAD  Hyperlipidemia      Subjective      Mr. Mcintosh returns to the office today for routine 1 year follow-up.  He is doing well.  Last fall, he had an episode of symptomatic low blood pressure.  He saw Dr. De La Paz to temporarily discontinued both lisinopril and rosuvastatin.  After the patient had recovered, both medicines were restarted at reduced doses.  He has had no further symptomatic low blood pressures and is feeling well.    Continues to exercise and play golf on occasion.    Review of Systems   Constitutional: Negative for malaise/fatigue.   Eyes:  Negative for vision loss in left eye and vision loss in right eye.   Cardiovascular:  Negative for chest pain, dyspnea on exertion, near-syncope, orthopnea, palpitations, paroxysmal nocturnal dyspnea and syncope.   Musculoskeletal:  Negative for myalgias.   Neurological:  Negative for brief paralysis, excessive daytime sleepiness, focal weakness, numbness, paresthesias and weakness.   All other systems reviewed and are negative.    Allergies   Allergen Reactions    Shellfish-Derived Products Anaphylaxis         Current Outpatient Medications   Medication Instructions    aspirin 81 mg, Oral, Daily    B Complex Vitamins (VITAMIN B COMPLEX) capsule capsule 1 capsule, Oral, Daily    Coenzyme Q10 (CO Q 10 PO) 1 capsule, Oral, Daily    DHEA 25 mg, Oral, Daily    levETIRAcetam (KEPPRA) 250 mg, Oral, Daily    lisinopril (PRINIVIL,ZESTRIL) 2.5 mg, Oral, Daily    metFORMIN (GLUCOPHAGE) 500 MG tablet TAKE 1 TABLET BY MOUTH TWICE DAILY WITH MEALS    Multiple Vitamins-Minerals (CENTRUM ADULTS PO) 1 tablet, Oral, Daily    Omega-3 Fatty Acids (FISH OIL PO) 1 tablet, Oral, Daily    pantoprazole (PROTONIX) 40 mg, Oral, Daily    rosuvastatin (CRESTOR) 5 mg, Oral, Nightly    vitamin D3 5,000 Units, Oral, Daily  "        Objective     /78 (BP Location: Right arm, Patient Position: Sitting)   Pulse 57   Ht 167.6 cm (66\")   Wt 82.5 kg (181 lb 12.8 oz)   SpO2 96%   BMI 29.34 kg/m²       Constitutional:       Appearance: Healthy appearance.   Eyes:      General: No scleral icterus.  Neck:      Thyroid: No thyroid mass.      Vascular: No carotid bruit or JVD. JVD normal.   Pulmonary:      Effort: Pulmonary effort is normal.      Breath sounds: Normal breath sounds.   Cardiovascular:      Normal rate. Regular rhythm.      Murmurs: There is no murmur.      No gallop.    Edema:     Peripheral edema absent.   Skin:     General: Skin is warm. There is no cyanosis.   Neurological:      General: No focal deficit present.      Mental Status: Alert.   Psychiatric:         Attention and Perception: Attention normal.       Result Review  (reviewed with patient):            Lab Results   Component Value Date    GLUCOSE 116 (H) 04/17/2023    BUN 20 04/17/2023    CREATININE 1.15 04/17/2023    EGFRRESULT 66.4 04/17/2023    EGFR 84 06/05/2016    BCR 17.4 04/17/2023    K 4.8 04/17/2023    CO2 27.8 04/17/2023    CALCIUM 10.1 04/17/2023    PROTENTOTREF 7.1 04/17/2023    ALBUMIN 4.7 04/17/2023    BILITOT 0.4 04/17/2023    AST 24 04/17/2023    ALT 17 04/17/2023     Lab Results   Component Value Date    WBC 7.40 10/19/2022    HGB 14.9 10/19/2022    HCT 45.2 10/19/2022    MCV 89.2 10/19/2022     10/19/2022     Lab Results   Component Value Date    CHOL 140 10/08/2019    CHLPL 178 10/19/2022    TRIG 175 (H) 10/19/2022    HDL 39 (L) 10/19/2022     (H) 10/19/2022     Lab Results   Component Value Date    HGBA1C 6.50 (H) 04/17/2023           Assessment     Diagnoses and all orders for this visit:    1. Status post aortic valve replacement with bioprosthetic valve (Primary)  Overview:  Echocardiogram (08/08/2012): Moderate AS, KLAUDIA of 0.8 cm2.  Echocardiogram (02/26/2013): LVEF 55%, severe AS with mean gradient of 65 mmHg. "   Bioprosthetic aortic valve replacement by Dr. Stuart Griffin (03/26/2013): 25-mm Magna Ease with ascending aortoplasty. Left atrial appendage excluded  Echo (6/15/18): LVEF 48%. Normal functioning bioprosthetic aortic valve. Trivial paravalvular leak.  Echo (4/23/2021): Echo unchanged from 6/15/2018    Assessment & Plan:  No murmur on exam today  Repeat surveillance echo next year prior to office visit  Continue aspirin  SBE prophylaxis prior to dental procedure    Orders:  -     aspirin 81 MG EC tablet; Take 1 tablet by mouth Daily.  Dispense: 90 tablet; Refill: 3  -     Adult Transthoracic Echo Complete W/ Cont if Necessary Per Protocol; Future    2. Coronary artery disease involving native coronary artery of native heart without angina pectoris  Overview:  Cardiac catheterization (03/06/2013): mild CAD    Assessment & Plan:  No angina  Continue low-dose aspirin and statin therapy     Orders:  -     aspirin 81 MG EC tablet; Take 1 tablet by mouth Daily.  Dispense: 90 tablet; Refill: 3    3. Hyperlipidemia LDL goal <70  Overview:  Statin therapy indicated given presence of coronary artery disease    Assessment & Plan:  Statin dose recently decreased by Dr. De La Paz  Prescribed rosuvastatin 5 mg tablet today    Orders:  -     rosuvastatin (CRESTOR) 5 MG tablet; Take 1 tablet by mouth Every Night.  Dispense: 90 tablet; Refill: 3    4. Type 2 diabetes mellitus without complication, without long-term current use of insulin  Overview:  Well-controlled  Continue metformin and lisinopril    Orders:  -     lisinopril (PRINIVIL,ZESTRIL) 2.5 MG tablet; Take 1 tablet by mouth Daily.  Dispense: 90 tablet; Refill: 3          Plan   Continue present medical therapy  Echo in 1 year prior to office visit      Follow-up   Return in about 1 year (around 6/14/2024).        Baljinder Spangler MD, FAC, [facility]  6/14/2023

## 2023-06-14 ENCOUNTER — OFFICE VISIT (OUTPATIENT)
Dept: CARDIOLOGY | Facility: CLINIC | Age: 78
End: 2023-06-14
Payer: MEDICARE

## 2023-06-14 VITALS
BODY MASS INDEX: 29.22 KG/M2 | HEIGHT: 66 IN | OXYGEN SATURATION: 96 % | HEART RATE: 57 BPM | WEIGHT: 181.8 LBS | SYSTOLIC BLOOD PRESSURE: 110 MMHG | DIASTOLIC BLOOD PRESSURE: 78 MMHG

## 2023-06-14 DIAGNOSIS — I25.10 CORONARY ARTERY DISEASE INVOLVING NATIVE CORONARY ARTERY OF NATIVE HEART WITHOUT ANGINA PECTORIS: ICD-10-CM

## 2023-06-14 DIAGNOSIS — E78.5 HYPERLIPIDEMIA LDL GOAL <70: ICD-10-CM

## 2023-06-14 DIAGNOSIS — Z95.3 STATUS POST AORTIC VALVE REPLACEMENT WITH BIOPROSTHETIC VALVE: Primary | Chronic | ICD-10-CM

## 2023-06-14 DIAGNOSIS — E11.9 TYPE 2 DIABETES MELLITUS WITHOUT COMPLICATION, WITHOUT LONG-TERM CURRENT USE OF INSULIN: ICD-10-CM

## 2023-06-14 RX ORDER — ROSUVASTATIN CALCIUM 5 MG/1
5 TABLET, COATED ORAL NIGHTLY
Qty: 90 TABLET | Refills: 3 | Status: SHIPPED | OUTPATIENT
Start: 2023-06-14

## 2023-06-14 RX ORDER — ASPIRIN 81 MG/1
81 TABLET ORAL DAILY
Qty: 90 TABLET | Refills: 3
Start: 2023-06-14

## 2023-06-14 RX ORDER — LISINOPRIL 2.5 MG/1
2.5 TABLET ORAL DAILY
Qty: 90 TABLET | Refills: 3 | Status: SHIPPED | OUTPATIENT
Start: 2023-06-14

## 2023-06-14 RX ORDER — LISINOPRIL 2.5 MG/1
2.5 TABLET ORAL DAILY
COMMUNITY
End: 2023-06-14 | Stop reason: SDUPTHER

## 2023-06-14 NOTE — ASSESSMENT & PLAN NOTE
No murmur on exam today  Repeat surveillance echo next year prior to office visit  Continue aspirin  SBE prophylaxis prior to dental procedure

## 2023-08-24 ENCOUNTER — EXTERNAL PBMM DATA (OUTPATIENT)
Dept: PHARMACY | Facility: OTHER | Age: 78
End: 2023-08-24
Payer: MEDICARE

## 2023-09-14 ENCOUNTER — POP HEALTH PHARMACY (OUTPATIENT)
Dept: PHARMACY | Facility: OTHER | Age: 78
End: 2023-09-14
Payer: MEDICARE

## 2023-10-27 ENCOUNTER — TELEPHONE (OUTPATIENT)
Dept: INTERNAL MEDICINE | Facility: CLINIC | Age: 78
End: 2023-10-27

## 2023-10-27 NOTE — TELEPHONE ENCOUNTER
Caller: Christian Mcintosh    Relationship: Self    Best call back number:[phone]     What orders are you requesting (i.e. lab or imaging): ANNUAL BLOOD WORK    In what timeframe would the patient need to come in: ASAP        Additional notes: PATIENT HAS A WELLNESS APPT ON 11/16/23 AND WANTS TO GET BLOOD WORK PRIOR. CALL PATIENT ONCE SUBMITTED

## 2023-10-29 DIAGNOSIS — E11.9 TYPE 2 DIABETES MELLITUS WITHOUT COMPLICATION, WITHOUT LONG-TERM CURRENT USE OF INSULIN: Primary | ICD-10-CM

## 2023-10-29 DIAGNOSIS — I10 ESSENTIAL HYPERTENSION: ICD-10-CM

## 2023-10-29 DIAGNOSIS — E78.5 HYPERLIPIDEMIA LDL GOAL <70: ICD-10-CM

## 2023-10-29 DIAGNOSIS — Z12.5 PROSTATE CANCER SCREENING: ICD-10-CM

## 2023-11-06 ENCOUNTER — LAB (OUTPATIENT)
Dept: LAB | Facility: HOSPITAL | Age: 78
End: 2023-11-06
Payer: MEDICARE

## 2023-11-06 DIAGNOSIS — E11.9 TYPE 2 DIABETES MELLITUS WITHOUT COMPLICATION, WITHOUT LONG-TERM CURRENT USE OF INSULIN: ICD-10-CM

## 2023-11-06 DIAGNOSIS — E78.5 HYPERLIPIDEMIA LDL GOAL <70: ICD-10-CM

## 2023-11-06 DIAGNOSIS — Z12.5 PROSTATE CANCER SCREENING: ICD-10-CM

## 2023-11-06 DIAGNOSIS — I10 ESSENTIAL HYPERTENSION: ICD-10-CM

## 2023-11-07 LAB
ALBUMIN SERPL-MCNC: 4.8 G/DL (ref 3.5–5.2)
ALBUMIN/CREAT UR: 6 MG/G CREAT (ref 0–29)
ALBUMIN/GLOB SERPL: 2.5 G/DL
ALP SERPL-CCNC: 76 U/L (ref 39–117)
ALT SERPL-CCNC: 16 U/L (ref 1–41)
AST SERPL-CCNC: 23 U/L (ref 1–40)
BASOPHILS # BLD AUTO: 0.05 10*3/MM3 (ref 0–0.2)
BASOPHILS NFR BLD AUTO: 0.8 % (ref 0–1.5)
BILIRUB SERPL-MCNC: 0.3 MG/DL (ref 0–1.2)
BUN SERPL-MCNC: 19 MG/DL (ref 8–23)
BUN/CREAT SERPL: 20.4 (ref 7–25)
CALCIUM SERPL-MCNC: 9.6 MG/DL (ref 8.6–10.5)
CHLORIDE SERPL-SCNC: 107 MMOL/L (ref 98–107)
CHOLEST SERPL-MCNC: 140 MG/DL (ref 0–200)
CO2 SERPL-SCNC: 26 MMOL/L (ref 22–29)
CREAT SERPL-MCNC: 0.93 MG/DL (ref 0.76–1.27)
CREAT UR-MCNC: 255.5 MG/DL
EGFRCR SERPLBLD CKD-EPI 2021: 85.1 ML/MIN/1.73
EOSINOPHIL # BLD AUTO: 0.55 10*3/MM3 (ref 0–0.4)
EOSINOPHIL NFR BLD AUTO: 8.4 % (ref 0.3–6.2)
ERYTHROCYTE [DISTWIDTH] IN BLOOD BY AUTOMATED COUNT: 12.2 % (ref 12.3–15.4)
GLOBULIN SER CALC-MCNC: 1.9 GM/DL
GLUCOSE SERPL-MCNC: 129 MG/DL (ref 65–99)
HBA1C MFR BLD: 6.9 % (ref 4.8–5.6)
HCT VFR BLD AUTO: 43.7 % (ref 37.5–51)
HDLC SERPL-MCNC: 36 MG/DL (ref 40–60)
HGB BLD-MCNC: 14.5 G/DL (ref 13–17.7)
IMM GRANULOCYTES # BLD AUTO: 0.02 10*3/MM3 (ref 0–0.05)
IMM GRANULOCYTES NFR BLD AUTO: 0.3 % (ref 0–0.5)
LDLC SERPL CALC-MCNC: 74 MG/DL (ref 0–100)
LYMPHOCYTES # BLD AUTO: 2.47 10*3/MM3 (ref 0.7–3.1)
LYMPHOCYTES NFR BLD AUTO: 37.8 % (ref 19.6–45.3)
MCH RBC QN AUTO: 29.2 PG (ref 26.6–33)
MCHC RBC AUTO-ENTMCNC: 33.2 G/DL (ref 31.5–35.7)
MCV RBC AUTO: 88.1 FL (ref 79–97)
MICROALBUMIN UR-MCNC: 14.5 UG/ML
MONOCYTES # BLD AUTO: 0.58 10*3/MM3 (ref 0.1–0.9)
MONOCYTES NFR BLD AUTO: 8.9 % (ref 5–12)
NEUTROPHILS # BLD AUTO: 2.86 10*3/MM3 (ref 1.7–7)
NEUTROPHILS NFR BLD AUTO: 43.8 % (ref 42.7–76)
NRBC BLD AUTO-RTO: 0 /100 WBC (ref 0–0.2)
PLATELET # BLD AUTO: 209 10*3/MM3 (ref 140–450)
POTASSIUM SERPL-SCNC: 4.3 MMOL/L (ref 3.5–5.2)
PROT SERPL-MCNC: 6.7 G/DL (ref 6–8.5)
PSA SERPL-MCNC: 0.87 NG/ML (ref 0–4)
RBC # BLD AUTO: 4.96 10*6/MM3 (ref 4.14–5.8)
SODIUM SERPL-SCNC: 142 MMOL/L (ref 136–145)
TRIGL SERPL-MCNC: 173 MG/DL (ref 0–150)
TSH SERPL DL<=0.005 MIU/L-ACNC: 2.86 UIU/ML (ref 0.27–4.2)
VLDLC SERPL CALC-MCNC: 30 MG/DL (ref 5–40)
WBC # BLD AUTO: 6.53 10*3/MM3 (ref 3.4–10.8)

## 2023-11-16 ENCOUNTER — OFFICE VISIT (OUTPATIENT)
Dept: INTERNAL MEDICINE | Facility: CLINIC | Age: 78
End: 2023-11-16
Payer: MEDICARE

## 2023-11-16 VITALS
DIASTOLIC BLOOD PRESSURE: 80 MMHG | BODY MASS INDEX: 29.57 KG/M2 | SYSTOLIC BLOOD PRESSURE: 114 MMHG | HEIGHT: 66 IN | WEIGHT: 184 LBS | HEART RATE: 60 BPM

## 2023-11-16 DIAGNOSIS — E78.5 HYPERLIPIDEMIA LDL GOAL <70: ICD-10-CM

## 2023-11-16 DIAGNOSIS — M75.101 SECONDARY OSTEOARTHRITIS OF RIGHT SHOULDER DUE TO ROTATOR CUFF TEAR: ICD-10-CM

## 2023-11-16 DIAGNOSIS — I10 ESSENTIAL HYPERTENSION: Chronic | ICD-10-CM

## 2023-11-16 DIAGNOSIS — Z00.00 MEDICARE ANNUAL WELLNESS VISIT, SUBSEQUENT: Primary | ICD-10-CM

## 2023-11-16 DIAGNOSIS — M17.0 PRIMARY OSTEOARTHRITIS OF BOTH KNEES: ICD-10-CM

## 2023-11-16 DIAGNOSIS — Z95.3 STATUS POST AORTIC VALVE REPLACEMENT WITH BIOPROSTHETIC VALVE: Chronic | ICD-10-CM

## 2023-11-16 DIAGNOSIS — K21.9 CHRONIC GERD: ICD-10-CM

## 2023-11-16 DIAGNOSIS — E11.9 TYPE 2 DIABETES MELLITUS WITHOUT COMPLICATION, WITHOUT LONG-TERM CURRENT USE OF INSULIN: ICD-10-CM

## 2023-11-16 DIAGNOSIS — M15.9 PRIMARY OSTEOARTHRITIS INVOLVING MULTIPLE JOINTS: Chronic | ICD-10-CM

## 2023-11-16 DIAGNOSIS — M19.211 SECONDARY OSTEOARTHRITIS OF RIGHT SHOULDER DUE TO ROTATOR CUFF TEAR: ICD-10-CM

## 2023-11-16 DIAGNOSIS — I44.0 FIRST DEGREE AV BLOCK: ICD-10-CM

## 2023-11-16 DIAGNOSIS — G40.409 GRAND MAL SEIZURE: ICD-10-CM

## 2023-11-16 NOTE — PROGRESS NOTES
QUICK REFERENCE INFORMATION:  The ABCs of the Annual Wellness Visit    Subsequent Medicare Wellness Visit    HEALTH RISK ASSESSMENT    1945    Recent Hospitalizations:  No hospitalization(s) within the last year..        Current Medical Providers:  Patient Care Team:  Merrill De La Paz MD as PCP - General (Internal Medicine)  Marshal Spangler IV, MD as Cardiologist (Interventional Cardiology)  Silvio Ahn MD as Consulting Physician (Urology)        Smoking Status:  Social History     Tobacco Use   Smoking Status Former    Packs/day: 2.00    Years: 23.00    Additional pack years: 0.00    Total pack years: 46.00    Types: Cigarettes    Start date: 1963    Quit date: 1986    Years since quittin.8   Smokeless Tobacco Never       Alcohol Consumption:  Social History     Substance and Sexual Activity   Alcohol Use Yes    Alcohol/week: 1.0 standard drink of alcohol    Types: 1 Shots of liquor per week    Comment: occasional        Depression Screen:       2023     9:44 AM   PHQ-2/PHQ-9 Depression Screening   Little Interest or Pleasure in Doing Things 1-->several days   Feeling Down, Depressed or Hopeless 0-->not at all   PHQ-9: Brief Depression Severity Measure Score 1       Health Habits and Functional and Cognitive Screenin/16/2023     9:00 AM   Functional & Cognitive Status   Do you have difficulty preparing food and eating? No   Do you have difficulty bathing yourself, getting dressed or grooming yourself? No   Do you have difficulty using the toilet? No   Do you have difficulty moving around from place to place? No   Do you have trouble with steps or getting out of a bed or a chair? No   Current Diet Unhealthy Diet   Dental Exam Up to date   Eye Exam Up to date   Exercise (times per week) 0 times per week   Current Exercises Include No Regular Exercise   Do you need help using the phone?  No   Are you deaf or do you have serious difficulty hearing?  No   Do you need  help to go to places out of walking distance? No   Do you need help shopping? No   Do you need help preparing meals?  No   Do you need help with housework?  No   Do you need help with laundry? No   Do you need help taking your medications? No   Do you need help managing money? No   Do you ever drive or ride in a car without wearing a seat belt? No   Have you felt unusual stress, anger or loneliness in the last month? No   Who do you live with? Spouse   If you need help, do you have trouble finding someone available to you? No   Have you been bothered in the last four weeks by sexual problems? No   Do you have difficulty concentrating, remembering or making decisions? No       Fall Risk Screen:  [facility], and patient is at LOW risk for falls.Assessment completed on:11/16/2023    ACE III MINI        Does the patient have evidence of cognitive impairment? No    Aspirin use counseling: Taking ASA appropriately as indicated    Recent Lab Results:  CMP:  Lab Results   Component Value Date    BUN 19 11/06/2023    CREATININE 0.93 11/06/2023    EGFRIFNONA 69 11/10/2021    EGFRIFAFRI 84 11/10/2021    BCR 20.4 11/06/2023     11/06/2023    K 4.3 11/06/2023    CO2 26.0 11/06/2023    CALCIUM 9.6 11/06/2023    PROTENTOTREF 6.7 11/06/2023    ALBUMIN 4.8 11/06/2023    LABGLOBREF 1.9 11/06/2023    LABIL2 2.5 11/06/2023    BILITOT 0.3 11/06/2023    ALKPHOS 76 11/06/2023    AST 23 11/06/2023    ALT 16 11/06/2023     HbA1c:  Lab Results   Component Value Date    HGBA1C 6.90 (H) 11/06/2023    HGBA1C 6.50 (H) 04/17/2023     Microalbumin:  Lab Results   Component Value Date    MICROALBUR 14.5 11/06/2023     Lipid Panel  Lab Results   Component Value Date    CHOL 140 10/08/2019    TRIG 173 (H) 11/06/2023    HDL 36 (L) 11/06/2023    LDL 74 11/06/2023    AST 23 11/06/2023    ALT 16 11/06/2023       Visual Acuity:  No results found.    Age-appropriate Screening Schedule:  Refer to the list below for  future screening recommendations based on patient's age, sex and/or medical conditions. Orders for these recommended tests are listed in the plan section. The patient has been provided with a written plan.    Health Maintenance   Topic Date Due    ZOSTER VACCINE (2 of 3) 06/05/2015    HEPATITIS C SCREENING  Never done    TDAP/TD VACCINES (2 - Td or Tdap) 01/10/2021    DIABETIC EYE EXAM  10/05/2021    COVID-19 Vaccine (5 - 2023-24 season) 12/12/2112 (Originally 9/1/2023)    BMI FOLLOWUP  05/04/2024    HEMOGLOBIN A1C  05/06/2024    LIPID PANEL  11/06/2024    URINE MICROALBUMIN  11/06/2024    ANNUAL WELLNESS VISIT  11/16/2024    COLORECTAL CANCER SCREENING  02/15/2032    INFLUENZA VACCINE  Completed    Pneumococcal Vaccine 65+  Completed        Subjective   History of Present Illness    Christian Mcintosh is a 78 y.o. male who presents for a Subsequent Wellness Visit and physical examination. He has a history of aortic valve replacement, mixed hyperlipidemia, essential hypertension, diabetes type 2, chronic GERD, and remote seizure disorder.    He is doing well overall. He has been experiencing tinnitus in his ears. He denies any hearing loss. He denies any headache, dizziness, or seizures. He is currently taking Keppra. He had an incident of dizziness and lightheadedness in 2015. His blood pressure was extremely low at that time.    He just received 2 pairs of glasses. He was told that his vision was 20/50 and the other was 20/20. He uses reading glasses. He states that he was given 1 for night driving. He can close his left eye and his vision improves appose to both eyes. He has had cataract surgery. He had YAG laser surgery on both eyes in 02/2023 or 03/2023. He is not scheduled to see him again until 04/2024.    He denies any difficulty with allergy drainage. He experienced allergic rh[place] and took allergy pills. He denies any dysphagia, coughing, or wheezing. He smoked until he was  42 years old. He denies any chest pain, or palpitations. He is supposed to receive his echocardiogram next year in 2024.     He patient denies any nausea, diarrhea, constipation, or rectal bleeding. He denies any bladder incontinence. He must get up 1 to 2 times a night to urinate. He experiences trouble with his knees while ambulating. He attempts to walk 3 miles a day. He still has a shoulder problem. He is unable to reach up with his right upper extremity.    He had a colonoscopy on 02/15/2023 with a few diverticula. He was told to repeat it in 5 years.      CHRONIC CONDITIONS    The following portions of the patient's history were reviewed and updated as appropriate: allergies, current medications, past family history, past medical history, past social history, and past surgical history.    Outpatient Medications Prior to Visit   Medication Sig Dispense Refill    aspirin 81 MG EC tablet Take 1 tablet by mouth Daily. 90 tablet 3    B Complex Vitamins (VITAMIN B COMPLEX) capsule capsule Take 1 capsule by mouth Daily.      Cholecalciferol (VITAMIN D3) 5000 units capsule capsule Take 1 capsule by mouth Daily.      Coenzyme Q10 (CO Q 10 PO) Take 1 capsule by mouth Daily.      DHEA 25 MG capsule Take 25 mg by mouth Daily.      levETIRAcetam (KEPPRA) 500 MG tablet Take 0.5 tablets by mouth Daily. 45 tablet 3    lisinopril (PRINIVIL,ZESTRIL) 2.5 MG tablet Take 1 tablet by mouth Daily. 90 tablet 3    metFORMIN (GLUCOPHAGE) 500 MG tablet TAKE 1 TABLET BY MOUTH TWICE DAILY WITH MEALS 180 tablet 1    Multiple Vitamins-Minerals (CENTRUM ADULTS PO) Take 1 tablet by mouth Daily.      Omega-3 Fatty Acids (FISH OIL PO) Take 1 tablet by mouth Daily.      pantoprazole (PROTONIX) 40 MG EC tablet Take 1 tablet by mouth Daily. 90 tablet 3    rosuvastatin (CRESTOR) 5 MG tablet Take 1 tablet by mouth Every Night. 90 tablet 3     No facility-administered medications prior to visit.       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 Patient has an advance directive (not in EMR), copy requested.    Identification of Risk Factors:  Risk factors include: Cardiovascular risk.    Review of Systems  HEENT: Complains of tinnitus  NECK: Denies any pain, stiffness, swelling or dysphagia  CHEST: Denies cough or wheeze or recent lung infections  CARDIAC: Denies chest pain, pressure or palpitations remote aortic valve replacement 2013  ABD: Denies nausea, vomiting or abdominal pain  : Denies dysuria  NEURO: Denies syncope, concussion, neuropathy  PSYCH: Denies any stress  EXTREM: Denies arthritic changes myalgia or arthralgia remote cuff repair left shoulder remote bilateral meniscus repair left and right knee with reduced range of motion and cuff tear right shoulder  SKIN: Denies any rash    Compared to one year ago, the patient feels his physical health is the same.  Compared to one year ago, the patient feels his mental health is the same.    Objective     Physical Exam   HEENT: Pupils equal  reactive ENT clear, no facial asymmetry, the pharynx is clear  NECK: No masses bruit or thyromegaly  CHEST: Clear without rales wheezes or rhonchi, 97 percent oxygen saturation  CARDIAC: Regular rhythm without gallop rub or click, blood pressure is 122/70 mmHg bilateral, heart rate stable  ABD: Liver spleen normal, good bowel sounds, nontender  : Deferred  NEURO: Intact  PSYCH: Normal  EXTREM: No significant arthritic changes, no edema reduced range of motion right shoulder with her arthritic changes with elastic braces left and right knee and remote repair left shoulder  SKIN: Clear      ECG 12 Lead    Date/Time: 11/16/2023 6:17 PM  Performed by: Merrill De La Paz MD    Authorized by: Merrill De La Paz MD  Comparison: compared with previous ECG from 10/19/2022  Similar to previous ECG  Comparison to previous ECG: Current EKG shows sinus rhythm with first-degree AV block of 0.216 left axis interventricular conduction delay and nonspecific ST and T wave changes similar to EKG of October 19, 2022 with a slightly longer MS interval of 0.235  Rhythm: sinus rhythm  Rate: normal  Conduction: 1st degree AV block and non-specific intraventricular conduction delay  QRS axis: left  Other findings: non-specific ST-T wave changes    Clinical impression: abnormal EKG  Comments: Current EKG shows sinus rhythm first-degree AV block left axis interventricular conduction delay and nonspecific ST and T wave changes similar to EKG of October 19, 2022 asymptomatic and no change       Patient Wellness Counseling:   Plan of care reviewed with patient at the conclusion of today's visit. Education was provided in regards to diagnosis, diet and exercise, prostate cancer screening discussed including benefit of early detection, potential need for follow-up, and harms associated with additional management. Patient agrees to screening.    Nutrition, physical activity, healthy weight,ways to reduce stress, adequate sleep, injury prevention, misuse  "of tobacco, alcohol and drugs, sexual behavior and STD's, dental health, mental health, and immunizations.    Plan of care reviewed with patient at the conclusion of today's visit. Education was provided regarding diagnosis, management and any prescribed or recommended OTC medications.  Patient verbalizes understanding of and agreement with management plan.      Vitals:    11/16/23 0937   BP: 114/80   BP Location: Right arm   Patient Position: Sitting   Cuff Size: Adult   Pulse: 60   Weight: 83.5 kg (184 lb)   Height: 167.6 cm (66\")   PainSc: 0-No pain              Assessment & Plan   Problem List Items Addressed This Visit          Cardiac and Vasculature    Status post aortic valve replacement with bioprosthetic valve (Chronic)    Overview     Echocardiogram (08/08/2012): Moderate AS, KLAUDIA of 0.8 cm2.  Echocardiogram (02/26/2013): LVEF 55%, severe AS with mean gradient of 65 mmHg.   Bioprosthetic aortic valve replacement by Dr. Stuart Griffin (03/26/2013): 25-mm Magna Ease with ascending aortoplasty. Left atrial appendage excluded  Echo (6/15/18): LVEF 48%. Normal functioning bioprosthetic aortic valve. Trivial paravalvular leak.  Echo (4/23/2021): Echo unchanged from 6/15/2018         Current Assessment & Plan     Aortic valve replaced March 26, 2013 currently on aspirin 81 lisinopril 2.5 Crestor 5 mg current EKG shows sinus rhythm with first-degree AV block left axis incomplete interventricular conduction delay and nonspecific ST and T wave changes similar to EKG of October 19, 2022 with cholesterol of 140 and LDL of 74 continue therapy         Relevant Medications    aspirin 81 MG EC tablet    Other Relevant Orders    ECG 12 Lead    Essential hypertension (Chronic)    Overview     Target blood pressure <130/80 mmHg         Current Assessment & Plan       Essential hypertension current blood pressure 114/80 and heart rate of 60 on lisinopril 2.5 mg daily CMP shows normal renal function and EKG sinus rhythm with " first-degree AV block left axis and incomplete interventricular conduction delay and nonspecific ST and T wave changes similar to EKG of October 19, 2022         Relevant Medications    lisinopril (PRINIVIL,ZESTRIL) 2.5 MG tablet    Other Relevant Orders    ECG 12 Lead    Hyperlipidemia LDL goal <70    Overview     Statin therapy indicated given presence of coronary artery disease         Current Assessment & Plan       Mixed hyperlipidemia on Crestor 5 mg daily denies myalgia or arthralgia liver function is normal cholesterol is 140 and LDL 74 continue therapy         Relevant Medications    rosuvastatin (CRESTOR) 5 MG tablet    First degree AV block    Overview     Current EKG shows sinus bradycardia with first-degree AV block 0.244 and incomplete right bundle similar to EKG of November 5, 2020 with a IL interval of 0.221 with left axis asymptomatic         Current Assessment & Plan     First-degree AV block with current EKG showing IL interval of 0.216 as compared to pursue AV block of 0.235 October 19, 2022 asymptomatic         Relevant Orders    ECG 12 Lead       Endocrine and Metabolic    Type 2 diabetes mellitus without complication, without long-term current use of insulin    Overview     Well-controlled  Continue metformin and lisinopril         Current Assessment & Plan       Diabetes type 2 on metformin 500 mg twice daily with recent fasting blood sugar 129 and A1c of 6.90 encouraged continued healthy cardiac diabetic diet with reduce carbs and weight loss and continue therapy of the metformin         Relevant Medications    metFORMIN (GLUCOPHAGE) 500 MG tablet    lisinopril (PRINIVIL,ZESTRIL) 2.5 MG tablet       Gastrointestinal Abdominal     Chronic GERD    Overview     History of chronic GERD and gastritis on Protonix 40 mg daily         Current Assessment & Plan     Chronic GERD on pantoprazole 40 mg daily continue therapy         Relevant Medications    pantoprazole (PROTONIX) 40 MG EC tablet        Health Encounters    Medicare annual wellness visit, subsequent - Primary    Overview     Patient 78-year-old male presents for Medicare annual wellness visit and physical examination            Musculoskeletal and Injuries    Osteoarthritis (Chronic)    Overview     History of bilateral knee arthritis and bilateral shoulder arthritis status post repair meniscus left and right knee, and injection of Synvisc right knee last injection was approximately 1 year ago with a history of left shoulder replacement and current right shoulder discomfort pain the patient is on PRN ibuprofen         Current Assessment & Plan     Remote left and right knee meniscus repair and physical plasty of the right knee with left shoulder arthroplasty and right shoulder arthritic change with reduced range of motion patient has little discomfort in his joints and is overall doing well continue Tylenol as needed         Primary osteoarthritis of both knees    Overview     Degenerative arthritis disease primarily of the shoulders and knees with previous injections of Synvisc in both knees currently symptomatic soreness in the right knee         Current Assessment & Plan     Remote meniscus repair left and right knee and physical plasty of the right knee         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    Outpatient Encounter Medications as of 11/16/2023   Medication Sig Dispense Refill    aspirin 81 MG EC tablet Take 1 tablet by mouth Daily. 90 tablet 3    B Complex Vitamins (VITAMIN B COMPLEX) capsule capsule Take 1 capsule by mouth Daily.      Cholecalciferol (VITAMIN D3) 5000 units capsule capsule Take 1 capsule by mouth Daily.      Coenzyme Q10 (CO Q 10 PO) Take 1 capsule by mouth Daily.      DHEA 25 MG capsule Take 25 mg by mouth Daily.      levETIRAcetam (KEPPRA) 500 MG tablet Take 0.5 tablets by mouth Daily. 45 tablet 3    lisinopril (PRINIVIL,ZESTRIL) 2.5 MG tablet Take 1 tablet by mouth Daily. 90 tablet 3    metFORMIN (GLUCOPHAGE) 500 MG tablet TAKE 1 TABLET BY MOUTH TWICE DAILY WITH MEALS 180 tablet 1    Multiple Vitamins-Minerals (CENTRUM ADULTS PO) Take 1 tablet by mouth Daily.      Omega-3 Fatty Acids (FISH OIL PO) Take 1 tablet by mouth Daily.      pantoprazole (PROTONIX) 40 MG EC tablet Take 1 tablet by mouth Daily. 90 tablet 3    rosuvastatin (CRESTOR) 5 MG tablet Take 1 tablet by mouth Every Night. 90 tablet 3     No facility-administered encounter medications on file as of 11/16/2023.       Reviewed use of high risk medication in the elderly: yes  Reviewed for potential of harmful drug interactions in the elderly: yes    Follow Up:  Return in about 6 months (around 5/16/2024) for Recheck.     Patient Instructions   Recent lab discussed  EKG discussed with pressure AV block improved  Encouraged healthy cardiac diabetic diet with reduce carbs and weight loss  Encouraged  walking exercise and ADL activity  Continue all current medications  Follow-up with Dr. Baljinder Spangler cardiologist  Return visit 6 months or as needed      An After Visit Summary and PPPS with all of these plans were given to the patient.       Transcribed from ambient dictation for Merrill De La Paz MD by Magalis Verdugo.  11/16/23   13:33 EST    Patient or patient representative verbalized consent to the visit recording.  I have personally performed the services described in this document as transcribed by the above individual, and it is both accurate and complete.

## 2023-11-16 NOTE — ASSESSMENT & PLAN NOTE
Cuff repair left shoulder with degeneration and reduced range of motion and cuff tear with right shoulder with limited range of motion and mild discomfort continue follow-up with orthopedic

## 2023-11-16 NOTE — ASSESSMENT & PLAN NOTE
Aortic valve replaced March 26, 2013 currently on aspirin 81 lisinopril 2.5 Crestor 5 mg current EKG shows sinus rhythm with first-degree AV block left axis incomplete interventricular conduction delay and nonspecific ST and T wave changes similar to EKG of October 19, 2022 with cholesterol of 140 and LDL of 74 continue therapy

## 2023-11-16 NOTE — PATIENT INSTRUCTIONS
Recent lab discussed  EKG discussed with pressure AV block improved  Encouraged healthy cardiac diabetic diet with reduce carbs and weight loss  Encouraged walking exercise and ADL activity  Continue all current medications  Follow-up with Dr. Baljinder Spangler cardiologist  Return visit 6 months or as needed

## 2023-11-16 NOTE — ASSESSMENT & PLAN NOTE
Diabetes type 2 on metformin 500 mg twice daily with recent fasting blood sugar 129 and A1c of 6.90 encouraged continued healthy cardiac diabetic diet with reduce carbs and weight loss and continue therapy of the metformin

## 2023-11-16 NOTE — ASSESSMENT & PLAN NOTE
Remote left and right knee meniscus repair and physical plasty of the right knee with left shoulder arthroplasty and right shoulder arthritic change with reduced range of motion patient has little discomfort in his joints and is overall doing well continue Tylenol as needed

## 2023-11-16 NOTE — ASSESSMENT & PLAN NOTE
Mixed hyperlipidemia on Crestor 5 mg daily denies myalgia or arthralgia liver function is normal cholesterol is 140 and LDL 74 continue therapy

## 2023-11-16 NOTE — ASSESSMENT & PLAN NOTE
Essential hypertension current blood pressure 114/80 and heart rate of 60 on lisinopril 2.5 mg daily CMP shows normal renal function and EKG sinus rhythm with first-degree AV block left axis and incomplete interventricular conduction delay and nonspecific ST and T wave changes similar to EKG of October 19, 2022

## 2023-12-08 DIAGNOSIS — E11.9 TYPE 2 DIABETES MELLITUS WITHOUT COMPLICATION, WITHOUT LONG-TERM CURRENT USE OF INSULIN: ICD-10-CM

## 2024-02-09 ENCOUNTER — TELEPHONE (OUTPATIENT)
Dept: INTERNAL MEDICINE | Facility: CLINIC | Age: 79
End: 2024-02-09
Payer: MEDICARE

## 2024-02-09 RX ORDER — BENZONATATE 200 MG/1
200 CAPSULE ORAL 3 TIMES DAILY PRN
Qty: 30 CAPSULE | Refills: 0 | Status: SHIPPED | OUTPATIENT
Start: 2024-02-09 | End: 2024-02-19

## 2024-02-09 NOTE — TELEPHONE ENCOUNTER
I would suggest use pedro luis vasquez and can buy oTC med of mucinex dm and flonase 1 spray each nostril twice a day to help symptoms. It is likely vit ral and antibiotics will not help it

## 2024-02-09 NOTE — TELEPHONE ENCOUNTER
I spoke to patient.  He's c/o URI symptoms of sore throat, nasal drainage, occas cough, low grade temp x 72 hours.  States he's some better today.   Note patient fell on Monday.  Went to [facility] and has a hairline fracture of his right hip.  He's been advised to stay off of it as much as possible x 6 weeks. States he's doing OK with this but wonders if he can have something for the URI

## 2024-02-09 NOTE — TELEPHONE ENCOUNTER
Caller: Christian Mcintosh    Relationship: Self    Best call back number: [phone]    What medication are you requesting: PCP DISCRETION    What are your current symptoms: SORE THROAT, FEVER, COUGHING    How long have you been experiencing symptoms: 3 DAYS    Have you had these symptoms before:    [x] Yes  [] No    Have you been treated for these symptoms before:   [x] Yes  [] No    If a prescription is needed, what is your preferred pharmacy and phone number:      [id number] [place] [id number] [address] AT [facility] [address] - [phone]  - [phone]      Additional notes:

## 2024-04-22 RX ORDER — LEVETIRACETAM 500 MG/1
250 TABLET ORAL DAILY
Qty: 45 TABLET | Refills: 3 | Status: SHIPPED | OUTPATIENT
Start: 2024-04-22

## 2024-05-03 ENCOUNTER — TELEPHONE (OUTPATIENT)
Dept: INTERNAL MEDICINE | Facility: CLINIC | Age: 79
End: 2024-05-03
Payer: MEDICARE

## 2024-05-05 DIAGNOSIS — I10 ESSENTIAL HYPERTENSION: ICD-10-CM

## 2024-05-05 DIAGNOSIS — E11.9 TYPE 2 DIABETES MELLITUS WITHOUT COMPLICATION, WITHOUT LONG-TERM CURRENT USE OF INSULIN: Primary | ICD-10-CM

## 2024-05-05 DIAGNOSIS — E78.5 HYPERLIPIDEMIA LDL GOAL <70: ICD-10-CM

## 2024-05-06 ENCOUNTER — LAB (OUTPATIENT)
Dept: LAB | Facility: HOSPITAL | Age: 79
End: 2024-05-06
Payer: MEDICARE

## 2024-05-06 DIAGNOSIS — E78.5 HYPERLIPIDEMIA LDL GOAL <70: ICD-10-CM

## 2024-05-06 DIAGNOSIS — E11.9 TYPE 2 DIABETES MELLITUS WITHOUT COMPLICATION, WITHOUT LONG-TERM CURRENT USE OF INSULIN: ICD-10-CM

## 2024-05-06 DIAGNOSIS — I10 ESSENTIAL HYPERTENSION: ICD-10-CM

## 2024-05-06 LAB
ALBUMIN SERPL-MCNC: 4.6 G/DL (ref 3.5–5.2)
ALBUMIN UR-MCNC: 1.3 MG/DL
ALBUMIN/GLOB SERPL: 1.8 G/DL
ALP SERPL-CCNC: 78 U/L (ref 39–117)
ALT SERPL W P-5'-P-CCNC: 17 U/L (ref 1–41)
ANION GAP SERPL CALCULATED.3IONS-SCNC: 11.4 MMOL/L (ref 5–15)
AST SERPL-CCNC: 24 U/L (ref 1–40)
BASOPHILS # BLD AUTO: 0.07 10*3/MM3 (ref 0–0.2)
BASOPHILS NFR BLD AUTO: 0.9 % (ref 0–1.5)
BILIRUB SERPL-MCNC: 0.5 MG/DL (ref 0–1.2)
BUN SERPL-MCNC: 24 MG/DL (ref 8–23)
BUN/CREAT SERPL: 21.6 (ref 7–25)
CALCIUM SPEC-SCNC: 10.2 MG/DL (ref 8.6–10.5)
CHLORIDE SERPL-SCNC: 105 MMOL/L (ref 98–107)
CHOLEST SERPL-MCNC: 152 MG/DL (ref 0–200)
CO2 SERPL-SCNC: 25.6 MMOL/L (ref 22–29)
CREAT SERPL-MCNC: 1.11 MG/DL (ref 0.76–1.27)
CREAT UR-MCNC: 266.3 MG/DL
DEPRECATED RDW RBC AUTO: 40.1 FL (ref 37–54)
EGFRCR SERPLBLD CKD-EPI 2021: 68.8 ML/MIN/1.73
EOSINOPHIL # BLD AUTO: 0.46 10*3/MM3 (ref 0–0.4)
EOSINOPHIL NFR BLD AUTO: 5.8 % (ref 0.3–6.2)
ERYTHROCYTE [DISTWIDTH] IN BLOOD BY AUTOMATED COUNT: 12.6 % (ref 12.3–15.4)
GLOBULIN UR ELPH-MCNC: 2.6 GM/DL
GLUCOSE SERPL-MCNC: 116 MG/DL (ref 65–99)
HBA1C MFR BLD: 6.8 % (ref 4.8–5.6)
HCT VFR BLD AUTO: 44.3 % (ref 37.5–51)
HDLC SERPL-MCNC: 39 MG/DL (ref 40–60)
HGB BLD-MCNC: 14.7 G/DL (ref 13–17.7)
IMM GRANULOCYTES # BLD AUTO: 0.03 10*3/MM3 (ref 0–0.05)
IMM GRANULOCYTES NFR BLD AUTO: 0.4 % (ref 0–0.5)
LDLC SERPL CALC-MCNC: 82 MG/DL (ref 0–100)
LDLC/HDLC SERPL: 1.97 {RATIO}
LYMPHOCYTES # BLD AUTO: 2.58 10*3/MM3 (ref 0.7–3.1)
LYMPHOCYTES NFR BLD AUTO: 32.5 % (ref 19.6–45.3)
MCH RBC QN AUTO: 29.1 PG (ref 26.6–33)
MCHC RBC AUTO-ENTMCNC: 33.2 G/DL (ref 31.5–35.7)
MCV RBC AUTO: 87.7 FL (ref 79–97)
MICROALBUMIN/CREAT UR: 4.9 MG/G (ref 0–29)
MONOCYTES # BLD AUTO: 0.67 10*3/MM3 (ref 0.1–0.9)
MONOCYTES NFR BLD AUTO: 8.4 % (ref 5–12)
NEUTROPHILS NFR BLD AUTO: 4.13 10*3/MM3 (ref 1.7–7)
NEUTROPHILS NFR BLD AUTO: 52 % (ref 42.7–76)
NRBC BLD AUTO-RTO: 0 /100 WBC (ref 0–0.2)
PLATELET # BLD AUTO: 204 10*3/MM3 (ref 140–450)
PMV BLD AUTO: 11.7 FL (ref 6–12)
POTASSIUM SERPL-SCNC: 4.6 MMOL/L (ref 3.5–5.2)
PROT SERPL-MCNC: 7.2 G/DL (ref 6–8.5)
RBC # BLD AUTO: 5.05 10*6/MM3 (ref 4.14–5.8)
SODIUM SERPL-SCNC: 142 MMOL/L (ref 136–145)
TRIGL SERPL-MCNC: 181 MG/DL (ref 0–150)
TSH SERPL DL<=0.05 MIU/L-ACNC: 2.32 UIU/ML (ref 0.27–4.2)
VLDLC SERPL-MCNC: 31 MG/DL (ref 5–40)
WBC NRBC COR # BLD AUTO: 7.94 10*3/MM3 (ref 3.4–10.8)

## 2024-05-06 PROCEDURE — 85025 COMPLETE CBC W/AUTO DIFF WBC: CPT

## 2024-05-06 PROCEDURE — 80053 COMPREHEN METABOLIC PANEL: CPT

## 2024-05-06 PROCEDURE — 82570 ASSAY OF URINE CREATININE: CPT

## 2024-05-06 PROCEDURE — 83036 HEMOGLOBIN GLYCOSYLATED A1C: CPT

## 2024-05-06 PROCEDURE — 84443 ASSAY THYROID STIM HORMONE: CPT

## 2024-05-06 PROCEDURE — 80061 LIPID PANEL: CPT

## 2024-05-06 PROCEDURE — 82043 UR ALBUMIN QUANTITATIVE: CPT

## 2024-05-15 ENCOUNTER — OFFICE VISIT (OUTPATIENT)
Dept: INTERNAL MEDICINE | Facility: CLINIC | Age: 79
End: 2024-05-15
Payer: MEDICARE

## 2024-05-15 VITALS
HEIGHT: 66 IN | HEART RATE: 60 BPM | WEIGHT: 180 LBS | OXYGEN SATURATION: 98 % | BODY MASS INDEX: 28.93 KG/M2 | DIASTOLIC BLOOD PRESSURE: 70 MMHG | SYSTOLIC BLOOD PRESSURE: 112 MMHG

## 2024-05-15 DIAGNOSIS — E11.9 TYPE 2 DIABETES MELLITUS WITHOUT COMPLICATION, WITHOUT LONG-TERM CURRENT USE OF INSULIN: ICD-10-CM

## 2024-05-15 DIAGNOSIS — I10 ESSENTIAL HYPERTENSION: Chronic | ICD-10-CM

## 2024-05-15 DIAGNOSIS — S72.114D CLOSED NONDISPLACED FRACTURE OF GREATER TROCHANTER OF RIGHT FEMUR WITH ROUTINE HEALING: ICD-10-CM

## 2024-05-15 DIAGNOSIS — Z95.3 STATUS POST AORTIC VALVE REPLACEMENT WITH BIOPROSTHETIC VALVE: Primary | Chronic | ICD-10-CM

## 2024-05-15 DIAGNOSIS — E78.5 HYPERLIPIDEMIA LDL GOAL <70: ICD-10-CM

## 2024-05-15 DIAGNOSIS — M15.9 PRIMARY OSTEOARTHRITIS INVOLVING MULTIPLE JOINTS: Chronic | ICD-10-CM

## 2024-05-15 PROCEDURE — 1159F MED LIST DOCD IN RCRD: CPT | Performed by: INTERNAL MEDICINE

## 2024-05-15 PROCEDURE — 3074F SYST BP LT 130 MM HG: CPT | Performed by: INTERNAL MEDICINE

## 2024-05-15 PROCEDURE — 3078F DIAST BP <80 MM HG: CPT | Performed by: INTERNAL MEDICINE

## 2024-05-15 PROCEDURE — 99214 OFFICE O/P EST MOD 30 MIN: CPT | Performed by: INTERNAL MEDICINE

## 2024-05-15 PROCEDURE — 1160F RVW MEDS BY RX/DR IN RCRD: CPT | Performed by: INTERNAL MEDICINE

## 2024-05-15 PROCEDURE — 1126F AMNT PAIN NOTED NONE PRSNT: CPT | Performed by: INTERNAL MEDICINE

## 2024-05-15 NOTE — PATIENT INSTRUCTIONS
Lab of May 6 discussed with A1c of 6.0 fasting blood sugar of 116 with normal kidney normal liver normal electrolytes normal cholesterol of 152 and LDL of 82 triglycerides of 181  Continue all current therapy  Continue follow-up with [facility] for right greater trochanteric fracture Dr. Taryn PHAM orthopedist  Continue physical therapy for right hip  Continue all current medications  Encouraged healthy cardiac diet reduce carbs and weight loss  Return for AWV on November 21, 2024

## 2024-05-15 NOTE — ASSESSMENT & PLAN NOTE
Essential hypertension with current blood pressure 112/70 heart rate of 60 and O2 saturation 98% on lisinopril 2.5 mg daily continue therapy recent CMP of May 6 reveals blood sugar 116 normal kidney normal liver normal electrolytes continue therapy

## 2024-05-15 NOTE — ASSESSMENT & PLAN NOTE
Diabetes type 2 on metformin 500 mg twice daily with urine protein low A1c of 6.80 and fasting blood sugar 116 continue current therapy and careful cardiac diabetic diet with reduce carbs and weight loss

## 2024-05-15 NOTE — ASSESSMENT & PLAN NOTE
Mild soreness at the site of the February 5, 2024 fracture of the right greater trochanter he has received physical therapy is improved he does have small gait problem with mild limp and is uncomfortable after driving for a lengthy time or lying on his right side to continue current activity physical therapy and Tylenol as needed and follow-up with orthopedic surgeon Dr. Centeno

## 2024-05-15 NOTE — ASSESSMENT & PLAN NOTE
Degenerative arthritis disease with meniscus repair left and right knees and shoulder arthritis with left shoulder arthroplasty and right shoulder with reduced range of motion and loss of strength continue follow-up with Ortho active lifestyle Tylenol as needed and therapy as prescribed

## 2024-05-15 NOTE — ASSESSMENT & PLAN NOTE
Aortic valve replaced March 26, 2013 patient overall feels well without pressure discomfort palpitations shortness of breath or chest pain on aspirin 81 lisinopril 2.5 and Crestor 5 continue therapy and follow-up with cardiology

## 2024-05-15 NOTE — ASSESSMENT & PLAN NOTE
Mixed hyperlipidemia on Crestor 5 mg every evening with recent CMP showing normal liver function and cholesterol of 152 and LDL of 82 with slightly elevated triglycerides of 181 secondary to diabetes continue therapy

## 2024-05-15 NOTE — PROGRESS NOTES
[facility]     Christian Mcintosh  1945   [id number]      Patient Care Team:  Merrill De La Paz MD as PCP - General (Internal Medicine)  Marshal Spangler IV, MD as Cardiologist (Interventional Cardiology)  Silvio Ahn MD as Consulting Physician (Urology)    Chief Complaint::   Chief Complaint   Patient presents with    Hypertension     6 mo follow up.  Labs completed prior to visit    Hyperlipidemia    Diabetes    s/p hip fracture     Right greater trochanter, 2/2024.  Seeing ortho at [facility] and doing physical therapy.  No surgery done.              HPI  History of Present Illness  The patient is a 78-year-old male with recent fracture of the right greater trochanter in 02/2025 with a history of hypertension, mixed hyperlipidemia, and diabetes for office visit.    The patient recounts an incident at work on 02/05/2024 when he tripped over a skid object at work, impacting his right hip on a concrete floor with his full weight. He was diagnosed with a fracture at the site of the femur, and was subsequently referred to Dr. Centeno, an orthopedist, at [facility]. An x-ray was performed, and he was advised to undergo physical therapy. He utilized a walker for approximately 6 to 8 weeks post-fall. Despite the absence of pain during sitting, walking, standing, or climbing stairs, he reports no discomfort when lying flat on his back, but experiences pain when attempting to turn in either direction. He was unable to lie on his right side for the past 6 to 8 weeks, but currently, he is able to sleep comfortably. He reports no difficulty in putting on shoes and socks, but experiences difficulty lifting his leg after approximately 30 minutes of driving. He has been practicing stepping on a small step in physical therapy. He experienced a transient episode of tingling in his feet, which has since resolved. He continues to wear a brace for support.    Supplemental Information  His  biggest problem is balance. He is scheduled for an echocardiogram on 06/10/2024.  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    Testicular hypofunction    First degree AV block    BPH with urinary obstruction    Chronic GERD    Primary osteoarthritis of both knees    Health counseling    Osteoarthritis    Secondary osteoarthritis of right shoulder due to rotator cuff tear    Medicare annual wellness visit, subsequent    Ataxia    Medicare annual wellness visit, subsequent    Bilateral impacted cerumen    Medicare annual wellness visit, subsequent    Closed nondisplaced fracture of greater trochanter of right femur with routine healing        Past Medical History:   Diagnosis Date    Aortic stenosis     Aortic valve replaced 3/15/2013    Atrial flutter     RFA by Dr. Padilla    Benign prostatic hypertrophy w elevated PSA 01/03/2018    BX NO CANCER    Bicuspid aortic valve     Cataract     Coronary artery disease 03/06/2013    COVID-19 01/17/2022    Diverticulosis 2002    Gastric ulcer 2002    GERD (gastroesophageal reflux disease)     SL-Wmghwsc-Gelrh tear 07/23/2013    H/O atrial flutter     Heart murmur 1/1/1966    [employer]    History of basal cell cancer 2007    History of echocardiogram     History of rotator cuff tear     Hyperlipidemia     Hypertension     Osteoarthritis 2011    PONV (postoperative nausea and vomiting)     Seizure     Skin cancer     skin.  BASAL CELL SKIN CANCER LEFT CHEEK    Status  post shoulder surgery     Tear of left rotator cuff     Tinnitus     Typical atrial flutter 06/07/2016    · Diagnosed postoperatively, asymptomatic. · Typical flutter pattern on ECG, 05/03/2013. Initiated on Coumadin. · Left atrial appendage excluded with AVR using Tiger Paw occluder. · Radiofrequency ablation by Dr. Brooks Padilla, July 2013.  · Coumadin initiated following ablation but stopped after development of GI bleeding and a Tarsha-Villeda tear.     Urinary retention     mcqueen       Past Surgical History:   Procedure Laterality Date    AORTIC VALVE REPAIR/REPLACEMENT  3/15/2013    BASAL CELL CARCINOMA EXCISION  2007    BONE SPUR ARM      CARDIAC ABLATION      CARDIAC CATHETERIZATION  03/06/2013    CARDIAC VALVE REPLACEMENT  03/26/2013    bioprosthetic aortic valve    CARDIAC VALVE REPLACEMENT      CARPAL TUNNEL RELEASE      CATARACT EXTRACTION      COLONOSCOPY      COLONOSCOPY  08/29/2012    CYST REMOVAL      CYSTOSCOPY TRANSURETHRAL RESECTION OF PROSTATE N/A 01/03/2018    Procedure: PROSTATE ULTRASOUND AND BIOPSY, CYSTOSCOPY TRANSURETHRAL RESECTION OF PROSTATE GREENLIGHT WITH VAPORIZATION;  Surgeon: Silvio Ahn MD;  Location: [facility];  Service:     ENDOSCOPY  2002    w/biopsy    EYE SURGERY      bilateral cataract extraction    MCQUEEN CATHETER  11/01/2016    HERNIA REPAIR  2010    KNEE ARTHROSCOPY      KNEE MENISCAL REPAIR Bilateral     KNEE SURGERY Left 2011    LASIK      PROSTATE BIOPSY  01/03/2018    ROTATOR CUFF REPAIR  10/26/2016    SHOULDER SURGERY Left     SKIN CANCER EXCISION  12/07/2016    left cheek    SKIN CANCER EXCISION      TURP / TRANSURETHRAL INCISION / DRAINAGE PROSTATE      VASECTOMY         Family History   Problem Relation Age of Onset    No Known Problems Mother     Heart attack Father 60        Father    Coronary artery disease Father     Heart disease Brother         Btother    Diabetes Brother     Diabetes Sister     Other Son         Car accident       Social History  "    Socioeconomic History    Marital status:    Tobacco Use    Smoking status: Former     Current packs/day: 0.00     Average packs/day: 2.0 packs/day for 23.0 years (45.9 ttl pk-yrs)     Types: Cigarettes     Start date: 1963     Quit date: 1986     Years since quittin.3    Smokeless tobacco: Never   Vaping Use    Vaping status: Never Used   Substance and Sexual Activity    Alcohol use: Yes     Alcohol/week: 1.0 standard drink of alcohol     Types: 1 Shots of liquor per week     Comment: occasional     Drug use: No    Sexual activity: Not Currently       Allergies   Allergen Reactions    Shellfish-Derived Products Anaphylaxis       Review of Systems     HEENT: Denies headache complains of slight balance lightheadedness with ambulation since the fracture of the right greater trochanter 2024  NECK: Denies pain stiffness dysphagia  CHEST: Denies cough wheeze or recent lung infection  CARDIAC: Denies chest pain pressure palpitations aortic valve replaced 2013 aortic valve is followed by cardiology with upcoming echocardiogram  ABD: Denies nausea vomiting abdominal pain  : Denies dysuria frequency  NEURO: Denies syncope concussion  PSYCH: Denies anxiety depression  EXTREM: Complains of right lateral hip soreness from fracture 2024  SKIN: Clear    Vital Signs  Vitals:    05/15/24 0954   BP: 112/70   BP Location: Left arm   Patient Position: Sitting   Cuff Size: Adult   Pulse: 60   SpO2: 98%   Weight: 81.6 kg (180 lb)   Height: 167.6 cm (66\")   PainSc: 0-No pain     Body mass index is 29.05 kg/m².  BMI is >= 25 and <30. (Overweight) The following options were offered after discussion;: exercise counseling/recommendations and nutrition counseling/recommendations     Advance Care Planning   ACP discussion was held with the patient during this visit. Patient has an advance directive (not in EMR), copy requested.       Current Outpatient Medications:     aspirin 81 MG EC " tablet, Take 1 tablet by mouth Daily., Disp: 90 tablet, Rfl: 3    B Complex Vitamins (VITAMIN B COMPLEX) capsule capsule, Take 1 capsule by mouth Daily., Disp: , Rfl:     Cholecalciferol (VITAMIN D3) 5000 units capsule capsule, Take 1 capsule by mouth Daily., Disp: , Rfl:     Coenzyme Q10 (CO Q 10 PO), Take 1 capsule by mouth Daily., Disp: , Rfl:     DHEA 25 MG capsule, Take 25 mg by mouth Daily., Disp: , Rfl:     fluticasone (FLONASE) 50 MCG/ACT nasal spray, 1-2 sprays each nostril daily, Disp: 16 g, Rfl: 0    levETIRAcetam (KEPPRA) 500 MG tablet, TAKE 1/2 TABLET BY MOUTH DAILY, Disp: 45 tablet, Rfl: 3    lisinopril (PRINIVIL,ZESTRIL) 2.5 MG tablet, Take 1 tablet by mouth Daily., Disp: 90 tablet, Rfl: 3    metFORMIN (GLUCOPHAGE) 500 MG tablet, TAKE 1 TABLET BY MOUTH TWICE DAILY WITH MEALS, Disp: 180 tablet, Rfl: 1    Multiple Vitamins-Minerals (CENTRUM ADULTS PO), Take 1 tablet by mouth Daily., Disp: , Rfl:     Omega-3 Fatty Acids (FISH OIL PO), Take 1 tablet by mouth Daily., Disp: , Rfl:     pantoprazole (PROTONIX) 40 MG EC tablet, Take 1 tablet by mouth Daily., Disp: 90 tablet, Rfl: 3    rosuvastatin (CRESTOR) 5 MG tablet, Take 1 tablet by mouth Every Night., Disp: 90 tablet, Rfl: 3    albuterol sulfate  (90 Base) MCG/ACT inhaler, Inhale 2 puffs Every 4 (Four) Hours As Needed for Wheezing. (Patient not taking: Reported on 5/15/2024), Disp: 18 g, Rfl: 0    doxycycline (MONODOX) 100 MG capsule, 1 po bid, Disp: 20 capsule, Rfl: 0    Physical Exam     ACE III MINI        Physical Exam  Physical Exam  Eyes and nose, and throat appear normal. The sinuses do not seem to be tender.  The neck appears normal.  The lungs are clear.  The heart rhythm is regular.    Vital Signs  The patient's blood pressure is 128/76.  HEENT: No facial asymmetry pharynx is clear  NECK: No mass bruit thyromegaly neck vein distention  CHEST: Clear  CARDIAC: Regular rhythm without gallop or rub very faint murmur of aortic valve  replacement vital signs blood pressure and heart rate stable  ABD: Liver spleen normal positive bowel sounds no bruit  : Deferred  NEURO: Good range of motion of right hip with minor soreness with exam  PSYCH: Normal  EXTREM: Degenerative changes with using right hip to enteric fracture reduced range of motion right shoulder with arthroplasty of the left shoulder good range of motion and mild arthritic bilateral knee changes  Skin: Clear     Results Review:    Recent Results (from the past 672 hour(s))   Comprehensive Metabolic Panel    Collection Time: 05/06/24  8:51 AM    Specimen: Blood   Result Value Ref Range    Glucose 116 (H) 65 - 99 mg/dL    BUN 24 (H) 8 - 23 mg/dL    Creatinine 1.11 0.76 - 1.27 mg/dL    Sodium 142 136 - 145 mmol/L    Potassium 4.6 3.5 - 5.2 mmol/L    Chloride 105 98 - 107 mmol/L    CO2 25.6 22.0 - 29.0 mmol/L    Calcium 10.2 8.6 - 10.5 mg/dL    Total Protein 7.2 6.0 - 8.5 g/dL    Albumin 4.6 3.5 - 5.2 g/dL    ALT (SGPT) 17 1 - 41 U/L    AST (SGOT) 24 1 - 40 U/L    Alkaline Phosphatase 78 39 - 117 U/L    Total Bilirubin 0.5 0.0 - 1.2 mg/dL    Globulin 2.6 gm/dL    A/G Ratio 1.8 g/dL    BUN/Creatinine Ratio 21.6 7.0 - 25.0    Anion Gap 11.4 5.0 - 15.0 mmol/L    eGFR 68.8 >60.0 mL/min/1.73   Hemoglobin A1c    Collection Time: 05/06/24  8:51 AM    Specimen: Blood   Result Value Ref Range    Hemoglobin A1C 6.80 (H) 4.80 - 5.60 %   Lipid Panel    Collection Time: 05/06/24  8:51 AM    Specimen: Blood   Result Value Ref Range    Total Cholesterol 152 0 - 200 mg/dL    Triglycerides 181 (H) 0 - 150 mg/dL    HDL Cholesterol 39 (L) 40 - 60 mg/dL    LDL Cholesterol  82 0 - 100 mg/dL    VLDL Cholesterol 31 5 - 40 mg/dL    LDL/HDL Ratio 1.97    Microalbumin / Creatinine Urine Ratio - Urine, Clean Catch    Collection Time: 05/06/24  8:51 AM    Specimen: Urine, Clean Catch   Result Value Ref Range    Microalbumin/Creatinine Ratio 4.9 0.0 - 29.0 mg/g    Creatinine, Urine 266.3 mg/dL    Microalbumin, Urine  1.3 mg/dL   TSH    Collection Time: 24  8:51 AM    Specimen: Blood   Result Value Ref Range    TSH 2.320 0.270 - 4.200 uIU/mL   CBC Auto Differential    Collection Time: 24  8:51 AM    Specimen: Blood   Result Value Ref Range    WBC 7.94 3.40 - 10.80 10*3/mm3    RBC 5.05 4.14 - 5.80 10*6/mm3    Hemoglobin 14.7 13.0 - 17.7 g/dL    Hematocrit 44.3 37.5 - 51.0 %    MCV 87.7 79.0 - 97.0 fL    MCH 29.1 26.6 - 33.0 pg    MCHC 33.2 31.5 - 35.7 g/dL    RDW 12.6 12.3 - 15.4 %    RDW-SD 40.1 37.0 - 54.0 fl    MPV 11.7 6.0 - 12.0 fL    Platelets 204 140 - 450 10*3/mm3    Neutrophil % 52.0 42.7 - 76.0 %    Lymphocyte % 32.5 19.6 - 45.3 %    Monocyte % 8.4 5.0 - 12.0 %    Eosinophil % 5.8 0.3 - 6.2 %    Basophil % 0.9 0.0 - 1.5 %    Immature Grans % 0.4 0.0 - 0.5 %    Neutrophils, Absolute 4.13 1.70 - 7.00 10*3/mm3    Lymphocytes, Absolute 2.58 0.70 - 3.10 10*3/mm3    Monocytes, Absolute 0.67 0.10 - 0.90 10*3/mm3    Eosinophils, Absolute 0.46 (H) 0.00 - 0.40 10*3/mm3    Basophils, Absolute 0.07 0.00 - 0.20 10*3/mm3    Immature Grans, Absolute 0.03 0.00 - 0.05 10*3/mm3    nRBC 0.0 0.0 - 0.2 /100 WBC       Procedures    Medication Review: Medications reviewed and noted    Patient wellness counseling  Exercise: Encouraged ADL activity and physical exercise right hip  Diet: Healthy cardiac diabetic diet and weight loss  Screening:  Social History     Socioeconomic History    Marital status:    Tobacco Use    Smoking status: Former     Current packs/day: 0.00     Average packs/day: 2.0 packs/day for 23.0 years (45.9 ttl pk-yrs)     Types: Cigarettes     Start date: 1963     Quit date: 1986     Years since quittin.3    Smokeless tobacco: Never   Vaping Use    Vaping status: Never Used   Substance and Sexual Activity    Alcohol use: Yes     Alcohol/week: 1.0 standard drink of alcohol     Types: 1 Shots of liquor per week     Comment: occasional     Drug use: No    Sexual activity: Not Currently         Assessment/Plan:    Assessment & Plan  1. Fracture of the right greater trochanter.  The patient experienced a fall on 02/05/2024, resulting in a fracture of the right greater trochanter. He has been under the care of Dr. Centeno, an orthopedist, in the [facility], who has not recommended surgery. The patient is currently undergoing physical therapy. The patient is advised to continue physical therapy for the right hip and maintain his current medication regimen. A healthy cardiac diet, reduced carbohydrate intake, and weight loss are strongly recommended.    Follow-up  The patient is scheduled to return for an annual physical examination on 11/21/2023.    Problem List Items Addressed This Visit          Cardiac and Vasculature    Status post aortic valve replacement with bioprosthetic valve - Primary (Chronic)    Overview     Echocardiogram (08/08/2012): Moderate AS, KLAUDIA of 0.8 cm2.  Echocardiogram (02/26/2013): LVEF 55%, severe AS with mean gradient of 65 mmHg.   Bioprosthetic aortic valve replacement by Dr. Stuart Griffin (03/26/2013): 25-mm Magna Ease with ascending aortoplasty. Left atrial appendage excluded  Echo (6/15/18): LVEF 48%. Normal functioning bioprosthetic aortic valve. Trivial paravalvular leak.  Echo (4/23/2021): Echo unchanged from 6/15/2018         Current Assessment & Plan     Aortic valve replaced March 26, 2013 patient overall feels well without pressure discomfort palpitations shortness of breath or chest pain on aspirin 81 lisinopril 2.5 and Crestor 5 continue therapy and follow-up with cardiology         Relevant Medications    aspirin 81 MG EC tablet    Essential hypertension (Chronic)    Overview     Target blood pressure <130/80 mmHg         Current Assessment & Plan       Essential hypertension with current blood pressure 112/70 heart rate of 60 and O2 saturation 98% on lisinopril 2.5 mg daily continue therapy recent CMP of May 6 reveals blood sugar 116 normal kidney normal  liver normal electrolytes continue therapy         Relevant Medications    lisinopril (PRINIVIL,ZESTRIL) 2.5 MG tablet    Hyperlipidemia LDL goal <70    Overview     Statin therapy indicated given presence of coronary artery disease         Current Assessment & Plan       Mixed hyperlipidemia on Crestor 5 mg every evening with recent CMP showing normal liver function and cholesterol of 152 and LDL of 82 with slightly elevated triglycerides of 181 secondary to diabetes continue therapy         Relevant Medications    rosuvastatin (CRESTOR) 5 MG tablet       Endocrine and Metabolic    Type 2 diabetes mellitus without complication, without long-term current use of insulin    Overview     Well-controlled  Continue metformin and lisinopril         Current Assessment & Plan       Diabetes type 2 on metformin 500 mg twice daily with urine protein low A1c of 6.80 and fasting blood sugar 116 continue current therapy and careful cardiac diabetic diet with reduce carbs and weight loss         Relevant Medications    lisinopril (PRINIVIL,ZESTRIL) 2.5 MG tablet    metFORMIN (GLUCOPHAGE) 500 MG tablet       Musculoskeletal and Injuries    Osteoarthritis (Chronic)    Overview     History of bilateral knee arthritis and bilateral shoulder arthritis status post repair meniscus left and right knee, and injection of Synvisc right knee last injection was approximately 1 year ago with a history of left shoulder replacement and current right shoulder discomfort pain the patient is on PRN ibuprofen         Current Assessment & Plan     Degenerative arthritis disease with meniscus repair left and right knees and shoulder arthritis with left shoulder arthroplasty and right shoulder with reduced range of motion and loss of strength continue follow-up with Ortho active lifestyle Tylenol as needed and therapy as prescribed         Closed nondisplaced fracture of greater trochanter of right femur with routine healing    Overview     Fell  February 5 with fracture of right greater trochanter seen by Dr. Taryn PHAM orthopedics [facility] no surgery is continuing physical therapy         Current Assessment & Plan     Mild soreness at the site of the February 5, 2024 fracture of the right greater trochanter he has received physical therapy is improved he does have small gait problem with mild limp and is uncomfortable after driving for a lengthy time or lying on his right side to continue current activity physical therapy and Tylenol as needed and follow-up with orthopedic surgeon Dr. Centeno             Patient Instructions   Lab of May 6 discussed with A1c of 6.0 fasting blood sugar of 116 with normal kidney normal liver normal electrolytes normal cholesterol of 152 and LDL of 82 triglycerides of 181  Continue all current therapy  Continue follow-up with [facility] for right greater trochanteric fracture Dr. Taryn PHAM orthopedist  Continue physical therapy for right hip  Continue all current medications  Encouraged healthy cardiac diet reduce carbs and weight loss  Return for AWV on November 21, 2024     Plan of care reviewed with patient at the conclusion of today's visit. Education was provided regarding diagnosis, management, and any prescribed or recommended OTC medications.Patient verbalizes understanding of and agreement with management plan.         Transcribed from ambient dictation for Merrill De La Paz MD by Maryam Babcock RN.  05/15/24   10:04 EDT    Patient or patient representative verbalized consent for the use of Ambient Listening during the visit with  Merrill De La Paz MD for chart documentation. 5/15/2024  17:11 EDT

## 2024-06-09 DIAGNOSIS — E11.9 TYPE 2 DIABETES MELLITUS WITHOUT COMPLICATION, WITHOUT LONG-TERM CURRENT USE OF INSULIN: ICD-10-CM

## 2024-06-10 ENCOUNTER — HOSPITAL ENCOUNTER (OUTPATIENT)
Dept: CARDIOLOGY | Facility: HOSPITAL | Age: 79
Discharge: HOME | End: 2024-06-10
Admitting: INTERNAL MEDICINE
Payer: MEDICARE

## 2024-06-10 VITALS — BODY MASS INDEX: 28.91 KG/M2 | HEIGHT: 66 IN | WEIGHT: 179.9 LBS

## 2024-06-10 DIAGNOSIS — Z95.3 STATUS POST AORTIC VALVE REPLACEMENT WITH BIOPROSTHETIC VALVE: Chronic | ICD-10-CM

## 2024-06-10 LAB
ASCENDING AORTA: 4.3 CM
BH CV ECHO MEAS - AO MAX PG: 21.5 MMHG
BH CV ECHO MEAS - AO MEAN PG: 12 MMHG
BH CV ECHO MEAS - AO ROOT DIAM: 3.8 CM
BH CV ECHO MEAS - AO V2 MAX: 231.6 CM/SEC
BH CV ECHO MEAS - AO V2 VTI: 47.9 CM
BH CV ECHO MEAS - AVA(I,D): 1.17 CM2
BH CV ECHO MEAS - EDV(CUBED): 166.4 ML
BH CV ECHO MEAS - EDV(MOD-SP2): 145 ML
BH CV ECHO MEAS - EDV(MOD-SP4): 173 ML
BH CV ECHO MEAS - EF(MOD-BP): 54.2 %
BH CV ECHO MEAS - EF(MOD-SP2): 53.9 %
BH CV ECHO MEAS - EF(MOD-SP4): 55.8 %
BH CV ECHO MEAS - ESV(CUBED): 46.8 ML
BH CV ECHO MEAS - ESV(MOD-SP2): 66.9 ML
BH CV ECHO MEAS - ESV(MOD-SP4): 76.5 ML
BH CV ECHO MEAS - FS: 34.5 %
BH CV ECHO MEAS - IVS/LVPW: 1 CM
BH CV ECHO MEAS - IVSD: 1 CM
BH CV ECHO MEAS - LA DIMENSION: 4.3 CM
BH CV ECHO MEAS - LAT PEAK E' VEL: 12.6 CM/SEC
BH CV ECHO MEAS - LV DIASTOLIC VOL/BSA (35-75): 90.5 CM2
BH CV ECHO MEAS - LV MASS(C)D: 213.2 GRAMS
BH CV ECHO MEAS - LV MAX PG: 3.1 MMHG
BH CV ECHO MEAS - LV MEAN PG: 1.5 MMHG
BH CV ECHO MEAS - LV SYSTOLIC VOL/BSA (12-30): 40 CM2
BH CV ECHO MEAS - LV V1 MAX: 87.6 CM/SEC
BH CV ECHO MEAS - LV V1 VTI: 17.5 CM
BH CV ECHO MEAS - LVIDD: 5.5 CM
BH CV ECHO MEAS - LVIDS: 3.6 CM
BH CV ECHO MEAS - LVOT AREA: 3.2 CM2
BH CV ECHO MEAS - LVOT DIAM: 2.02 CM
BH CV ECHO MEAS - LVPWD: 1 CM
BH CV ECHO MEAS - MED PEAK E' VEL: 7.3 CM/SEC
BH CV ECHO MEAS - MV A MAX VEL: 72.8 CM/SEC
BH CV ECHO MEAS - MV DEC SLOPE: 209.3 CM/SEC2
BH CV ECHO MEAS - MV DEC TIME: 0.31 SEC
BH CV ECHO MEAS - MV E MAX VEL: 56.6 CM/SEC
BH CV ECHO MEAS - MV E/A: 0.78
BH CV ECHO MEAS - MV MAX PG: 2.6 MMHG
BH CV ECHO MEAS - MV MEAN PG: 0.96 MMHG
BH CV ECHO MEAS - MV P1/2T: 94.8 MSEC
BH CV ECHO MEAS - MV V2 VTI: 33.8 CM
BH CV ECHO MEAS - MVA(P1/2T): 2.32 CM2
BH CV ECHO MEAS - MVA(VTI): 1.65 CM2
BH CV ECHO MEAS - PA ACC TIME: 0.15 SEC
BH CV ECHO MEAS - PA V2 MAX: 82.8 CM/SEC
BH CV ECHO MEAS - PI END-D VEL: 92.3 CM/SEC
BH CV ECHO MEAS - RAP SYSTOLE: 8 MMHG
BH CV ECHO MEAS - RVSP: 28 MMHG
BH CV ECHO MEAS - SV(LVOT): 55.8 ML
BH CV ECHO MEAS - SV(MOD-SP2): 78.1 ML
BH CV ECHO MEAS - SV(MOD-SP4): 96.5 ML
BH CV ECHO MEAS - SVI(LVOT): 29.2 ML/M2
BH CV ECHO MEAS - SVI(MOD-SP2): 40.8 ML/M2
BH CV ECHO MEAS - SVI(MOD-SP4): 50.5 ML/M2
BH CV ECHO MEAS - TAPSE (>1.6): 1.87 CM
BH CV ECHO MEAS - TR MAX PG: 20.2 MMHG
BH CV ECHO MEAS - TR MAX VEL: 224.8 CM/SEC
BH CV ECHO MEASUREMENTS AVERAGE E/E' RATIO: 5.69
BH CV XLRA - RV BASE: 4 CM
BH CV XLRA - RV LENGTH: 8.5 CM
BH CV XLRA - RV MID: 3.1 CM
BH CV XLRA - TDI S': 9.2 CM/SEC
LEFT ATRIUM VOLUME INDEX: 21.4 ML/M2
LV EF 2D ECHO EST: 60 %

## 2024-06-10 PROCEDURE — 93306 TTE W/DOPPLER COMPLETE: CPT | Performed by: INTERNAL MEDICINE

## 2024-06-10 PROCEDURE — 93306 TTE W/DOPPLER COMPLETE: CPT

## 2024-06-12 NOTE — PROGRESS NOTES
"    Cardiology Outpatient Visit      Identification: Christian Mcintosh is a 79 y.o. male who resides in [place], [place].     Reason for visit:  Bioprosthetic AVR  Mild CAD      Subjective      Mr. Mcintosh returns to the office today for 1 year follow-up.  He has been doing well with no cardiovascular symptoms of angina or heart failure.  He did sustain a mechanical fall earlier this year resulting in a hip fracture.  He has been receiving physical therapy for this over the last several months.  He states that he was at work and tripped.  He denies lightheadedness, syncope or near syncope.      ROS    Allergies   Allergen Reactions    Shellfish-Derived Products Anaphylaxis         Current Outpatient Medications   Medication Instructions    aspirin 81 mg, Oral, Daily    B Complex Vitamins (VITAMIN B COMPLEX) capsule capsule 1 capsule, Oral, Daily    Coenzyme Q10 (CO Q 10 PO) 1 capsule, Oral, Daily    DHEA 25 mg, Oral, Daily    fluticasone (FLONASE) 50 MCG/ACT nasal spray 1-2 sprays each nostril daily    levETIRAcetam (KEPPRA) 250 mg, Oral, Daily    lisinopril (PRINIVIL,ZESTRIL) 2.5 mg, Oral, Daily    metFORMIN (GLUCOPHAGE) 500 MG tablet TAKE 1 TABLET BY MOUTH TWICE DAILY WITH MEALS    Multiple Vitamins-Minerals (CENTRUM ADULTS PO) 1 tablet, Oral, Daily    Omega-3 Fatty Acids (FISH OIL PO) 1 tablet, Oral, Daily    pantoprazole (PROTONIX) 40 mg, Oral, Daily    rosuvastatin (CRESTOR) 5 mg, Oral, Nightly    vitamin D3 5,000 Units, Oral, Daily         Objective     /72 (BP Location: Right arm, Patient Position: Sitting, Cuff Size: Adult)   Pulse 64   Ht 167.6 cm (66\")   Wt 77.1 kg (170 lb)   SpO2 95%   BMI 27.44 kg/m²       Constitutional:       Appearance: Healthy appearance.   Eyes:      General: No scleral icterus.  Neck:      Thyroid: No thyroid mass.      Vascular: No carotid bruit or JVD. JVD normal.   Pulmonary:      Effort: Pulmonary effort is normal.      Breath sounds: Normal breath sounds. "   Cardiovascular:      Normal rate. Regular rhythm.      Murmurs: There is no murmur.      No gallop.    Edema:     Peripheral edema absent.   Skin:     General: Skin is warm. There is no cyanosis.   Neurological:      General: No focal deficit present.      Mental Status: Alert.   Psychiatric:         Attention and Perception: Attention normal.         Result Review  (reviewed with patient):            Lab Results   Component Value Date    GLUCOSE 116 (H) 05/06/2024    BUN 24 (H) 05/06/2024    CREATININE 1.11 05/06/2024    EGFRRESULT 85.1 11/06/2023    EGFR 68.8 05/06/2024    BCR 21.6 05/06/2024    K 4.6 05/06/2024    CO2 25.6 05/06/2024    CALCIUM 10.2 05/06/2024    PROTENTOTREF 6.7 11/06/2023    ALBUMIN 4.6 05/06/2024    BILITOT 0.5 05/06/2024    AST 24 05/06/2024    ALT 17 05/06/2024     Lab Results   Component Value Date    WBC 7.94 05/06/2024    HGB 14.7 05/06/2024    HCT 44.3 05/06/2024    MCV 87.7 05/06/2024     05/06/2024     Lab Results   Component Value Date    CHOL 152 05/06/2024    CHLPL 140 11/06/2023    TRIG 181 (H) 05/06/2024    HDL 39 (L) 05/06/2024    LDL 82 05/06/2024     Lab Results   Component Value Date    HGBA1C 6.80 (H) 05/06/2024           Assessment     Diagnoses and all orders for this visit:    1. Status post aortic valve replacement with bioprosthetic valve (Primary)  Overview:  Echocardiogram (08/08/2012): Moderate AS, KLAUDIA of 0.8 cm2.  Echocardiogram (02/26/2013): LVEF 55%, severe AS with mean gradient of 65 mmHg.   Bioprosthetic aortic valve replacement by Dr. Stuart Griffin (03/26/2013): 25-mm Magna Ease with ascending aortoplasty. Left atrial appendage excluded  Echo (6/15/18): LVEF 48%. Normal functioning bioprosthetic aortic valve. Trivial paravalvular leak.  Echo (4/23/2021): Echo unchanged from 6/15/2018  Echo (6/10/2024): LVEF 60%. A #25 Magna Ease bioprosthetic aortic valve is in place and appears to function normally. Mean gradient across about 13 mmHg.     Assessment  & Plan:  Echo shows normal function bioprosthetic aortic valve  Repeat surveillance echo in 2027  Continue low-dose aspirin  Continue SBE prophylaxis prior to dental cleaning      2. Coronary artery disease involving native coronary artery of native heart without angina pectoris  Overview:  Cardiac catheterization (03/06/2013): mild CAD    Assessment & Plan:  No angina  Continue low-dose aspirin and statin therapy       3. Essential hypertension  Overview:  Target blood pressure <130/80 mmHg    Assessment & Plan:  Blood pressure medications have been de-escalated due to symptomatic hypotension since last visit  Presently on modest lisinopril dose with normal blood pressure today      4. Type 2 diabetes mellitus without complication, without long-term current use of insulin  Assessment & Plan:  Well-controlled  Continue metformin and lisinopril            Plan   Continue present medical therapy      Follow-up   Return in about 1 year (around 6/14/2025).        Baljinder Spangler MD, FACC, [facility]  6/14/2024

## 2024-06-14 ENCOUNTER — OFFICE VISIT (OUTPATIENT)
Dept: CARDIOLOGY | Facility: CLINIC | Age: 79
End: 2024-06-14
Payer: MEDICARE

## 2024-06-14 VITALS
HEIGHT: 66 IN | HEART RATE: 64 BPM | BODY MASS INDEX: 27.32 KG/M2 | DIASTOLIC BLOOD PRESSURE: 72 MMHG | WEIGHT: 170 LBS | OXYGEN SATURATION: 95 % | SYSTOLIC BLOOD PRESSURE: 120 MMHG

## 2024-06-14 DIAGNOSIS — Z95.3 STATUS POST AORTIC VALVE REPLACEMENT WITH BIOPROSTHETIC VALVE: Primary | Chronic | ICD-10-CM

## 2024-06-14 DIAGNOSIS — I10 ESSENTIAL HYPERTENSION: Chronic | ICD-10-CM

## 2024-06-14 DIAGNOSIS — I25.10 CORONARY ARTERY DISEASE INVOLVING NATIVE CORONARY ARTERY OF NATIVE HEART WITHOUT ANGINA PECTORIS: ICD-10-CM

## 2024-06-14 DIAGNOSIS — E11.9 TYPE 2 DIABETES MELLITUS WITHOUT COMPLICATION, WITHOUT LONG-TERM CURRENT USE OF INSULIN: ICD-10-CM

## 2024-06-14 NOTE — ASSESSMENT & PLAN NOTE
Blood pressure medications have been de-escalated due to symptomatic hypotension since last visit  Presently on modest lisinopril dose with normal blood pressure today

## 2024-06-14 NOTE — ASSESSMENT & PLAN NOTE
LDL slightly above goal  Statin dose decreased due to balance issues  Continue rosuvastatin 5 mg nightly

## 2024-06-14 NOTE — ASSESSMENT & PLAN NOTE
Echo shows normal function bioprosthetic aortic valve  Repeat surveillance echo in 2027  Continue low-dose aspirin  Continue SBE prophylaxis prior to dental cleaning

## 2024-06-23 DIAGNOSIS — E78.5 HYPERLIPIDEMIA LDL GOAL <70: ICD-10-CM

## 2024-06-24 RX ORDER — ROSUVASTATIN CALCIUM 5 MG/1
5 TABLET, COATED ORAL NIGHTLY
Qty: 90 TABLET | Refills: 3 | Status: SHIPPED | OUTPATIENT
Start: 2024-06-24

## 2024-06-24 NOTE — TELEPHONE ENCOUNTER
Lab Results   Component Value Date    GLUCOSE 116 (H) 05/06/2024    BUN 24 (H) 05/06/2024    CREATININE 1.11 05/06/2024    EGFRRESULT 85.1 11/06/2023    EGFR 68.8 05/06/2024    BCR 21.6 05/06/2024    K 4.6 05/06/2024    CO2 25.6 05/06/2024    CALCIUM 10.2 05/06/2024    PROTENTOTREF 6.7 11/06/2023    ALBUMIN 4.6 05/06/2024    BILITOT 0.5 05/06/2024    AST 24 05/06/2024    ALT 17 05/06/2024      Lab Results   Component Value Date    CHOL 152 05/06/2024    CHLPL 140 11/06/2023    TRIG 181 (H) 05/06/2024    HDL 39 (L) 05/06/2024    LDL 82 05/06/2024

## 2024-06-26 DIAGNOSIS — E11.9 TYPE 2 DIABETES MELLITUS WITHOUT COMPLICATION, WITHOUT LONG-TERM CURRENT USE OF INSULIN: ICD-10-CM

## 2024-06-26 RX ORDER — LISINOPRIL 2.5 MG/1
2.5 TABLET ORAL DAILY
Qty: 90 TABLET | Refills: 3 | Status: SHIPPED | OUTPATIENT
Start: 2024-06-26

## 2024-07-01 RX ORDER — PANTOPRAZOLE SODIUM 40 MG/1
40 TABLET, DELAYED RELEASE ORAL DAILY
Qty: 90 TABLET | Refills: 3 | Status: SHIPPED | OUTPATIENT
Start: 2024-07-01

## 2024-08-21 PROBLEM — R50.9 FEVER: Status: ACTIVE | Noted: 2024-08-21

## 2024-09-09 DIAGNOSIS — E11.9 TYPE 2 DIABETES MELLITUS WITHOUT COMPLICATION, WITHOUT LONG-TERM CURRENT USE OF INSULIN: ICD-10-CM

## 2024-09-20 NOTE — ASSESSMENT & PLAN NOTE
First-degree AV block with current EKG showing LA interval of 0.216 as compared to pursue AV block of 0.235 October 19, 2022 asymptomatic   Patient

## 2024-11-07 ENCOUNTER — TELEPHONE (OUTPATIENT)
Dept: INTERNAL MEDICINE | Facility: CLINIC | Age: 79
End: 2024-11-07
Payer: MEDICARE

## 2024-11-07 DIAGNOSIS — I25.10 CORONARY ARTERY DISEASE INVOLVING NATIVE CORONARY ARTERY OF NATIVE HEART WITHOUT ANGINA PECTORIS: ICD-10-CM

## 2024-11-07 DIAGNOSIS — E11.9 TYPE 2 DIABETES MELLITUS WITHOUT COMPLICATION, WITHOUT LONG-TERM CURRENT USE OF INSULIN: ICD-10-CM

## 2024-11-07 DIAGNOSIS — Z12.5 ENCOUNTER FOR SCREENING PROSTATE SPECIFIC ANTIGEN (PSA) MEASUREMENT: ICD-10-CM

## 2024-11-07 DIAGNOSIS — E78.5 HYPERLIPIDEMIA LDL GOAL <70: Primary | ICD-10-CM

## 2024-11-07 DIAGNOSIS — I10 ESSENTIAL HYPERTENSION: Chronic | ICD-10-CM

## 2024-11-07 NOTE — TELEPHONE ENCOUNTER
Caller: Christian Mcintosh    Relationship: Self    Best call back number: [phone]     What orders are you requesting (i.e. lab or imaging): LABS    In what timeframe would the patient need to come in: 11/11    Where will you receive your lab/imaging services: [facility]      Additional notes:

## 2024-11-11 ENCOUNTER — LAB (OUTPATIENT)
Dept: LAB | Facility: HOSPITAL | Age: 79
End: 2024-11-11
Payer: MEDICARE

## 2024-11-11 DIAGNOSIS — E11.9 TYPE 2 DIABETES MELLITUS WITHOUT COMPLICATION, WITHOUT LONG-TERM CURRENT USE OF INSULIN: ICD-10-CM

## 2024-11-11 DIAGNOSIS — I25.10 CORONARY ARTERY DISEASE INVOLVING NATIVE CORONARY ARTERY OF NATIVE HEART WITHOUT ANGINA PECTORIS: ICD-10-CM

## 2024-11-11 DIAGNOSIS — I10 ESSENTIAL HYPERTENSION: ICD-10-CM

## 2024-11-11 DIAGNOSIS — E78.5 HYPERLIPIDEMIA LDL GOAL <70: ICD-10-CM

## 2024-11-11 DIAGNOSIS — Z12.5 ENCOUNTER FOR SCREENING PROSTATE SPECIFIC ANTIGEN (PSA) MEASUREMENT: ICD-10-CM

## 2024-11-11 LAB
ALBUMIN SERPL-MCNC: 4.6 G/DL (ref 3.5–5.2)
ALBUMIN UR-MCNC: 1.8 MG/DL
ALBUMIN/GLOB SERPL: 1.8 G/DL
ALP SERPL-CCNC: 74 U/L (ref 39–117)
ALT SERPL W P-5'-P-CCNC: 19 U/L (ref 1–41)
ANION GAP SERPL CALCULATED.3IONS-SCNC: 10.4 MMOL/L (ref 5–15)
AST SERPL-CCNC: 27 U/L (ref 1–40)
BASOPHILS # BLD AUTO: 0.1 10*3/MM3 (ref 0–0.2)
BASOPHILS NFR BLD AUTO: 1.3 % (ref 0–1.5)
BILIRUB SERPL-MCNC: 0.5 MG/DL (ref 0–1.2)
BUN SERPL-MCNC: 19 MG/DL (ref 8–23)
BUN/CREAT SERPL: 18.6 (ref 7–25)
CALCIUM SPEC-SCNC: 10.1 MG/DL (ref 8.6–10.5)
CHLORIDE SERPL-SCNC: 106 MMOL/L (ref 98–107)
CHOLEST SERPL-MCNC: 152 MG/DL (ref 0–200)
CO2 SERPL-SCNC: 25.6 MMOL/L (ref 22–29)
CREAT SERPL-MCNC: 1.02 MG/DL (ref 0.76–1.27)
CREAT UR-MCNC: 222.3 MG/DL
DEPRECATED RDW RBC AUTO: 40 FL (ref 37–54)
EGFRCR SERPLBLD CKD-EPI 2021: 75.7 ML/MIN/1.73
EOSINOPHIL # BLD AUTO: 0.5 10*3/MM3 (ref 0–0.4)
EOSINOPHIL NFR BLD AUTO: 6.7 % (ref 0.3–6.2)
ERYTHROCYTE [DISTWIDTH] IN BLOOD BY AUTOMATED COUNT: 12.4 % (ref 12.3–15.4)
GLOBULIN UR ELPH-MCNC: 2.5 GM/DL
GLUCOSE SERPL-MCNC: 119 MG/DL (ref 65–99)
HBA1C MFR BLD: 6.8 % (ref 4.8–5.6)
HCT VFR BLD AUTO: 43.5 % (ref 37.5–51)
HDLC SERPL-MCNC: 38 MG/DL (ref 40–60)
HGB BLD-MCNC: 14.8 G/DL (ref 13–17.7)
IMM GRANULOCYTES # BLD AUTO: 0.02 10*3/MM3 (ref 0–0.05)
IMM GRANULOCYTES NFR BLD AUTO: 0.3 % (ref 0–0.5)
LDLC SERPL CALC-MCNC: 87 MG/DL (ref 0–100)
LDLC/HDLC SERPL: 2.18 {RATIO}
LYMPHOCYTES # BLD AUTO: 2.5 10*3/MM3 (ref 0.7–3.1)
LYMPHOCYTES NFR BLD AUTO: 33.4 % (ref 19.6–45.3)
MCH RBC QN AUTO: 30.3 PG (ref 26.6–33)
MCHC RBC AUTO-ENTMCNC: 34 G/DL (ref 31.5–35.7)
MCV RBC AUTO: 89.1 FL (ref 79–97)
MICROALBUMIN/CREAT UR: 8.1 MG/G (ref 0–29)
MONOCYTES # BLD AUTO: 0.7 10*3/MM3 (ref 0.1–0.9)
MONOCYTES NFR BLD AUTO: 9.3 % (ref 5–12)
NEUTROPHILS NFR BLD AUTO: 3.67 10*3/MM3 (ref 1.7–7)
NEUTROPHILS NFR BLD AUTO: 49 % (ref 42.7–76)
NRBC BLD AUTO-RTO: 0 /100 WBC (ref 0–0.2)
PLATELET # BLD AUTO: 198 10*3/MM3 (ref 140–450)
PMV BLD AUTO: 11.8 FL (ref 6–12)
POTASSIUM SERPL-SCNC: 4.4 MMOL/L (ref 3.5–5.2)
PROT SERPL-MCNC: 7.1 G/DL (ref 6–8.5)
PSA SERPL-MCNC: 1.12 NG/ML (ref 0–4)
RBC # BLD AUTO: 4.88 10*6/MM3 (ref 4.14–5.8)
SODIUM SERPL-SCNC: 142 MMOL/L (ref 136–145)
TRIGL SERPL-MCNC: 155 MG/DL (ref 0–150)
TSH SERPL DL<=0.05 MIU/L-ACNC: 1.87 UIU/ML (ref 0.27–4.2)
VLDLC SERPL-MCNC: 27 MG/DL (ref 5–40)
WBC NRBC COR # BLD AUTO: 7.49 10*3/MM3 (ref 3.4–10.8)

## 2024-11-11 PROCEDURE — 80053 COMPREHEN METABOLIC PANEL: CPT

## 2024-11-11 PROCEDURE — 82043 UR ALBUMIN QUANTITATIVE: CPT

## 2024-11-11 PROCEDURE — 84443 ASSAY THYROID STIM HORMONE: CPT

## 2024-11-11 PROCEDURE — 82570 ASSAY OF URINE CREATININE: CPT

## 2024-11-11 PROCEDURE — 85025 COMPLETE CBC W/AUTO DIFF WBC: CPT

## 2024-11-11 PROCEDURE — 83036 HEMOGLOBIN GLYCOSYLATED A1C: CPT

## 2024-11-11 PROCEDURE — G0103 PSA SCREENING: HCPCS

## 2024-11-11 PROCEDURE — 80061 LIPID PANEL: CPT

## 2024-11-17 PROBLEM — Z00.00 MEDICARE ANNUAL WELLNESS VISIT, SUBSEQUENT: Status: ACTIVE | Noted: 2024-11-17

## 2024-11-21 ENCOUNTER — OFFICE VISIT (OUTPATIENT)
Dept: INTERNAL MEDICINE | Facility: CLINIC | Age: 79
End: 2024-11-21
Payer: MEDICARE

## 2024-11-21 VITALS
WEIGHT: 181 LBS | SYSTOLIC BLOOD PRESSURE: 140 MMHG | HEART RATE: 70 BPM | DIASTOLIC BLOOD PRESSURE: 88 MMHG | HEIGHT: 66 IN | BODY MASS INDEX: 29.09 KG/M2 | OXYGEN SATURATION: 96 %

## 2024-11-21 DIAGNOSIS — Z95.3 STATUS POST AORTIC VALVE REPLACEMENT WITH BIOPROSTHETIC VALVE: Chronic | ICD-10-CM

## 2024-11-21 DIAGNOSIS — E78.5 HYPERLIPIDEMIA LDL GOAL <70: ICD-10-CM

## 2024-11-21 DIAGNOSIS — Z00.00 MEDICARE ANNUAL WELLNESS VISIT, SUBSEQUENT: Primary | ICD-10-CM

## 2024-11-21 DIAGNOSIS — R53.83 OTHER FATIGUE: ICD-10-CM

## 2024-11-21 DIAGNOSIS — E11.9 TYPE 2 DIABETES MELLITUS WITHOUT COMPLICATION, WITHOUT LONG-TERM CURRENT USE OF INSULIN: ICD-10-CM

## 2024-11-21 DIAGNOSIS — M15.0 PRIMARY OSTEOARTHRITIS INVOLVING MULTIPLE JOINTS: Chronic | ICD-10-CM

## 2024-11-21 DIAGNOSIS — I25.10 CORONARY ARTERY DISEASE INVOLVING NATIVE CORONARY ARTERY OF NATIVE HEART WITHOUT ANGINA PECTORIS: ICD-10-CM

## 2024-11-21 DIAGNOSIS — I10 ESSENTIAL HYPERTENSION: Chronic | ICD-10-CM

## 2024-11-21 DIAGNOSIS — K21.9 CHRONIC GERD: ICD-10-CM

## 2024-11-21 DIAGNOSIS — G40.409 GRAND MAL SEIZURE: ICD-10-CM

## 2024-11-21 RX ORDER — LISINOPRIL 5 MG/1
5 TABLET ORAL DAILY
Qty: 90 TABLET | Refills: 3 | Status: SHIPPED | OUTPATIENT
Start: 2024-11-21

## 2024-11-21 NOTE — ASSESSMENT & PLAN NOTE
Patient complains of mild increase in fatigue denies chest pain or pressure denies cough wheeze or shortness of breath he states he has not been exercising and is primarily doing housework duties including caring for his spouse all lab is acceptable and suggest no change in therapy  To [facility]

## 2024-11-21 NOTE — ASSESSMENT & PLAN NOTE
Degenerative arthritis disease of bilateral knees bilateral shoulder remote meniscus repair left and right knee with physical plasty injections in the right knee and left shoulder replacement.  On Tylenol and NSAID therapy as needed continue therapy

## 2024-11-21 NOTE — ASSESSMENT & PLAN NOTE
Essential hypertension with blood pressures 140/88 left and right the patient is recently been on half dose of lisinopril or 2.5 mg daily suggest increase lisinopril to 5 mg daily.

## 2024-11-21 NOTE — ASSESSMENT & PLAN NOTE
Mixed hyperlipidemia on Crestor 5 mg every evening denies myalgia arthralgia continue therapy  November 11, 2024 cholesterol 152 LDL 87 HDL 38 triglycerides 155  Current EKG shows sinus rhythm first-degree AV block with incomplete bundle branch block with nonspecific T wave changes similar to EKG of November 16, 2023

## 2024-11-21 NOTE — ASSESSMENT & PLAN NOTE
Previous cardiac catheterization 2013 showing mild coronary disease with the patient denying chest pain pressure denies or palpitations

## 2024-11-21 NOTE — PROGRESS NOTES
QUICK REFERENCE INFORMATION:  The ABCs of the Annual Wellness Visit    Subsequent Medicare Wellness Visit    HEALTH RISK ASSESSMENT    1945    Recent Hospitalizations:  No hospitalization(s) within the last year..        Current Medical Providers:  Patient Care Team:  Merrill De La Paz MD as PCP - General (Internal Medicine)  Marshal Spangler IV, MD as Cardiologist (Interventional Cardiology)  Silvio Ahn MD as Consulting Physician (Urology)        Smoking Status:  Social History     Tobacco Use   Smoking Status Former    Current packs/day: 0.00    Average packs/day: 2.0 packs/day for 23.1 years (46.1 ttl pk-yrs)    Types: Cigarettes    Start date: 1963    Quit date: 1986    Years since quittin.9   Smokeless Tobacco Never       Alcohol Consumption:  Social History     Substance and Sexual Activity   Alcohol Use Yes    Alcohol/week: 1.0 standard drink of alcohol    Types: 1 Shots of liquor per week    Comment: occasional        Depression Screen:       2024     9:44 AM   PHQ-2/PHQ-9 Depression Screening   Little interest or pleasure in doing things Not at all   Feeling down, depressed, or hopeless Not at all   How difficult have these problems made it for you to do your work, take care of things at home, or get along with other people? Not difficult at all       Health Habits and Functional and Cognitive Screenin/16/2024     8:12 AM   Functional & Cognitive Status   Do you have difficulty preparing food and eating? No    Do you have difficulty bathing yourself, getting dressed or grooming yourself? No    Do you have difficulty using the toilet? No    Do you have difficulty moving around from place to place? No    Do you have trouble with steps or getting out of a bed or a chair? No    Current Diet Frequent Junk Food    Dental Exam Up to date    Eye Exam Up to date    Exercise (times per week) 0 times per week    Current Exercises Include No Regular Exercise    Do you  need help using the phone?  No    Are you deaf or do you have serious difficulty hearing?  No    Do you need help to go to places out of walking distance? No    Do you need help shopping? No    Do you need help preparing meals?  No    Do you need help with housework?  No    Do you need help with laundry? No    Do you need help taking your medications? No    Do you need help managing money? No    Do you ever drive or ride in a car without wearing a seat belt? No    Have you felt unusual stress, anger or loneliness in the last month? No    Who do you live with? Spouse    If you need help, do you have trouble finding someone available to you? No    Have you been bothered in the last four weeks by sexual problems? Yes    Do you have difficulty concentrating, remembering or making decisions? No        Patient-reported       Fall Risk Screen:  BEBETO Fall Risk Assessment was completed, and patient is at LOW risk for falls.Assessment completed on:11/21/2024    ACE III MINI        Does the patient have evidence of cognitive impairment? No    Aspirin use counseling: Taking ASA appropriately as indicated    Recent Lab Results:  CMP:  Lab Results   Component Value Date    Glucose 119 (H) 11/11/2024    Glucose 108 01/03/2018    Glucose, UA Negative 09/13/2018    BUN 19 11/11/2024    BUN/Creatinine Ratio 18.6 11/11/2024    Creatinine 1.02 11/11/2024    Creatinine, Urine 222.3 11/11/2024    Ketones, UA Negative 09/13/2018    CO2 25.6 11/11/2024    Calcium 10.1 11/11/2024    Albumin 4.6 11/11/2024    AST (SGOT) 27 11/11/2024    ALT (SGPT) 19 11/11/2024     HbA1c:  Lab Results   Component Value Date    HGBA1C 6.80 (H) 11/11/2024    HGBA1C 6.80 (H) 05/06/2024     Microalbumin:  Lab Results   Component Value Date    MICROALBUR 1.8 11/11/2024     Lipid Panel  Lab Results   Component Value Date    CHOL 152 11/11/2024    TRIG 155 (H) 11/11/2024    HDL 38 (L) 11/11/2024    LDL 87 11/11/2024    AST 27 11/11/2024    ALT 19 11/11/2024        Visual Acuity:  No results found.    Age-appropriate Screening Schedule:  Refer to the list below for future screening recommendations based on patient's age, sex and/or medical conditions. Orders for these recommended tests are listed in the plan section. The patient has been provided with a written plan.    Health Maintenance   Topic Date Due    ZOSTER VACCINE (2 of 3) 06/05/2015    HEPATITIS C SCREENING  Never done    DIABETIC FOOT EXAM  04/10/2020    TDAP/TD VACCINES (2 - Td or Tdap) 01/10/2021    DIABETIC EYE EXAM  10/05/2021    RSV Vaccine - Adults (1 - 1-dose 75+ series) Never done    COVID-19 Vaccine (5 - 2024-25 season) 09/01/2024    HEMOGLOBIN A1C  05/11/2025    BMI FOLLOWUP  05/15/2025    LIPID PANEL  11/11/2025    ANNUAL WELLNESS VISIT  11/21/2025    COLORECTAL CANCER SCREENING  02/15/2032    INFLUENZA VACCINE  Completed    Pneumococcal Vaccine 65+  Completed    URINE MICROALBUMIN  Discontinued        Results  Laboratory Studies  Liver function is normal. Kidney function is normal. Blood sugar average is 6.80. Glucose is 119. PSA is 1.120. Cholesterol is 152. Bad cholesterol is 87. Triglycerides were high by about five points. Good cholesterol is 38.    Imaging  Echocardiogram shows a pumping function of the heart at 60%.    Subjective   History of Present Illness  The patient is a 79-year-old male who presents for a Medicare annual wellness visit and physical exam, complaining of fatigue for the last 3 weeks.    He reports a lack of energy and a need for afternoon naps. His sleep schedule is from 10 or 11 PM to 6 or 7 AM, but he often wakes up during the night. He uses Tylenol PM to aid his sleep, which he finds beneficial. However, he experiences grogginess when he takes NyQuil for cold symptoms. He has not tried melatonin. He has stopped exercising due to his wife's health issues and a hip fracture he sustained in 02/2024. He has noticed that he feels less sharp the morning after taking  ibuprofen PM or [employer] brand sleep aid.    He experiences lightheadedness occasionally but does not report any dizziness or falls. He has some difficulty raising his arm and experiences knee pain after walking. He also reports occasional foggy vision, despite having had cataract surgery and YAG laser treatment. He has tinnitus and was advised to get hearing aids.    He does not report any nosebleeds, neck trouble, swallowing difficulties, choking, lung issues, coughing, wheezing, shortness of breath, recent lung infections, chest pain, heart skipping, palpitations, stomach issues, nausea, vomiting, diarrhea, or constipation. He has an itching sensation in his back that is relieved by Preparation H. He has no bladder issues but occasionally wakes up at night to urinate. He drinks one cup of coffee in the morning. He has noticed a slow urine stream at times. He does not report any burning or stinging in his feet.    He is currently on lisinopril 2.5 mg, reduced from 5 mg due to a previous dizzy spell.    He has a dentist appointment on 12/05/2024.    IMMUNIZATIONS  He received influenza vaccine today.    Christian Mcintosh is a 79 y.o. male who presents for a Subsequent Wellness Visit.    CHRONIC CONDITIONS    The following portions of the patient's history were reviewed and updated as appropriate: allergies, current medications, past family history, past medical history, past social history, and past surgical history.    Outpatient Medications Prior to Visit   Medication Sig Dispense Refill    aspirin 81 MG EC tablet Take 1 tablet by mouth Daily. 90 tablet 3    B Complex Vitamins (VITAMIN B COMPLEX) capsule capsule Take 1 capsule by mouth Daily.      Cholecalciferol (VITAMIN D3) 5000 units capsule capsule Take 1 capsule by mouth Daily.      Coenzyme Q10 (CO Q 10 PO) Take 1 capsule by mouth Daily.      DHEA 25 MG capsule Take 25 mg by mouth Daily.      levETIRAcetam (KEPPRA) 500 MG tablet TAKE 1/2 TABLET BY MOUTH DAILY  45 tablet 3    metFORMIN (GLUCOPHAGE) 500 MG tablet TAKE 1 TABLET BY MOUTH TWICE DAILY WITH MEALS 180 tablet 1    Multiple Vitamins-Minerals (CENTRUM ADULTS PO) Take 1 tablet by mouth Daily.      Omega-3 Fatty Acids (FISH OIL PO) Take 1 tablet by mouth Daily.      pantoprazole (PROTONIX) 40 MG EC tablet TAKE 1 TABLET BY MOUTH DAILY 90 tablet 3    rosuvastatin (CRESTOR) 5 MG tablet TAKE 1 TABLET BY MOUTH EVERY NIGHT 90 tablet 3    lisinopril (PRINIVIL,ZESTRIL) 2.5 MG tablet TAKE 1 TABLET BY MOUTH DAILY 90 tablet 3    fluticasone (FLONASE) 50 MCG/ACT nasal spray 1-2 sprays each nostril daily 16 g 0     No facility-administered medications prior to visit.       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    Testicular hypofunction    First degree AV block    BPH with urinary obstruction    Chronic GERD    Primary osteoarthritis of both knees    Health counseling    Osteoarthritis    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 Patient has an advance directive (not in EMR), copy requested.      Identification of Risk  Factors:  Risk factors include: Cardiovascular risk.    Review of Systems   Constitutional:  Positive for fatigue.   HENT:  Positive for hearing loss and tinnitus.    Eyes:  Positive for visual disturbance.        Remote cataract surgery and YAG surgery   Respiratory: Negative.     Cardiovascular: Negative.    Gastrointestinal: Negative.         Itching hemorrhoids   Endocrine: Negative.    Genitourinary: Negative.    Musculoskeletal:  Positive for arthralgias, back pain and gait problem.   Skin: Negative.    Allergic/Immunologic: Negative.    Hematological: Negative.    Psychiatric/Behavioral:  Positive for sleep disturbance.      Physical Exam  Eyes appear normal and clear. Nose appears normal. Ears are free of wax and clear.  Neck examination reveals no masses, tenderness, or lymph glands.  Heart rhythm is regular. Heart rate is normal. Heart sounds are good.  Liver and spleen are normal. Abdomen examination is normal.  Feet are warm with good circulation in both right and left foot.    Vital Signs  Blood pressure measures 136/84. Oxygen concentration is at 96 percent.    Compared to one year ago, the patient feels his physical health is the same.  Compared to one year ago, the patient feels his mental health is the same.    Objective     Physical Exam  Constitutional:       Appearance: Normal appearance.   HENT:      Head: Normocephalic.      Right Ear: Tympanic membrane and ear canal normal.      Left Ear: Tympanic membrane and ear canal normal.      Nose: Nose normal.      Mouth/Throat:      Mouth: Mucous membranes are moist.      Pharynx: Oropharynx is clear.   Eyes:      Extraocular Movements: Extraocular movements intact.      Conjunctiva/sclera: Conjunctivae normal.      Pupils: Pupils are equal, round, and reactive to light.   Cardiovascular:      Rate and Rhythm: Normal rate and regular rhythm.      Pulses: Normal pulses.      Heart sounds: Normal heart sounds.   Pulmonary:      Effort: Pulmonary effort  is normal.      Breath sounds: Normal breath sounds.   Abdominal:      General: Abdomen is flat. Bowel sounds are normal.      Palpations: Abdomen is soft.   Musculoskeletal:      Cervical back: Normal range of motion and neck supple.      Comments: Mild reduced range of motion both shoulders hips and knees   Skin:     General: Skin is warm and dry.      Capillary Refill: Capillary refill takes less than 2 seconds.   Neurological:      General: No focal deficit present.      Mental Status: He is alert and oriented to person, place, and time. Mental status is at baseline.   Psychiatric:         Mood and Affect: Mood normal.            ECG 12 Lead    Date/Time: 11/21/2024 5:19 PM  Performed by: Merrill De La Paz MD    Authorized by: Merrill De La Paz MD  Comparison: compared with previous ECG from 11/16/2023  Comparison to previous ECG: Sinus rhythm first-degree AV block and incomplete interventricular conduction delay with nonspecific T wave changes similar to EKG of November 16, 2023 asymptomatic  Rhythm: sinus rhythm  Rate: normal  Conduction: 1st degree AV block and non-specific intraventricular conduction delay  ST Segments: ST segments normal  QRS axis: normal    Clinical impression: abnormal EKG  Comments: Current EKG sinus rhythm first-degree AV block incomplete bundle branch block and nonspecific T wave changes similar to EKG of November 16, 2023       Patient Wellness Counseling:   Plan of care reviewed with patient at the conclusion of today's visit. Education was provided in regards to diagnosis, diet and exercise, prostate cancer screening discussed including benefit of early detection, potential need for follow-up, and harms associated with additional management. Patient agrees to screening.    Nutrition, physical activity, healthy weight,ways to reduce stress, adequate sleep, injury prevention, misuse of tobacco, alcohol and drugs, sexual behavior and STD's, dental health, mental health, and  "immunizations.    Plan of care reviewed with patient at the conclusion of today's visit. Education was provided regarding diagnosis, management and any prescribed or recommended OTC medications.  Patient verbalizes understanding of and agreement with management plan.      Vitals:    11/21/24 0940   BP: 140/88   BP Location: Right arm   Patient Position: Sitting   Cuff Size: Adult   Pulse: 70   SpO2: 96%   Weight: 82.1 kg (181 lb)   Height: 167.6 cm (66\")   PainSc: 0-No pain              Assessment & Plan   Assessment & Plan  1. Fatigue.  The patient reports experiencing fatigue for the last 3 weeks. His lab results indicate no anemia, with normal liver and kidney function. His blood sugar average is slightly elevated at 6.80, and glucose levels are at 119. His echocardiogram from June 2024 shows a normal heart pumping function at 60 percent. His EKG remains unchanged from the previous year. He is advised to take Tylenol PM and melatonin 5 mg an hour before bedtime to improve sleep quality. Regular exercise is recommended to help alleviate fatigue.    2. Type 2 Diabetes Mellitus.  His blood sugar average is slightly elevated at 6.80, and glucose levels are at 119. He is currently on metformin twice a day. He is advised to continue his current medication regimen and maintain a balanced diet.    3. Essential Hypertension.  His blood pressure is slightly elevated. He is currently on lisinopril 2.5 mg. The lisinopril dosage will be increased to 5 mg, and a new prescription will be sent to [facility].    4. Mixed Hyperlipidemia.  His cholesterol level is excellent at 152, with LDL at 87 and HDL slightly low at 38. His triglycerides are elevated by about 5 points, likely due to his mild diabetes. He is advised to continue his current medication regimen and maintain a healthy diet.    5. Degenerative Arthritis Disease.  He reports that his knees bother him if he walks a lot. He is advised to wear knee supports and engage in " regular exercise to help manage symptoms.    6. 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  No new seizures reported. He is advised to continue his current medication regimen and follow up as needed.    7. Chronic GERD.  No new symptoms reported. He is advised to continue his current medication regimen and follow up as needed.    8. Health Maintenance.  He is scheduled to receive the COVID-19 vaccine on 12/01/2024 and the shingles vaccine after the holidays. He has a dentist appointment on 12/05/2024 for an evaluation and possible x-rays.    Follow-up  Return in 6 months for follow up.    Problem List Items Addressed This Visit          Cardiac and Vasculature    Status post aortic valve replacement with bioprosthetic valve (Chronic)    Overview     Echocardiogram (08/08/2012): Moderate AS, KLAUDIA of 0.8 cm2.  Echocardiogram (02/26/2013): LVEF 55%, severe AS with mean gradient of 65 mmHg.   Bioprosthetic aortic valve replacement by Dr. Stuart Griffin (03/26/2013): 25-mm Magna Ease with ascending aortoplasty. Left atrial appendage excluded  Echo (6/15/18): LVEF 48%. Normal functioning bioprosthetic aortic valve. Trivial paravalvular leak.  Echo (4/23/2021): Echo unchanged from 6/15/2018  Echo (6/10/2024): LVEF 60%. A #25 Magna Ease bioprosthetic aortic valve is in place and appears to function normally. Mean gradient across about 13 mmHg.          Current Assessment & Plan     Aortic valve replacement March 2013 currently stable         Relevant Medications    aspirin 81 MG EC tablet    Other Relevant Orders    ECG 12 Lead    Essential hypertension (Chronic)    Overview     Target blood pressure <130/80 mmHg         Current Assessment & Plan     Essential hypertension with blood pressures 140/88 left and right the patient is recently been on half dose of lisinopril or 2.5 mg daily suggest increase lisinopril to 5 mg daily.         Relevant Medications    lisinopril (PRINIVIL,ZESTRIL) 5 MG tablet    Other Relevant Orders    ECG 12  Lead    Coronary artery disease involving native coronary artery of native heart without angina pectoris    Overview     Cardiac catheterization (03/06/2013): mild CAD         Current Assessment & Plan     Previous cardiac catheterization 2013 showing mild coronary disease with the patient denying chest pain pressure denies or palpitations         Relevant Medications    aspirin 81 MG EC tablet    Hyperlipidemia LDL goal <70    Overview     Statin therapy indicated given presence of coronary artery disease         Current Assessment & Plan      Mixed hyperlipidemia on Crestor 5 mg every evening denies myalgia arthralgia continue therapy  November 11, 2024 cholesterol 152 LDL 87 HDL 38 triglycerides 155  Current EKG shows sinus rhythm first-degree AV block with incomplete bundle branch block with nonspecific T wave changes similar to EKG of November 16, 2023         Relevant Medications    rosuvastatin (CRESTOR) 5 MG tablet       Endocrine and Metabolic    Type 2 diabetes mellitus without complication, without long-term current use of insulin    Overview     Diabetes type 2 on metformin 500 mg twice daily with lab work on 11/11/2024 A1c 6.80 and fasting blood sugar 119 with GFR 75 and creatinine of 1.02 continue therapy and encouraged healthy cardiac healthy diabetic reduce carb diet         Relevant Medications    metFORMIN (GLUCOPHAGE) 500 MG tablet    lisinopril (PRINIVIL,ZESTRIL) 5 MG tablet       Gastrointestinal Abdominal     Chronic GERD    Overview     History of chronic GERD and gastritis on Protonix 40 mg daily         Current Assessment & Plan     Chronic GERD gastritis on pantoprazole 40 mg daily continue therapy         Relevant Medications    pantoprazole (PROTONIX) 40 MG EC tablet       Health Encounters    Medicare annual wellness visit, subsequent - Primary    Overview     79-year-old male presents for Medicare annual wellness visit and physical examination on November 21, 2024             Musculoskeletal and Injuries    Osteoarthritis (Chronic)    Overview     History of bilateral knee arthritis and bilateral shoulder arthritis status post repair meniscus left and right knee, and injection of Synvisc right knee last injection was approximately 1 year ago with a history of left shoulder replacement and current right shoulder discomfort pain the patient is on PRN ibuprofen         Current Assessment & Plan     Degenerative arthritis disease of bilateral knees bilateral shoulder remote meniscus repair left and right knee with physical plasty injections in the right knee and left shoulder replacement.  On Tylenol and NSAID therapy as needed continue therapy            Neuro    Grand mal seizure    Overview     Patient is currently on Keppra for seizure with medication having been reduced to 250 mg daily is followed by neurology last seizure was approximately 4 years ago.         Current Assessment & Plan     Grand mal seizure with last seizure 2015 on Keppra 500 mg-half tablet or 250 mg daily continue therapy            Symptoms and Signs    Other fatigue    Overview     Patient complains of fatigue for the past 3 weeks seems to be worse         Current Assessment & Plan     Patient complains of mild increase in fatigue denies chest pain or pressure denies cough wheeze or shortness of breath he states he has not been exercising and is primarily doing housework duties including caring for his spouse all lab is acceptable and suggest no change in therapy  To encourage resuming [facility]          Patient Self-Management and Personalized Health Advice  The patient has been provided with information about: diet, exercise, weight management, and prevention of cardiac or vascular disease and preventive services including:   Annual Wellness Visit (AWV).    Outpatient Encounter Medications as of 11/21/2024   Medication Sig Dispense Refill    aspirin 81 MG EC tablet Take 1 tablet by mouth  Daily. 90 tablet 3    B Complex Vitamins (VITAMIN B COMPLEX) capsule capsule Take 1 capsule by mouth Daily.      Cholecalciferol (VITAMIN D3) 5000 units capsule capsule Take 1 capsule by mouth Daily.      Coenzyme Q10 (CO Q 10 PO) Take 1 capsule by mouth Daily.      DHEA 25 MG capsule Take 25 mg by mouth Daily.      levETIRAcetam (KEPPRA) 500 MG tablet TAKE 1/2 TABLET BY MOUTH DAILY 45 tablet 3    lisinopril (PRINIVIL,ZESTRIL) 5 MG tablet Take 1 tablet by mouth Daily. 90 tablet 3    metFORMIN (GLUCOPHAGE) 500 MG tablet TAKE 1 TABLET BY MOUTH TWICE DAILY WITH MEALS 180 tablet 1    Multiple Vitamins-Minerals (CENTRUM ADULTS PO) Take 1 tablet by mouth Daily.      Omega-3 Fatty Acids (FISH OIL PO) Take 1 tablet by mouth Daily.      pantoprazole (PROTONIX) 40 MG EC tablet TAKE 1 TABLET BY MOUTH DAILY 90 tablet 3    rosuvastatin (CRESTOR) 5 MG tablet TAKE 1 TABLET BY MOUTH EVERY NIGHT 90 tablet 3    [DISCONTINUED] lisinopril (PRINIVIL,ZESTRIL) 2.5 MG tablet TAKE 1 TABLET BY MOUTH DAILY 90 tablet 3    [DISCONTINUED] fluticasone (FLONASE) 50 MCG/ACT nasal spray 1-2 sprays each nostril daily 16 g 0     No facility-administered encounter medications on file as of 11/21/2024.       Reviewed use of high risk medication in the elderly: yes  No opioid medication identified in the patient's active medication list  Reviewed for potential of harmful drug interactions in the elderly: yes    Follow Up:  Return in about 6 months (around 5/21/2025) for Recheck.     Patient Instructions   Lab of November 11, 2024 discussed  EKG discussed with no change  Continue current therapy   suggest increase lisinopril to 5 mg from 2.5  Encouraged Tylenol PM and melatonin 5 for at bedtime  Encouraged to resume exercise routine  Encouraged healthy cardiac diabetic diet with reduced carbs  Continue follow-up with cardiology  Return visit in 6 months or as needed    An After Visit Summary and PPPS with all of these plans were given to the patient.                11/21/24   09:56 EST     Patient or patient representative verbalized consent for the use of Ambient Listening during the visit with  Merrill De La Paz MD for chart documentation. 11/21/2024  17:22 EST

## 2024-11-21 NOTE — ASSESSMENT & PLAN NOTE
Grand mal seizure with last seizure 2015 on Keppra 500 mg-half tablet or 250 mg daily continue therapy

## 2024-12-05 DIAGNOSIS — E11.9 TYPE 2 DIABETES MELLITUS WITHOUT COMPLICATION, WITHOUT LONG-TERM CURRENT USE OF INSULIN: ICD-10-CM

## 2024-12-05 NOTE — TELEPHONE ENCOUNTER
Caller: Christian Mcintosh    Relationship: Self    Best call back number: [phone]    Requested Prescriptions:   Requested Prescriptions     Pending Prescriptions Disp Refills    metFORMIN (GLUCOPHAGE) 500 MG tablet 180 tablet 1     Sig: Take 1 tablet by mouth 2 (Two) Times a Day With Meals.        Pharmacy where request should be sent: [id number] [place] [id number] [address] AT [facility] [address] - [phone]  - [phone]     Last office visit with prescribing clinician: 11/21/2024   Last telemedicine visit with prescribing clinician: Visit date not found   Next office visit with prescribing clinician: 5/21/2025     Additional details provided by patient: THE PATIENT WILL BE OUT ON MONDAY    Does the patient have less than a 3 day supply:  [] Yes  [x] No    Would you like a call back once the refill request has been completed: [] Yes [x] No    If the office needs to give you a call back, can they leave a voicemail: [] Yes [x] No    Eric Tony Rep   12/05/24 16:43 EST

## 2025-01-02 ENCOUNTER — TELEPHONE (OUTPATIENT)
Dept: INTERNAL MEDICINE | Facility: CLINIC | Age: 80
End: 2025-01-02
Payer: MEDICARE

## 2025-01-02 NOTE — TELEPHONE ENCOUNTER
I called patient and discussed his fasting and random blood sugars with minor elevation, the fact that he is slightly lightheaded with a bit of an unsteady gait he has had no falls no coughing wheezing shortness of breath or chest pain or pressure  The patient states he has started an exercise program and he is being careful about cardiac diabetic diet with reduced carbs and some weight loss I suggested no medication changes based upon last lab work in November 2024 patient states he will monitor his blood pressure and fasting blood sugar and let us know if there is any change.

## 2025-01-02 NOTE — TELEPHONE ENCOUNTER
Caller: Christian Mcintosh    Relationship: Self    Best call back number: [phone]     What was the call regarding: BLOOD SUGAR. PATIENT STATES THAT IT IS HIGH, 125-150. REQUESTING A CALL BACK TO DISCUSS. PATIENT DOES NOT FEEL GOOD.     Is it okay if the provider responds through MyChart: NO

## 2025-01-21 RX ORDER — LEVETIRACETAM 500 MG/1
250 TABLET ORAL DAILY
Qty: 45 TABLET | Refills: 3 | Status: SHIPPED | OUTPATIENT
Start: 2025-01-21

## 2025-05-12 ENCOUNTER — TELEPHONE (OUTPATIENT)
Dept: INTERNAL MEDICINE | Facility: CLINIC | Age: 80
End: 2025-05-12

## 2025-05-12 DIAGNOSIS — E78.5 HYPERLIPIDEMIA LDL GOAL <70: ICD-10-CM

## 2025-05-12 DIAGNOSIS — R53.83 OTHER FATIGUE: ICD-10-CM

## 2025-05-12 DIAGNOSIS — I10 ESSENTIAL HYPERTENSION: ICD-10-CM

## 2025-05-12 DIAGNOSIS — E11.9 TYPE 2 DIABETES MELLITUS WITHOUT COMPLICATION, WITHOUT LONG-TERM CURRENT USE OF INSULIN: Primary | ICD-10-CM

## 2025-05-12 NOTE — TELEPHONE ENCOUNTER
Caller: Christian Mcintosh    Relationship: Self    Best call back number: [phone]     What is the best time to reach you: ANYTIME     Who are you requesting to speak with (clinical staff, provider,  specific staff member): CLINICAL STAFF    What was the call regarding: PATIENT IS CALLING TO SEE IF HE CAN HAVE LAB ORDERS PLACED FOR HIS UPCOMING APPOINTMENT ON 05/21    Is it okay if the provider responds through [facility]: YES

## 2025-05-19 ENCOUNTER — LAB (OUTPATIENT)
Dept: LAB | Facility: HOSPITAL | Age: 80
End: 2025-05-19
Payer: MEDICARE

## 2025-05-19 DIAGNOSIS — E78.5 HYPERLIPIDEMIA LDL GOAL <70: ICD-10-CM

## 2025-05-19 DIAGNOSIS — I10 ESSENTIAL HYPERTENSION: ICD-10-CM

## 2025-05-19 DIAGNOSIS — E11.9 TYPE 2 DIABETES MELLITUS WITHOUT COMPLICATION, WITHOUT LONG-TERM CURRENT USE OF INSULIN: ICD-10-CM

## 2025-05-19 DIAGNOSIS — R53.83 OTHER FATIGUE: ICD-10-CM

## 2025-05-19 LAB
ALBUMIN SERPL-MCNC: 4.4 G/DL (ref 3.5–5.2)
ALBUMIN UR-MCNC: 2.1 MG/DL
ALBUMIN/GLOB SERPL: 1.8 G/DL
ALP SERPL-CCNC: 65 U/L (ref 39–117)
ALT SERPL W P-5'-P-CCNC: 17 U/L (ref 1–41)
ANION GAP SERPL CALCULATED.3IONS-SCNC: 12.1 MMOL/L (ref 5–15)
AST SERPL-CCNC: 27 U/L (ref 1–40)
BASOPHILS # BLD AUTO: 0.11 10*3/MM3 (ref 0–0.2)
BASOPHILS NFR BLD AUTO: 1.6 % (ref 0–1.5)
BILIRUB SERPL-MCNC: 0.3 MG/DL (ref 0–1.2)
BUN SERPL-MCNC: 15 MG/DL (ref 8–23)
BUN/CREAT SERPL: 14.7 (ref 7–25)
CALCIUM SPEC-SCNC: 9.1 MG/DL (ref 8.6–10.5)
CHLORIDE SERPL-SCNC: 107 MMOL/L (ref 98–107)
CHOLEST SERPL-MCNC: 141 MG/DL (ref 0–200)
CO2 SERPL-SCNC: 23.9 MMOL/L (ref 22–29)
CREAT SERPL-MCNC: 1.02 MG/DL (ref 0.76–1.27)
CREAT UR-MCNC: 279.8 MG/DL
DEPRECATED RDW RBC AUTO: 41.5 FL (ref 37–54)
EGFRCR SERPLBLD CKD-EPI 2021: 75.7 ML/MIN/1.73
EOSINOPHIL # BLD AUTO: 0.6 10*3/MM3 (ref 0–0.4)
EOSINOPHIL NFR BLD AUTO: 8.8 % (ref 0.3–6.2)
ERYTHROCYTE [DISTWIDTH] IN BLOOD BY AUTOMATED COUNT: 12.5 % (ref 12.3–15.4)
GLOBULIN UR ELPH-MCNC: 2.5 GM/DL
GLUCOSE SERPL-MCNC: 121 MG/DL (ref 65–99)
HBA1C MFR BLD: 6.4 % (ref 4.8–5.6)
HCT VFR BLD AUTO: 35.6 % (ref 37.5–51)
HDLC SERPL-MCNC: 38 MG/DL (ref 40–60)
HGB BLD-MCNC: 11.8 G/DL (ref 13–17.7)
IMM GRANULOCYTES # BLD AUTO: 0.02 10*3/MM3 (ref 0–0.05)
IMM GRANULOCYTES NFR BLD AUTO: 0.3 % (ref 0–0.5)
LDLC SERPL CALC-MCNC: 75 MG/DL (ref 0–100)
LDLC/HDLC SERPL: 1.84 {RATIO}
LYMPHOCYTES # BLD AUTO: 2.32 10*3/MM3 (ref 0.7–3.1)
LYMPHOCYTES NFR BLD AUTO: 34.1 % (ref 19.6–45.3)
MCH RBC QN AUTO: 30.4 PG (ref 26.6–33)
MCHC RBC AUTO-ENTMCNC: 33.1 G/DL (ref 31.5–35.7)
MCV RBC AUTO: 91.8 FL (ref 79–97)
MICROALBUMIN/CREAT UR: 7.5 MG/G (ref 0–29)
MONOCYTES # BLD AUTO: 0.53 10*3/MM3 (ref 0.1–0.9)
MONOCYTES NFR BLD AUTO: 7.8 % (ref 5–12)
NEUTROPHILS NFR BLD AUTO: 3.22 10*3/MM3 (ref 1.7–7)
NEUTROPHILS NFR BLD AUTO: 47.4 % (ref 42.7–76)
NRBC BLD AUTO-RTO: 0 /100 WBC (ref 0–0.2)
PLATELET # BLD AUTO: 197 10*3/MM3 (ref 140–450)
PMV BLD AUTO: 11.5 FL (ref 6–12)
POTASSIUM SERPL-SCNC: 4.2 MMOL/L (ref 3.5–5.2)
PROT SERPL-MCNC: 6.9 G/DL (ref 6–8.5)
RBC # BLD AUTO: 3.88 10*6/MM3 (ref 4.14–5.8)
SODIUM SERPL-SCNC: 143 MMOL/L (ref 136–145)
TRIGL SERPL-MCNC: 165 MG/DL (ref 0–150)
TSH SERPL DL<=0.05 MIU/L-ACNC: 1.94 UIU/ML (ref 0.27–4.2)
VLDLC SERPL-MCNC: 28 MG/DL (ref 5–40)
WBC NRBC COR # BLD AUTO: 6.8 10*3/MM3 (ref 3.4–10.8)

## 2025-05-19 PROCEDURE — 85025 COMPLETE CBC W/AUTO DIFF WBC: CPT

## 2025-05-19 PROCEDURE — 84443 ASSAY THYROID STIM HORMONE: CPT

## 2025-05-19 PROCEDURE — 80053 COMPREHEN METABOLIC PANEL: CPT

## 2025-05-19 PROCEDURE — 82570 ASSAY OF URINE CREATININE: CPT

## 2025-05-19 PROCEDURE — 83036 HEMOGLOBIN GLYCOSYLATED A1C: CPT

## 2025-05-19 PROCEDURE — 82043 UR ALBUMIN QUANTITATIVE: CPT

## 2025-05-19 PROCEDURE — 80061 LIPID PANEL: CPT

## 2025-05-21 ENCOUNTER — OFFICE VISIT (OUTPATIENT)
Dept: INTERNAL MEDICINE | Facility: CLINIC | Age: 80
End: 2025-05-21
Payer: MEDICARE

## 2025-05-21 VITALS
HEART RATE: 52 BPM | BODY MASS INDEX: 28.12 KG/M2 | OXYGEN SATURATION: 96 % | DIASTOLIC BLOOD PRESSURE: 82 MMHG | SYSTOLIC BLOOD PRESSURE: 136 MMHG | HEIGHT: 66 IN | WEIGHT: 175 LBS

## 2025-05-21 DIAGNOSIS — Z95.3 STATUS POST AORTIC VALVE REPLACEMENT WITH BIOPROSTHETIC VALVE: Chronic | ICD-10-CM

## 2025-05-21 DIAGNOSIS — E78.5 HYPERLIPIDEMIA LDL GOAL <70: ICD-10-CM

## 2025-05-21 DIAGNOSIS — I10 ESSENTIAL HYPERTENSION: Chronic | ICD-10-CM

## 2025-05-21 DIAGNOSIS — D64.89 ANEMIA DUE TO OTHER CAUSE, NOT CLASSIFIED: Primary | ICD-10-CM

## 2025-05-21 DIAGNOSIS — E11.9 TYPE 2 DIABETES MELLITUS WITHOUT COMPLICATION, WITHOUT LONG-TERM CURRENT USE OF INSULIN: ICD-10-CM

## 2025-05-21 DIAGNOSIS — G40.409 GRAND MAL SEIZURE: ICD-10-CM

## 2025-05-21 NOTE — ASSESSMENT & PLAN NOTE
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

## 2025-05-21 NOTE — ASSESSMENT & PLAN NOTE
Mixed hyper lipidemia on Crestor 5 mg daily omega-3 fatty fish oil, denies arthralgia or myalgia recent lab of May 19, 2025 normal liver function, and normal cholesterol 141 and LDL of 75 HDL 38 and triglycerides of 165 continue therapy encouraged healthy cardiac low-cholesterol diet

## 2025-05-21 NOTE — ASSESSMENT & PLAN NOTE
Remote seizure disorder with last seizure approximately 2015 on therapy of Keppra 250 mg daily continue therapy

## 2025-05-21 NOTE — PATIENT INSTRUCTIONS
Lab of 5/19/2025 discussed with anemia of 11.8 g down from 14 in the last 6 months  Hemoccult negative stool, but I did not find significant stool in the rectum  Arrange EGD and colonoscopy, with last colonoscopy 2022  Continue all current medications  Continue activity exercise  Return visit in 4 weeks or as needed

## 2025-05-21 NOTE — ASSESSMENT & PLAN NOTE
Remote aortic valve replacement March 2013 currently stable on Crestor 5 lisinopril 5, aspirin 81 continue therapy  CMP of May 19, 2025 normal kidney function normal liver function normal electrolytes and blood sugar 121

## 2025-05-21 NOTE — ASSESSMENT & PLAN NOTE
Rectal exam no stool found Hemoccult was negative  Because of recent anemia 0.8 g down from 6 months ago at 14.8 and the complaint of melena stools  Will obtain EGD and colonoscopy from GI

## 2025-05-21 NOTE — PROGRESS NOTES
[facility]     Christian Mcintosh  1945   [id number]      Patient Care Team:  Merrill De La Paz MD as PCP - General (Internal Medicine)  Marshal Spangler IV, MD as Cardiologist (Interventional Cardiology)  Silvio Ahn MD as Consulting Physician (Urology)    Chief Complaint::   Chief Complaint   Patient presents with    Hypertension     6 mo follow up    Hyperlipidemia    Diabetes            HPI  History of Present Illness  The patient is a 79-year-old male who presents to the office for hypertension, hyperlipidemia, diabetes, seizure disorder, and aortic valve replacement.    He has been monitoring his blood pressure intermittently, with readings in the 130s. He reports no symptoms of headache, dizziness, or lightheadedness. He does not experience any cardiac symptoms such as palpitations, tachycardia, or arrhythmias.    He reports no gastrointestinal symptoms such as nausea, vomiting, or dyspepsia. He has not experienced any recent episodes of epistaxis or hematochezia. He has a history of hemorrhoids, which occasionally flare up. Approximately a month ago, he noticed melena, which resolved spontaneously after 3 to 4 days. He also reported pruritus in the anal region during this period. He has observed occasional hematochezia, which he attributes to his hemorrhoids. . He underwent a colonoscopy on 02/15/2022, with no polyp or masses only a few diverticuli    He has initiated a moderate exercise regimen at the gym, attending approximately three times per week. His routine includes 15 minutes of cycling, 5 minutes on the elliptical machine, and 10 minutes of light weightlifting. He reports feeling slightly fatigued post-exercise but overall feels well. He has been attempting to increase his walking distance but experiences knee pain after walking more than a mile. He continues to use knee braces during exercise due to a history of meniscus surgery.    He has a history of cataract  surgery and reports no known ocular diseases such as macular degeneration. He experiences intermittent ocular discomfort in his left eye, described as a scratchy sensation. He has scheduled an ophthalmology appointment in 07/2025.    He reports no nasal symptoms such as rhinorrhea or postnasal drip. His auditory function is compromised, particularly in noisy environments. He has undergone audiological testing and is considering the use of hearing aids.    He reports no respiratory symptoms such as cough or wheezing. He recalls an episode of upper respiratory tract infection in 04/2025, which was managed with a nasal spray.    He reports no recent changes in his energy levels or increased fatigue. He typically feels drowsy in the afternoon, particularly after consuming a large meal, and often takes a nap.    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    Testicular hypofunction    First degree AV block    BPH with urinary obstruction    Chronic GERD    Primary osteoarthritis of both knees    Health counseling    Osteoarthritis    Secondary osteoarthritis of right shoulder due to rotator cuff tear    Medicare annual wellness visit, subsequent    Ataxia    Medicare annual wellness visit, subsequent    Bilateral impacted cerumen    Medicare annual wellness visit, subsequent    Closed nondisplaced fracture of greater trochanter of right femur with routine healing    Fever    Medicare annual wellness visit,  subsequent    Other fatigue    Other specified anemias        Past Medical History:   Diagnosis Date    Allergic 1966    seafood    Aortic stenosis     Aortic valve replaced 3/15/2013    Atrial flutter     RFA by Dr. Padilla    Benign prostatic hypertrophy w elevated PSA 01/03/2018    BX NO CANCER    Bicuspid aortic valve     Cataract     Coronary artery disease 03/06/2013    COVID-19 01/17/2022    Diverticulosis 2002    Erectile dysfunction 1/1/20    Age related    Gastric ulcer 2002    GERD (gastroesophageal reflux disease)     NL-Cqtfeka-Ixiqj tear 07/23/2013    H/O atrial flutter     Heart murmur 1/1/1966    [employer]    History of basal cell cancer 2007    History of echocardiogram     History of rotator cuff tear     HL (hearing loss) 1/1/23    Age related    Hyperlipidemia     Hypertension     Osteoarthritis 2011    PONV (postoperative nausea and vomiting)     Seizure     Skin cancer     skin.  BASAL CELL SKIN CANCER LEFT CHEEK    Status post shoulder surgery     Tear of left rotator cuff     Tinnitus     Typical atrial flutter 06/07/2016    · Diagnosed postoperatively, asymptomatic. · Typical flutter pattern on ECG, 05/03/2013. Initiated on Coumadin. · Left atrial appendage excluded with AVR using Tiger Paw occluder. · Radiofrequency ablation by Dr. Brooks Padilla, July 2013.  · Coumadin initiated following ablation but stopped after development of GI bleeding and a Tarsha-Villeda tear.     Urinary retention     king    Visual impairment 1:1:15    Age related       Past Surgical History:   Procedure Laterality Date    AORTIC VALVE REPAIR/REPLACEMENT  3/15/2013    BASAL CELL CARCINOMA EXCISION  2007    BONE SPUR ARM      CARDIAC ABLATION      CARDIAC CATHETERIZATION  03/06/2013    CARDIAC VALVE REPLACEMENT  03/26/2013    bioprosthetic aortic valve    CARDIAC VALVE REPLACEMENT      CARPAL TUNNEL RELEASE      CATARACT EXTRACTION      COLONOSCOPY      COLONOSCOPY  08/29/2012    CYST REMOVAL       CYSTOSCOPY TRANSURETHRAL RESECTION OF PROSTATE N/A 2018    Procedure: PROSTATE ULTRASOUND AND BIOPSY, CYSTOSCOPY TRANSURETHRAL RESECTION OF PROSTATE GREENLIGHT WITH VAPORIZATION;  Surgeon: Silvio Ahn MD;  Location: [facility] OR;  Service:     ENDOSCOPY      w/biopsy    EYE SURGERY      bilateral cataract extraction    MCQUEEN CATHETER  2016    HERNIA REPAIR  2010    KNEE ARTHROSCOPY      KNEE MENISCAL REPAIR Bilateral     KNEE SURGERY Left 2011    LASIK      PROSTATE BIOPSY  2018    PROSTATE SURGERY  2016    laser    ROTATOR CUFF REPAIR  10/26/2016    SHOULDER SURGERY Left     SKIN CANCER EXCISION  2016    left cheek    SKIN CANCER EXCISION      TURP / TRANSURETHRAL INCISION / DRAINAGE PROSTATE      VASECTOMY         Family History   Problem Relation Age of Onset    No Known Problems Mother     Heart attack Father 60        Father    Coronary artery disease Father     Early death Father         heart  age 60    Heart disease Brother         age 73    Diabetes Brother     Early death Brother         Age 75.    Diabetes Sister     Other Son         Car accident       Social History     Socioeconomic History    Marital status:    Tobacco Use    Smoking status: Former     Current packs/day: 0.00     Average packs/day: 2.0 packs/day for 23.1 years (46.1 ttl pk-yrs)     Types: Cigarettes     Start date: 1963     Quit date: 1986     Years since quittin.4    Smokeless tobacco: Never   Vaping Use    Vaping status: Never Used   Substance and Sexual Activity    Alcohol use: Yes     Alcohol/week: 1.0 standard drink of alcohol     Types: 1 Shots of liquor per week     Comment: occasional     Drug use: No    Sexual activity: Not Currently       Allergies   Allergen Reactions    Shellfish-Derived Products Anaphylaxis       Review of Systems     HEENT: Denies headache or dizzy he has remote bilateral cataracts and has poor hearing and is thinking about hearing test and hearing  "aids  NECK: Denies pain stiffness swelling dysphagia  CHEST: Denies cough wheeze or shortness of breath  CARDIAC: Denies chest pain or pressure remote aortic valve replacement and history of hypertension currently stable  ABD: Denies nausea vomiting and melena stool approximately 3 to 4 weeks ago  : Denies dysuria frequency  NEURO: Remote seizure last 2015 on Keppra  PSYCH: Denies anxiety depression  EXTREM: Mild arthritic soreness primarily knees wearing elastic brace  SKIN: Denies rash    Vital Signs  Vitals:    05/21/25 0937 05/21/25 1626   BP: 148/86 136/82   BP Location: Right arm    Patient Position: Sitting    Cuff Size: Adult    Pulse: 52  Comment: sl irregular    SpO2: 96%    Weight: 79.4 kg (175 lb)    Height: 167.6 cm (66\")    PainSc: 0-No pain      Body mass index is 28.25 kg/m².  BMI is >= 25 and <30. (Overweight) The following options were offered after discussion;: nutrition counseling/recommendations     Advance Care Planning   ACP discussion was held with the patient during this visit. Patient has an advance directive (not in EMR), copy requested.       Current Outpatient Medications:     albuterol sulfate  (90 Base) MCG/ACT inhaler, Inhale 2 puffs Every 4 (Four) Hours As Needed for Wheezing., Disp: 18 g, Rfl: 0    aspirin 81 MG EC tablet, Take 1 tablet by mouth Daily., Disp: 90 tablet, Rfl: 3    B Complex Vitamins (VITAMIN B COMPLEX) capsule capsule, Take 1 capsule by mouth Daily., Disp: , Rfl:     Cholecalciferol (VITAMIN D3) 5000 units capsule capsule, Take 1 capsule by mouth Daily., Disp: , Rfl:     Coenzyme Q10 (CO Q 10 PO), Take 1 capsule by mouth Daily., Disp: , Rfl:     DHEA 25 MG capsule, Take 25 mg by mouth Daily., Disp: , Rfl:     levETIRAcetam (KEPPRA) 500 MG tablet, TAKE 1/2 TABLET BY MOUTH DAILY, Disp: 45 tablet, Rfl: 3    lisinopril (PRINIVIL,ZESTRIL) 5 MG tablet, Take 1 tablet by mouth Daily., Disp: 90 tablet, Rfl: 3    metFORMIN (GLUCOPHAGE) 500 MG tablet, Take 1 tablet by " mouth 2 (Two) Times a Day With Meals., Disp: 180 tablet, Rfl: 1    Multiple Vitamins-Minerals (CENTRUM ADULTS PO), Take 1 tablet by mouth Daily., Disp: , Rfl:     Omega-3 Fatty Acids (FISH OIL PO), Take 1 tablet by mouth Daily., Disp: , Rfl:     pantoprazole (PROTONIX) 40 MG EC tablet, TAKE 1 TABLET BY MOUTH DAILY, Disp: 90 tablet, Rfl: 3    rosuvastatin (CRESTOR) 5 MG tablet, TAKE 1 TABLET BY MOUTH EVERY NIGHT, Disp: 90 tablet, Rfl: 3    Physical Exam     ACE III MINI        Physical Exam  Physical Exam  Neck: Supple, no abnormalities  Respiratory: Clear to auscultation, no wheezing, rales or rhonchi  Cardiovascular: Regular rate and rhythm, no murmurs, rubs, or gallops  Gastrointestinal: Liver and spleen normal  Rectal: Normal sphincter tone, very tiny non-enlarged hemorrhoid at the upper portion of the rectum  HEENT: Eyes ears nose and throat are clear no facial asymmetry pharynx is clear sinuses nontender  NECK: No mass bruit or thyromegaly  CHEST: Clear  CARDIAC: Regular rhythm without gallop or rub low-grade murmur of aortic valve blood pressure heart rate stable  ABD: Liver spleen normal positive bowel sounds no bruit rectal exam revealed no stool in the rectal vault with Hemoccult negative and small hemorrhoid  : Deferred  NEURO: Intact remote seizure last 2015  PSYCH: Normal  EXTREM: Mild arthritic soreness of both knees  Skin: Clear     Results Review:    Recent Results (from the past 4 weeks)   Lipid Panel    Collection Time: 05/19/25  8:39 AM    Specimen: Blood   Result Value Ref Range    Total Cholesterol 141 0 - 200 mg/dL    Triglycerides 165 (H) 0 - 150 mg/dL    HDL Cholesterol 38 (L) 40 - 60 mg/dL    LDL Cholesterol  75 0 - 100 mg/dL    VLDL Cholesterol 28 5 - 40 mg/dL    LDL/HDL Ratio 1.84    Comprehensive Metabolic Panel    Collection Time: 05/19/25  8:39 AM    Specimen: Blood   Result Value Ref Range    Glucose 121 (H) 65 - 99 mg/dL    BUN 15 8 - 23 mg/dL    Creatinine 1.02 0.76 - 1.27 mg/dL     Sodium 143 136 - 145 mmol/L    Potassium 4.2 3.5 - 5.2 mmol/L    Chloride 107 98 - 107 mmol/L    CO2 23.9 22.0 - 29.0 mmol/L    Calcium 9.1 8.6 - 10.5 mg/dL    Total Protein 6.9 6.0 - 8.5 g/dL    Albumin 4.4 3.5 - 5.2 g/dL    ALT (SGPT) 17 1 - 41 U/L    AST (SGOT) 27 1 - 40 U/L    Alkaline Phosphatase 65 39 - 117 U/L    Total Bilirubin 0.3 0.0 - 1.2 mg/dL    Globulin 2.5 gm/dL    A/G Ratio 1.8 g/dL    BUN/Creatinine Ratio 14.7 7.0 - 25.0    Anion Gap 12.1 5.0 - 15.0 mmol/L    eGFR 75.7 >60.0 mL/min/1.73   Microalbumin / Creatinine Urine Ratio - Urine, Clean Catch    Collection Time: 05/19/25  8:39 AM    Specimen: Urine, Clean Catch   Result Value Ref Range    Microalbumin/Creatinine Ratio 7.5 0.0 - 29.0 mg/g    Creatinine, Urine 279.8 mg/dL    Microalbumin, Urine 2.1 mg/dL   TSH    Collection Time: 05/19/25  8:39 AM    Specimen: Blood   Result Value Ref Range    TSH 1.940 0.270 - 4.200 uIU/mL   Hemoglobin A1c    Collection Time: 05/19/25  8:39 AM    Specimen: Blood   Result Value Ref Range    Hemoglobin A1C 6.40 (H) 4.80 - 5.60 %   CBC Auto Differential    Collection Time: 05/19/25  8:39 AM    Specimen: Blood   Result Value Ref Range    WBC 6.80 3.40 - 10.80 10*3/mm3    RBC 3.88 (L) 4.14 - 5.80 10*6/mm3    Hemoglobin 11.8 (L) 13.0 - 17.7 g/dL    Hematocrit 35.6 (L) 37.5 - 51.0 %    MCV 91.8 79.0 - 97.0 fL    MCH 30.4 26.6 - 33.0 pg    MCHC 33.1 31.5 - 35.7 g/dL    RDW 12.5 12.3 - 15.4 %    RDW-SD 41.5 37.0 - 54.0 fl    MPV 11.5 6.0 - 12.0 fL    Platelets 197 140 - 450 10*3/mm3    Neutrophil % 47.4 42.7 - 76.0 %    Lymphocyte % 34.1 19.6 - 45.3 %    Monocyte % 7.8 5.0 - 12.0 %    Eosinophil % 8.8 (H) 0.3 - 6.2 %    Basophil % 1.6 (H) 0.0 - 1.5 %    Immature Grans % 0.3 0.0 - 0.5 %    Neutrophils, Absolute 3.22 1.70 - 7.00 10*3/mm3    Lymphocytes, Absolute 2.32 0.70 - 3.10 10*3/mm3    Monocytes, Absolute 0.53 0.10 - 0.90 10*3/mm3    Eosinophils, Absolute 0.60 (H) 0.00 - 0.40 10*3/mm3    Basophils, Absolute 0.11 0.00 -  0.20 10*3/mm3    Immature Grans, Absolute 0.02 0.00 - 0.05 10*3/mm3    nRBC 0.0 0.0 - 0.2 /100 WBC       Procedures recent lab of May 19 noted and discussed    Medication Review: Medications reviewed and noted    Patient wellness counseling  Exercise: Continue ADL  Diet: Cardiac diet  Screening: Arrange GI consult for potential EGD colonoscopy for the anemia  Social History     Socioeconomic History    Marital status:    Tobacco Use    Smoking status: Former     Current packs/day: 0.00     Average packs/day: 2.0 packs/day for 23.1 years (46.1 ttl pk-yrs)     Types: Cigarettes     Start date: 1963     Quit date: 1986     Years since quittin.4    Smokeless tobacco: Never   Vaping Use    Vaping status: Never Used   Substance and Sexual Activity    Alcohol use: Yes     Alcohol/week: 1.0 standard drink of alcohol     Types: 1 Shots of liquor per week     Comment: occasional     Drug use: No    Sexual activity: Not Currently        Assessment/Plan:    Assessment & Plan  1. Hypertension.  - Blood pressure was mildly elevated at 134/78 mmHg.  - Reports no headaches, dizziness, or lightheadedness.  - Advised to continue monitoring blood pressure regularly and maintain current exercise routine.  - Continue current antihypertensive medication.    2. Hyperlipidemia.  - Cholesterol levels are well-controlled with a total cholesterol of 141 mg/dL and LDL of 75 mg/dL.  - Continue current lipid-lowering therapy.  - Maintain a healthy diet.  - Regular follow-up to monitor lipid levels.    3. Diabetes Mellitus.  - Average blood glucose level over the past three months was 132 mg/dL, with a recent blood sugar reading of 121 mg/dL.  - Continue current diabetes management plan, including medication adherence, regular exercise, and a balanced diet.  - Recent lab work showed normal thyroid test, urine protein, kidney function, liver function, and body salts.    4. Seizure Disorder.  - Reports no recent seizures or  neurological symptoms.  - Continue current antiepileptic medication regimen.  - Follow up with neurologist as scheduled.  - Regular monitoring for seizure activity.    5. Aortic Valve Replacement.  - Doing well post-aortic valve replacement and has resumed gym activities.  - No new cardiac symptoms reported.  - Continue current cardiac medications.  - Follow up with cardiologist as scheduled.    6. Anemia.  -  anemia with a hemoglobin level of 11.8 g/dL, down from 14 g/dL six months ago.  - Rectal exam revealed a tiny non-enlarged hemorrhoid but no significant bowel movement or blood.  - Endoscopy and colonoscopy will be scheduled to investigate the cause of anemia.  - Monitor for any signs of gastrointestinal bleeding.    7. Hearing Loss.  - Reports difficulty hearing in noisy environments.  - Plans to try cheaper hearing aids.  - If hearing does not improve, consider more advanced hearing aids.  - Regular follow-up to assess hearing improvement.    8. Cataract Surgery Follow-up.  - Follow-up appointment for cataract surgery in 07/2025 due to occasional scratchiness in the left eye.  - Monitor for any changes in vision.  - Regular ophthalmology follow-up.    Follow-up  - Follow up in 4 weeks.    Problem List Items Addressed This Visit          Cardiac and Vasculature    Status post aortic valve replacement with bioprosthetic valve (Chronic)    Overview   Echocardiogram (08/08/2012): Moderate AS, KLAUDIA of 0.8 cm2.  Echocardiogram (02/26/2013): LVEF 55%, severe AS with mean gradient of 65 mmHg.   Bioprosthetic aortic valve replacement by Dr. Stuart Griffin (03/26/2013): 25-mm Magna Ease with ascending aortoplasty. Left atrial appendage excluded  Echo (6/15/18): LVEF 48%. Normal functioning bioprosthetic aortic valve. Trivial paravalvular leak.  Echo (4/23/2021): Echo unchanged from 6/15/2018  Echo (6/10/2024): LVEF 60%. A #25 Magna Ease bioprosthetic aortic valve is in place and appears to function normally. Mean  gradient across about 13 mmHg.          Current Assessment & Plan   Remote aortic valve replacement March 2013 currently stable on Crestor 5 lisinopril 5, aspirin 81 continue therapy  CMP of May 19, 2025 normal kidney function normal liver function normal electrolytes and blood sugar 121         Relevant Medications    aspirin 81 MG EC tablet    Essential hypertension (Chronic)    Overview   Target blood pressure <130/80 mmHg         Current Assessment & Plan   Essential hypertension with blood pressure 146/82 heart rate of 52, O2 saturation of 96% saturation  On lisinopril 5 mg daily denies headache or cough  CMP lab of May 19 normal electrolytes kidney function and liver function         Relevant Medications    lisinopril (PRINIVIL,ZESTRIL) 5 MG tablet    Hyperlipidemia LDL goal <70    Overview   Statin therapy indicated given presence of coronary artery disease         Current Assessment & Plan    Mixed hyper lipidemia on Crestor 5 mg daily omega-3 fatty fish oil, denies arthralgia or myalgia recent lab of May 19, 2025 normal liver function, and normal cholesterol 141 and LDL of 75 HDL 38 and triglycerides of 165 continue therapy encouraged healthy cardiac low-cholesterol diet         Relevant Medications    rosuvastatin (CRESTOR) 5 MG tablet       Endocrine and Metabolic    Type 2 diabetes mellitus without complication, without long-term current use of insulin    Overview   Diabetes type 2 on metformin 500 mg twice daily with lab work on 11/11/2024 A1c 6.80 and fasting blood sugar 119 with GFR 75 and creatinine of 1.02 continue therapy and encouraged healthy cardiac healthy diabetic reduce carb diet         Current Assessment & Plan   Type is type II on metformin 500 mg twice daily with lab of May 19, 2025, hemoglobin A1c of 6.40 and fasting CMP with a blood sugar of 121 continue therapy and careful cardiac and diabetic diet         Relevant Medications    lisinopril (PRINIVIL,ZESTRIL) 5 MG tablet    metFORMIN  (GLUCOPHAGE) 500 MG tablet       Hematology and Neoplasia    Other specified anemias - Primary    Overview   Lab work of May 19, 2025 hemoglobin of 11.8 hematocrit of 35.6 with normal white count and platelet count compared to last CBC of November 11, 2024 with a hemoglobin of 14.8 hematocrit of 43.8  Patient states that he thought he had some rectal bleeding recently with black tarry bowel stool for several days denies abdominal pain denies red blood,  Rectal exam negative Hemoccult however no stool was found in the rectal vault  Patient's last colonoscopy was February 15, 2022 by Dr. 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         Current Assessment & Plan   Remote seizure disorder with last seizure approximately 2015 on therapy of Keppra 250 mg daily continue therapy             Patient Instructions   Lab of 5/19/2025 discussed with anemia of 11.8 g down from 14 in the last 6 months  Hemoccult negative stool, but I did not find significant stool in the rectum  Arrange EGD and colonoscopy, with last colonoscopy 2022  Continue all current medications  Continue activity exercise  Return visit in 4 weeks or as needed     Plan of care reviewed with patient at the conclusion of today's visit. Education was provided regarding diagnosis, management, and any prescribed or recommended OTC medications.Patient verbalizes understanding of and agreement with management plan.           05/21/25   09:48  EDT    Patient or patient representative verbalized consent for the use of Ambient Listening during the visit with  Merrill De La Paz MD for chart documentation. 5/21/2025  16:46 EDT      Answers submitted by the patient for this visit:  Problem not listed (Submitted on 5/14/2025)  Chief Complaint: Other medical problem  Reason for appointment: Semi annual check up  anorexia: No  joint pain: No  change in stool: No  headaches: No  joint swelling: No  vertigo: No  visual change: Yes  Onset: 6 to 12 months  Chronicity: recurrent  Frequency: intermittently

## 2025-05-21 NOTE — ASSESSMENT & PLAN NOTE
Essential hypertension with blood pressure 146/82 heart rate of 52, O2 saturation of 96% saturation  On lisinopril 5 mg daily denies headache or cough  CMP lab of May 19 normal electrolytes kidney function and liver function

## 2025-05-22 ENCOUNTER — TELEPHONE (OUTPATIENT)
Dept: INTERNAL MEDICINE | Facility: CLINIC | Age: 80
End: 2025-05-22

## 2025-05-22 DIAGNOSIS — D64.89 ANEMIA DUE TO OTHER CAUSE, NOT CLASSIFIED: Primary | ICD-10-CM

## 2025-05-22 NOTE — TELEPHONE ENCOUNTER
"  Caller: Christian Mcintosh \"Dagoberto\"    Relationship: Self    Best call back number: [phone]       What specialty or service is being requested: GASTROENTEROLOGY    What is the provider, practice or medical service name: DR CERVANTES    What is the office location: [address] [place] [address]    What is the office phone number: [phone]    Any additional details: PATIENT SPOUSE GOES TO THIS PROVIDER AND HE WANTS TO KNOW IF HE CAN DO THE PROCEDURE THAT HE SWALLOWS A PILL THAT HAS A CAMERA IN IT      "

## 2025-05-28 ENCOUNTER — TELEPHONE (OUTPATIENT)
Dept: INTERNAL MEDICINE | Facility: CLINIC | Age: 80
End: 2025-05-28
Payer: MEDICARE

## 2025-05-28 NOTE — TELEPHONE ENCOUNTER
"Caller: Christian Mcintosh \"Dagoberto\"    Relationship: Self    Best call back number: [phone]     What is the best time to reach you: PREFERABLY BETWEEN 4-5 TODAY, ANYTIME    Who are you requesting to speak with (clinical staff, provider,  specific staff member): CLINICAL    PATIENT WOULD LIKE A CALL BACK FROM [facility].   "

## 2025-05-29 ENCOUNTER — TELEPHONE (OUTPATIENT)
Dept: INTERNAL MEDICINE | Facility: CLINIC | Age: 80
End: 2025-05-29
Payer: MEDICARE

## 2025-05-29 DIAGNOSIS — D64.89 ANEMIA DUE TO OTHER CAUSE, NOT CLASSIFIED: Primary | ICD-10-CM

## 2025-05-29 DIAGNOSIS — K92.1 MELENA: ICD-10-CM

## 2025-05-29 NOTE — TELEPHONE ENCOUNTER
Called to see why someone closed referral? Was told they would open referral and call pt to get him schedule.,

## 2025-05-29 NOTE — TELEPHONE ENCOUNTER
"Caller: Christian Mcintosh \"Dagoberto\"    Relationship: Self    Best call back number: PATIENT WOULD LIKE A CALL BACK FROM [facility] ABOUT HIS REFERRAL. PATIENT STATES THAT WHERE THE REFERRAL WAS SENT TO IN [place], THEY WONT BE ABLE TO GET IT DONE. PATIENT STATES THEIR SCHEDULE WAS TO FAR OUT. PATIENT WOULD LIKE THE REFERRAL SENT SOMEWHERE ELSE.   "

## 2025-06-04 DIAGNOSIS — D50.9 IRON DEFICIENCY ANEMIA, UNSPECIFIED IRON DEFICIENCY ANEMIA TYPE: Primary | ICD-10-CM

## 2025-06-04 PROBLEM — D50.8 IRON DEFICIENCY ANEMIA SECONDARY TO INADEQUATE DIETARY IRON INTAKE: Status: ACTIVE | Noted: 2025-06-04

## 2025-06-19 NOTE — PROGRESS NOTES
Cardiology Outpatient Visit      Identification: Christian Mcintosh is a 80 y.o. male who resides in [place], [place].     Reason for visit:  Bioprosthetic aortic valve  CAD without angina      Subjective      Dagoberto returns to the office today for routine follow-up of his bioprosthetic aortic valve, nonobstructive CAD and CV risk factors.  Overall he has been feeling well with no anginal or heart failure symptoms.  Has been having increased fatigue symptoms.  What has been concerning him is that over the last several months he has had gradual worsening anemia.  He had 1 episode with dark stools a month or so ago but his hemoglobin continues to decline.  He is scheduled for upper and lower endoscopy at the end of July.    Patient still goes to the gym and works out.  No chest pain.    Review of Systems   Constitutional: Positive for malaise/fatigue.   Eyes:  Negative for vision loss in left eye and vision loss in right eye.   Cardiovascular:  Negative for chest pain, dyspnea on exertion, near-syncope, orthopnea, palpitations, paroxysmal nocturnal dyspnea and syncope.   Musculoskeletal:  Negative for myalgias.   Neurological:  Negative for brief paralysis, excessive daytime sleepiness, focal weakness, numbness, paresthesias and weakness.   All other systems reviewed and are negative.      Allergies   Allergen Reactions    Shellfish-Derived Products Anaphylaxis         Current Outpatient Medications   Medication Instructions    albuterol sulfate  (90 Base) MCG/ACT inhaler 2 puffs, Inhalation, Every 4 Hours PRN    aspirin 81 mg, Oral, Daily    B Complex Vitamins (VITAMIN B COMPLEX) capsule capsule 1 capsule, Daily    Coenzyme Q10 (CO Q 10 PO) 1 capsule, Daily    DHEA 25 mg, Daily    levETIRAcetam (KEPPRA) 250 mg, Oral, Daily    lisinopril (PRINIVIL,ZESTRIL) 5 mg, Oral, Daily    metFORMIN (GLUCOPHAGE) 500 mg, Oral, 2 Times Daily With Meals    Multiple Vitamins-Minerals (CENTRUM ADULTS PO) 1 tablet, Daily    Omega-3  "Fatty Acids (FISH OIL PO) 1 tablet, Daily    pantoprazole (PROTONIX) 40 mg, Oral, Daily    rosuvastatin (CRESTOR) 5 mg, Oral, Nightly    vitamin D3 5,000 Units, Daily         Objective     /74 (BP Location: Right arm, Patient Position: Sitting, Cuff Size: Adult)   Pulse 64   Ht 170.2 cm (67\")   Wt 77.1 kg (170 lb)   SpO2 95%   BMI 26.63 kg/m²       Constitutional:       Appearance: Healthy appearance.   Eyes:      General: No scleral icterus.  Neck:      Thyroid: No thyroid mass.      Vascular: No carotid bruit or JVD. JVD normal.   Pulmonary:      Effort: Pulmonary effort is normal.      Breath sounds: Normal breath sounds.   Cardiovascular:      Normal rate. Regular rhythm.      Murmurs: There is no murmur.      No gallop.    Edema:     Peripheral edema absent.   Skin:     General: Skin is warm. There is no cyanosis.   Neurological:      General: No focal deficit present.      Mental Status: Alert.   Psychiatric:         Attention and Perception: Attention normal.         Result Review  (reviewed with patient):            Lab Results   Component Value Date    GLUCOSE 121 (H) 05/19/2025    BUN 15 05/19/2025    CREATININE 1.02 05/19/2025     05/19/2025    K 4.2 05/19/2025     05/19/2025    CALCIUM 9.1 05/19/2025    PROTEINTOT 6.9 05/19/2025    ALBUMIN 4.4 05/19/2025    ALT 17 05/19/2025    AST 27 05/19/2025    ALKPHOS 65 05/19/2025    BILITOT 0.3 05/19/2025    GLOB 2.5 05/19/2025    AGRATIO 1.8 05/19/2025    BCR 14.7 05/19/2025    ANIONGAP 12.1 05/19/2025    EGFR 75.7 05/19/2025     Lab Results   Component Value Date    WBC 6.80 05/19/2025    HGB 11.8 (L) 05/19/2025    HCT 35.6 (L) 05/19/2025    MCV 91.8 05/19/2025     05/19/2025     Lab Results   Component Value Date    CHOL 141 05/19/2025    CHLPL 140 11/06/2023    TRIG 165 (H) 05/19/2025    HDL 38 (L) 05/19/2025    LDL 75 05/19/2025     Lab Results   Component Value Date    HGBA1C 6.40 (H) 05/19/2025     @lpa@        Assessment "     Diagnoses and all orders for this visit:    1. Status post aortic valve replacement with bioprosthetic valve (Primary)  Overview:  Echocardiogram (08/08/2012): Moderate AS, KLAUDIA of 0.8 cm2.  Echocardiogram (02/26/2013): LVEF 55%, severe AS with mean gradient of 65 mmHg.   Bioprosthetic aortic valve replacement by Dr. Stuart Griffin (03/26/2013): 25-mm Magna Ease with ascending aortoplasty. Left atrial appendage excluded  Echo (6/15/18): LVEF 48%. Normal functioning bioprosthetic aortic valve. Trivial paravalvular leak.  Echo (4/23/2021): Echo unchanged from 6/15/2018  Echo (6/10/2024): LVEF 60%. A #25 Magna Ease bioprosthetic aortic valve is in place and appears to function normally. Mean gradient across about 13 mmHg.     Assessment & Plan:  Normal functioning bioprosthetic aortic valve on echo last year  Repeat echo in 2027  SBE prophylaxis prior to dental cleanings   Continue low-dose aspirin    Orders:  -     aspirin 81 MG EC tablet; Take 1 tablet by mouth Daily.    2. Coronary artery disease involving native coronary artery of native heart without angina pectoris  Overview:  Cardiac catheterization (03/06/2013): mild CAD    Assessment & Plan:  No angina  Continue aspirin and statin    Orders:  -     aspirin 81 MG EC tablet; Take 1 tablet by mouth Daily.    3. Hyperlipidemia LDL goal <70  Overview:  Statin therapy indicated given presence of coronary artery disease    Assessment & Plan:  Reasonably controlled  Continue rosuvastatin    Orders:  -     rosuvastatin (CRESTOR) 5 MG tablet; Take 1 tablet by mouth Every Night.  Dispense: 90 tablet; Refill: 3    4. Essential hypertension  Overview:  Target blood pressure <130/80 mmHg    Assessment & Plan:  Controlled  Continue present medical therapy            Plan   Continue present medical therapy      Follow-up   Return in about 1 year (around 6/20/2026).        Baljinder Spangler MD, FACC, [facility]  6/20/2025

## 2025-06-20 ENCOUNTER — OFFICE VISIT (OUTPATIENT)
Dept: CARDIOLOGY | Facility: CLINIC | Age: 80
End: 2025-06-20
Payer: MEDICARE

## 2025-06-20 VITALS
HEART RATE: 64 BPM | DIASTOLIC BLOOD PRESSURE: 74 MMHG | SYSTOLIC BLOOD PRESSURE: 118 MMHG | HEIGHT: 67 IN | BODY MASS INDEX: 26.68 KG/M2 | OXYGEN SATURATION: 95 % | WEIGHT: 170 LBS

## 2025-06-20 DIAGNOSIS — Z95.3 STATUS POST AORTIC VALVE REPLACEMENT WITH BIOPROSTHETIC VALVE: Primary | Chronic | ICD-10-CM

## 2025-06-20 DIAGNOSIS — E78.5 HYPERLIPIDEMIA LDL GOAL <70: ICD-10-CM

## 2025-06-20 DIAGNOSIS — I25.10 CORONARY ARTERY DISEASE INVOLVING NATIVE CORONARY ARTERY OF NATIVE HEART WITHOUT ANGINA PECTORIS: ICD-10-CM

## 2025-06-20 DIAGNOSIS — I10 ESSENTIAL HYPERTENSION: Chronic | ICD-10-CM

## 2025-06-20 RX ORDER — ASPIRIN 81 MG/1
81 TABLET ORAL DAILY
Start: 2025-06-20

## 2025-06-20 RX ORDER — ROSUVASTATIN CALCIUM 5 MG/1
5 TABLET, COATED ORAL NIGHTLY
Qty: 90 TABLET | Refills: 3 | Status: SHIPPED | OUTPATIENT
Start: 2025-06-20

## 2025-06-20 NOTE — ASSESSMENT & PLAN NOTE
Normal functioning bioprosthetic aortic valve on echo last year  Repeat echo in 2027  SBE prophylaxis prior to dental cleanings   Continue low-dose aspirin

## 2025-06-20 NOTE — ASSESSMENT & PLAN NOTE
[facility]  MD Murtaza White, MD Krista Mendoza, CNP   Lucille Beth, JOSE Fernandez, CNP  Progress Note    4/27/2024    SUBJECTIVE:   4/18: Laying in bed. No acute distress. Does have some dyspnea, though nothing out of the ordinary for her. Denies any chest pain today. Vital signs are stable.    4/19 HD today no reported issues  Will have stress test today    4/20 seen in her room on o2/nc lying flat in bed reports no issues to me appear more or less euvolemic   Cardiology note reviewed .      4/30: \"I have shingles.\"  Asymptomatic and was noted by her daughter who is visiting.  Denies all other complaint.  Breathing \"same as always.\"  On 3 L nasal cannula.  Denies dyspnea.  No cough or wheeze.  No chest pain or palpitations.  Appetite good.    4/22: Laying in bed. No acute distress. She is confused, though recognizes me. Tolerated HD today, though did get disoriented after treatment. Discharge held and CT of the head ordered. Vital signs are stable.     4/23: Laying in bed, family members at the bedside. She is still confused, but seems better than yesterday. Denies any dyspnea. She is on her normal requirements of oxygen. She is now on IV acyclovir.     4/24: Status quo.  Took oxygen off, immediately desaturated.  Satting 92% on 4 L nasal cannula, which is her baseline.      4/25: Laying in bed, no acute distress. Family are present at the bedside. Celebrating her birthday. She recognizes me, though still has some confusion. Seems to be improving. Denies dyspnea, chest pain, or palpitations.     4/26: Seen while laying in bed. No acute distress. Remains somewhat confused. Tolerated HD without issues. 1.5 liters removed. She denies any dyspnea, chest pain, or palpitations.     4/27: Feeling better than yesterday.  Per daughter bedside, mental status has improved.  Denies complaint.  Looking forward to discharge.  PICC line in place, will receive IV  Reasonably controlled  Continue rosuvastatin   stable-->hold calcium acetate for now  -Low potassium diet  -bmp in am          Electronically signed by Murtaza Dawson MD on 4/27/2024 at 11:35 AM

## 2025-06-23 ENCOUNTER — OFFICE VISIT (OUTPATIENT)
Dept: INTERNAL MEDICINE | Facility: CLINIC | Age: 80
End: 2025-06-23
Payer: MEDICARE

## 2025-06-23 VITALS
OXYGEN SATURATION: 96 % | BODY MASS INDEX: 27.94 KG/M2 | HEIGHT: 67 IN | SYSTOLIC BLOOD PRESSURE: 130 MMHG | HEART RATE: 60 BPM | DIASTOLIC BLOOD PRESSURE: 72 MMHG | WEIGHT: 178 LBS

## 2025-06-23 DIAGNOSIS — D64.89 ANEMIA DUE TO OTHER CAUSE, NOT CLASSIFIED: Primary | ICD-10-CM

## 2025-06-23 DIAGNOSIS — Z95.3 STATUS POST AORTIC VALVE REPLACEMENT WITH BIOPROSTHETIC VALVE: Chronic | ICD-10-CM

## 2025-06-23 DIAGNOSIS — I10 ESSENTIAL HYPERTENSION: Chronic | ICD-10-CM

## 2025-06-23 DIAGNOSIS — E78.5 HYPERLIPIDEMIA LDL GOAL <70: ICD-10-CM

## 2025-06-23 PROCEDURE — 3078F DIAST BP <80 MM HG: CPT | Performed by: INTERNAL MEDICINE

## 2025-06-23 PROCEDURE — 99214 OFFICE O/P EST MOD 30 MIN: CPT | Performed by: INTERNAL MEDICINE

## 2025-06-23 PROCEDURE — 3075F SYST BP GE 130 - 139MM HG: CPT | Performed by: INTERNAL MEDICINE

## 2025-06-23 PROCEDURE — 1126F AMNT PAIN NOTED NONE PRSNT: CPT | Performed by: INTERNAL MEDICINE

## 2025-06-23 RX ORDER — AMOXICILLIN 500 MG/1
2000 CAPSULE ORAL SEE ADMIN INSTRUCTIONS
Qty: 12 CAPSULE | Refills: 0 | Status: SHIPPED | OUTPATIENT
Start: 2025-06-23

## 2025-06-23 NOTE — PATIENT INSTRUCTIONS
EGD and colonoscopy are set up for July 18, 2025 for the anemia  CBC to be repeated in regimen  Continue all current medications including the PPI  Follow-up with ophthalmology  Continue excellent exercise  Continue excellent cardiac healthy diet  Continue follow-up with cardiology  Return visit in 4 weeks or as needed after the colonoscopy

## 2025-06-23 NOTE — PROGRESS NOTES
[facility]     Christian Mcintosh  1945   [id number]      Patient Care Team:  Merrill De La Paz MD as PCP - General (Internal Medicine)  Marshal Spangler IV, MD as Cardiologist (Interventional Cardiology)  Silvio Ahn MD as Consulting Physician (Urology)    Chief Complaint::   Chief Complaint   Patient presents with    Anemia     4 week follow up.  EGD and colonoscopy scheduled for 7/18/25            HPI  History of Present Illness  The patient is a 80-year-old male who presents for an office visit to follow up on anemia, with an EGD and colonoscopy scheduled for 07/18/2025. He has a history of aortic valve replacement, essential hypertension, and hyperlipidemia.    He reports experiencing fatigue but does not experience any lightheadedness. He maintains a regular gym routine, which he believes contributes to his overall well-being. His last colonoscopy was performed approximately 3 to 4 years ago. He reports no headaches, dizziness, neck pain or soreness, coughing, wheezing, heart racing, skipping, or palpitations. He also reports no constipation, diarrhea, or black bowel movements, although he did observe one instance of black stool and has been monitoring it closely since. He reports no abdominal pain but did experience some upper back pain two days ago, which he attributes to heavy lifting at the gym. He visits the gym three times a week, primarily engaging in light weightlifting and cycling. He has increased his sit-up count from 200 to 300, performing six sets of 50 each time. He continues to take Protonix.    He monitors his blood sugar levels weekly, with the most recent reading being 112.    He requests a refill of his amoxicillin prescription, as he has only four tablets remaining from his last prescription of 12. He had a dental appointment last week and typically sees his dentist every four months.    He has noticed that his eyes tire quickly and occasionally experiences  blurred vision, which he attributes to aging. However, these symptoms do not interfere with his ability to drive or watch television.    He saw Dr. Spangler on Friday, who listened to him and told him that the echocardiogram done last year looked as good as it did when it was put in. He is on rosuvastatin.    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    Testicular hypofunction    First degree AV block    BPH with urinary obstruction    Chronic GERD    Primary osteoarthritis of both knees    Health counseling    Osteoarthritis    Secondary osteoarthritis of right shoulder due to rotator cuff tear    Medicare annual wellness visit, subsequent    Ataxia    Medicare annual wellness visit, subsequent    Bilateral impacted cerumen    Medicare annual wellness visit, subsequent    Closed nondisplaced fracture of greater trochanter of right femur with routine healing    Fever    Medicare annual wellness visit, subsequent    Other fatigue    Other specified anemias    Iron deficiency anemia secondary to inadequate dietary iron intake        Past Medical History:   Diagnosis Date    Allergic 1966    seafood    Aortic stenosis     Aortic valve replaced 3/15/2013    Atrial flutter     RFA by Dr. Padilla    Benign prostatic hypertrophy w elevated PSA 01/03/2018    BX NO CANCER    Bicuspid aortic valve     Cataract     Coronary artery disease 03/06/2013    COVID-19 01/17/2022    Diverticulosis 2002    Erectile dysfunction  1/1/20    Age related    Gastric ulcer 2002    GERD (gastroesophageal reflux disease)     LX-Cdjvgao-Rggsr tear 07/23/2013    H/O atrial flutter     Heart murmur 1/1/1966    [employer]    History of basal cell cancer 2007    History of echocardiogram     History of rotator cuff tear     HL (hearing loss) 1/1/23    Age related    Hyperlipidemia     Hypertension     Osteoarthritis 2011    PONV (postoperative nausea and vomiting)     Seizure     Skin cancer     skin.  BASAL CELL SKIN CANCER LEFT CHEEK    Status post shoulder surgery     Tear of left rotator cuff     Tinnitus     Typical atrial flutter 06/07/2016    · Diagnosed postoperatively, asymptomatic. · Typical flutter pattern on ECG, 05/03/2013. Initiated on Coumadin. · Left atrial appendage excluded with AVR using Tiger Paw occluder. · Radiofrequency ablation by Dr. Brooks Padilla, July 2013.  · Coumadin initiated following ablation but stopped after development of GI bleeding and a Tarsha-Villeda tear.     Urinary retention     mcqueen    Visual impairment 1:1:15    Age related       Past Surgical History:   Procedure Laterality Date    AORTIC VALVE REPAIR/REPLACEMENT  3/15/2013    BASAL CELL CARCINOMA EXCISION  2007    BONE SPUR ARM      CARDIAC ABLATION      CARDIAC CATHETERIZATION  03/06/2013    CARDIAC VALVE REPLACEMENT  03/26/2013    bioprosthetic aortic valve    CARDIAC VALVE REPLACEMENT      CARPAL TUNNEL RELEASE      CATARACT EXTRACTION      COLONOSCOPY      COLONOSCOPY  08/29/2012    CYST REMOVAL      CYSTOSCOPY TRANSURETHRAL RESECTION OF PROSTATE N/A 01/03/2018    Procedure: PROSTATE ULTRASOUND AND BIOPSY, CYSTOSCOPY TRANSURETHRAL RESECTION OF PROSTATE GREENLIGHT WITH VAPORIZATION;  Surgeon: Silvio Ahn MD;  Location: [facility];  Service:     ENDOSCOPY  2002    w/biopsy    EYE SURGERY      bilateral cataract extraction    MCQUEEN CATHETER  11/01/2016    HERNIA REPAIR  2010    KNEE ARTHROSCOPY      KNEE MENISCAL REPAIR Bilateral     KNEE  SURGERY Left 2011    LASIK      PROSTATE BIOPSY  2018    PROSTATE SURGERY  2016    laser    ROTATOR CUFF REPAIR  10/26/2016    SHOULDER SURGERY Left     SKIN CANCER EXCISION  2016    left cheek    SKIN CANCER EXCISION      TURP / TRANSURETHRAL INCISION / DRAINAGE PROSTATE      VASECTOMY         Family History   Problem Relation Age of Onset    No Known Problems Mother     Heart attack Father 60        Father    Coronary artery disease Father     Early death Father         heart  age 60    Heart disease Brother         age 73    Diabetes Brother     Early death Brother         Age 75.    Diabetes Sister     Other Son         Car accident       Social History     Socioeconomic History    Marital status:    Tobacco Use    Smoking status: Former     Current packs/day: 0.00     Average packs/day: 2.0 packs/day for 23.2 years (46.3 ttl pk-yrs)     Types: Cigarettes     Start date: 1963     Quit date: 1986     Years since quittin.5    Smokeless tobacco: Never   Vaping Use    Vaping status: Never Used   Substance and Sexual Activity    Alcohol use: Yes     Alcohol/week: 1.0 standard drink of alcohol     Types: 1 Shots of liquor per week     Comment: occasional     Drug use: No    Sexual activity: Not Currently     Partners: Female       Allergies   Allergen Reactions    Shellfish-Derived Products Anaphylaxis       Review of Systems     HEENT: Denies headache or dizzy  NECK: Denies pain stiffness dysphagia  CHEST: Non-smoker denies cough wheeze or shortness of breath  CARDIAC: Remote AVR hypertension and hypercholesterolemia currently stable  ABD: Denies nausea vomiting abdominal pain had a single black stool several weeks ago with reduction of hemoglobin currently on multivitamin with iron and Protonix with scheduled EGD and colonoscopy upcoming  : Denies dysuria frequency  NEURO: Denies syncope concussion  PSYCH: Denies anxiety depression  EXTREM: Complains of mild extremity soreness  "denies edema  SKIN: Denies rash    Vital Signs  Vitals:    06/23/25 1535   BP: 130/72   BP Location: Right arm   Patient Position: Sitting   Cuff Size: Adult   Pulse: 60  Comment: SL IRREGULAR   SpO2: 96%   Weight: 80.7 kg (178 lb)   Height: 170.2 cm (67\")   PainSc: 0-No pain     Body mass index is 27.88 kg/m².        Advance Care Planning   ACP discussion was held with the patient during this visit. Patient has an advance directive (not in EMR), copy requested.       Current Outpatient Medications:     albuterol sulfate  (90 Base) MCG/ACT inhaler, Inhale 2 puffs Every 4 (Four) Hours As Needed for Wheezing., Disp: 18 g, Rfl: 0    amoxicillin (AMOXIL) 500 MG capsule, Take 4 capsules by mouth See Admin Instructions. 4 capsules 1 hour prior to procedure, Disp: 12 capsule, Rfl: 0    aspirin 81 MG EC tablet, Take 1 tablet by mouth Daily., Disp: , Rfl:     B Complex Vitamins (VITAMIN B COMPLEX) capsule capsule, Take 1 capsule by mouth Daily., Disp: , Rfl:     Cholecalciferol (VITAMIN D3) 5000 units capsule capsule, Take 1 capsule by mouth Daily., Disp: , Rfl:     Coenzyme Q10 (CO Q 10 PO), Take 1 capsule by mouth Daily., Disp: , Rfl:     DHEA 25 MG capsule, Take 25 mg by mouth Daily., Disp: , Rfl:     levETIRAcetam (KEPPRA) 500 MG tablet, TAKE 1/2 TABLET BY MOUTH DAILY, Disp: 45 tablet, Rfl: 3    lisinopril (PRINIVIL,ZESTRIL) 5 MG tablet, Take 1 tablet by mouth Daily., Disp: 90 tablet, Rfl: 3    metFORMIN (GLUCOPHAGE) 500 MG tablet, Take 1 tablet by mouth 2 (Two) Times a Day With Meals., Disp: 180 tablet, Rfl: 1    Multiple Vitamins-Minerals (CENTRUM ADULTS PO), Take 1 tablet by mouth Daily., Disp: , Rfl:     Omega-3 Fatty Acids (FISH OIL PO), Take 1 tablet by mouth Daily., Disp: , Rfl:     pantoprazole (PROTONIX) 40 MG EC tablet, TAKE 1 TABLET BY MOUTH DAILY, Disp: 90 tablet, Rfl: 3    rosuvastatin (CRESTOR) 5 MG tablet, Take 1 tablet by mouth Every Night., Disp: 90 tablet, Rfl: 3    Physical Exam     ACE III MINI "        Physical Exam  Physical Exam  Respiratory: Clear to auscultation, no wheezing, rales or rhonchi  Cardiovascular: Regular rate and rhythm, no murmurs, rubs, or gallops. Aortic valve sounds good.  Other: Blood pressure is 124/68.  HEENT: No facial asymmetry  NECK: No mass bruit or thyromegaly  CHEST: Clear  CARDIAC: Regular rhythm without gallop or rub blood pressure heart rate stable  ABD: Liver and spleen normal good bowel sounds nontender  : Deferred  NEURO: Intact  PSYCH: Normal  EXTREM: Minimal arthritic change no edema  Skin: No rash     Results Review:    No results found for this or any previous visit (from the past 4 weeks).    Procedures CBC pending    Medication Review: Medications reviewed and noted    Patient wellness counseling  Exercise: Continue active ADL including exercise  Diet: Healthy cardiac diet  Screening: CBC pending  Social History     Socioeconomic History    Marital status:    Tobacco Use    Smoking status: Former     Current packs/day: 0.00     Average packs/day: 2.0 packs/day for 23.2 years (46.3 ttl pk-yrs)     Types: Cigarettes     Start date: 1963     Quit date: 1986     Years since quittin.5    Smokeless tobacco: Never   Vaping Use    Vaping status: Never Used   Substance and Sexual Activity    Alcohol use: Yes     Alcohol/week: 1.0 standard drink of alcohol     Types: 1 Shots of liquor per week     Comment: occasional     Drug use: No    Sexual activity: Not Currently     Partners: Female        Assessment/Plan:    Assessment & Plan  1. Anemia.  - Anemia is slightly low, contributing to fatigue.  - No need for transfusion at this time.  - Repeat CBC will be ordered to monitor condition.  - Continue exercise regimen and maintain an active lifestyle.    2. Hyperlipidemia.  - Currently on rosuvastatin (Crestor).  - Medication is effective; continue as prescribed.  - No reported side effects or issues with medication.  - Regular monitoring of lipid levels  recommended.    3. Gastroesophageal reflux disease (GERD).  - Currently taking Protonix.  - Medication is effective; continue as prescribed.  - No reported issues with constipation, diarrhea, or black bowel movements.  - Regular monitoring of symptoms recommended.    4. Blurred vision.  - Eyes tire quickly and occasional blurred vision noted.  - Symptoms attributed to aging; no interference with driving or watching television.  - No immediate intervention required.  - Regular eye examinations recommended.    5. Health maintenance.  - Prescription for amoxicillin sent to pharmacy for dental prophylaxis.  - Continue regular dental visits.  - No reported issues with heart racing, skipping, or palpitations.  - Follow-up scheduled in 4 weeks after colonoscopy on 07/18/2025.    Problem List Items Addressed This Visit          Cardiac and Vasculature    Status post aortic valve replacement with bioprosthetic valve (Chronic)    Overview   Echocardiogram (08/08/2012): Moderate AS, KLAUDIA of 0.8 cm2.  Echocardiogram (02/26/2013): LVEF 55%, severe AS with mean gradient of 65 mmHg.   Bioprosthetic aortic valve replacement by Dr. Stuart Griffin (03/26/2013): 25-mm Magna Ease with ascending aortoplasty. Left atrial appendage excluded  Echo (6/15/18): LVEF 48%. Normal functioning bioprosthetic aortic valve. Trivial paravalvular leak.  Echo (4/23/2021): Echo unchanged from 6/15/2018  Echo (6/10/2024): LVEF 60%. A #25 Magna Ease bioprosthetic aortic valve is in place and appears to function normally. Mean gradient across about 13 mmHg.          Current Assessment & Plan   Bioprosthetic aortic valve replacement March 2013 and currently stable on Crestor 5 lisinopril 5 mg and aspirin 81 mg continue therapy and follow-up with cardiology         Relevant Medications    aspirin 81 MG EC tablet    amoxicillin (AMOXIL) 500 MG capsule    Essential hypertension (Chronic)    Overview   Target blood pressure <130/80 mmHg         Current  Assessment & Plan   Essential hypertension with blood pressure 130/72 heart rate 60 and O2 saturation 96% on lisinopril 5 mg daily denies headache or cough continue therapy         Relevant Medications    lisinopril (PRINIVIL,ZESTRIL) 5 MG tablet    Hyperlipidemia LDL goal <70    Overview   Statin therapy indicated given presence of coronary artery disease         Current Assessment & Plan    Mixed hyperlipidemia on rosuvastatin 5 mg daily denies myalgia arthralgia continue therapy         Relevant Medications    rosuvastatin (CRESTOR) 5 MG tablet       Hematology and Neoplasia    Other specified anemias - Primary    Overview   Lab work of May 19, 2025 hemoglobin of 11.8 hematocrit of 35.6 with normal white count and platelet count compared to last CBC of November 11, 2024 with a hemoglobin of 14.8 hematocrit of 43.8  Patient states that he thought he had some rectal bleeding recently with black tarry bowel stool for several days denies abdominal pain denies red blood,  Rectal exam negative Hemoccult however no stool was found in the rectal vault  Patient's last colonoscopy was February 15, 2022 by Dr. Pablo Mueller showing diverticula with no polyps or growth         Current Assessment & Plan   Recent anemia of 11.8 g with previous CBC showing a hemoglobin of 14.8 the patient has had 1 episode of black stool, he is currently on pantoprazole 40 mg daily and Centrum multivitamin and is scheduled EGD and colonoscopy with Dr. Galindo on July 18, 2025 the patient overall feels well denies lightheadedness or dizziness to continue current therapy         Relevant Orders    CBC & Differential        Patient Instructions   EGD and colonoscopy are set up for July 18, 2025 for the anemia  CBC to be repeated in regimen  Continue all current medications including the PPI  Follow-up with ophthalmology  Continue excellent exercise  Continue excellent cardiac healthy diet  Continue follow-up with cardiology  Return visit in 4 weeks  or as needed after the colonoscopy     Plan of care reviewed with patient at the conclusion of today's visit. Education was provided regarding diagnosis, management, and any prescribed or recommended OTC medications.Patient verbalizes understanding of and agreement with management plan.         Patient or patient representative verbalized consent for the use of Ambient Listening during the visit with  Merrill De La Paz MD for chart documentation. 6/23/2025  21:01 EDT      Answers submitted by the patient for this visit:  Problem not listed (Submitted on 6/16/2025)  Chief Complaint: Other medical problem  Reason for appointment: Follow up  anorexia: No  joint pain: Yes  change in stool: No  headaches: No  joint swelling: No  vertigo: No  visual change: Yes  Onset: 6 to 12 months  Chronicity: recurrent  Frequency: daily

## 2025-06-24 NOTE — ASSESSMENT & PLAN NOTE
Bioprosthetic aortic valve replacement March 2013 and currently stable on Crestor 5 lisinopril 5 mg and aspirin 81 mg continue therapy and follow-up with cardiology

## 2025-06-24 NOTE — ASSESSMENT & PLAN NOTE
Recent anemia of 11.8 g with previous CBC showing a hemoglobin of 14.8 the patient has had 1 episode of black stool, he is currently on pantoprazole 40 mg daily and Centrum multivitamin and is scheduled EGD and colonoscopy with Dr. Galindo on July 18, 2025 the patient overall feels well denies lightheadedness or dizziness to continue current therapy

## 2025-06-24 NOTE — ASSESSMENT & PLAN NOTE
Essential hypertension with blood pressure 130/72 heart rate 60 and O2 saturation 96% on lisinopril 5 mg daily denies headache or cough continue therapy

## 2025-07-03 RX ORDER — PANTOPRAZOLE SODIUM 40 MG/1
40 TABLET, DELAYED RELEASE ORAL DAILY
Qty: 90 TABLET | Refills: 3 | Status: SHIPPED | OUTPATIENT
Start: 2025-07-03

## 2025-07-23 ENCOUNTER — RESULTS FOLLOW-UP (OUTPATIENT)
Dept: INTERNAL MEDICINE | Facility: CLINIC | Age: 80
End: 2025-07-23
Payer: MEDICARE

## 2025-07-23 PROBLEM — Z98.890 HISTORY OF ESOPHAGOGASTRODUODENOSCOPY (EGD): Status: ACTIVE | Noted: 2025-07-23

## 2025-07-23 PROBLEM — Z98.890 HISTORY OF COLONOSCOPY: Status: ACTIVE | Noted: 2025-07-23

## 2025-07-28 ENCOUNTER — OFFICE VISIT (OUTPATIENT)
Dept: INTERNAL MEDICINE | Facility: CLINIC | Age: 80
End: 2025-07-28
Payer: MEDICARE

## 2025-07-28 VITALS
DIASTOLIC BLOOD PRESSURE: 84 MMHG | HEART RATE: 64 BPM | BODY MASS INDEX: 27.47 KG/M2 | OXYGEN SATURATION: 96 % | SYSTOLIC BLOOD PRESSURE: 130 MMHG | HEIGHT: 67 IN | WEIGHT: 175 LBS

## 2025-07-28 DIAGNOSIS — Z95.3 STATUS POST AORTIC VALVE REPLACEMENT WITH BIOPROSTHETIC VALVE: Chronic | ICD-10-CM

## 2025-07-28 DIAGNOSIS — Z98.890 HISTORY OF COLONOSCOPY: ICD-10-CM

## 2025-07-28 DIAGNOSIS — D50.8 IRON DEFICIENCY ANEMIA SECONDARY TO INADEQUATE DIETARY IRON INTAKE: ICD-10-CM

## 2025-07-28 DIAGNOSIS — I10 ESSENTIAL HYPERTENSION: Chronic | ICD-10-CM

## 2025-07-28 DIAGNOSIS — Z98.890 HISTORY OF ESOPHAGOGASTRODUODENOSCOPY (EGD): ICD-10-CM

## 2025-07-28 DIAGNOSIS — E11.9 TYPE 2 DIABETES MELLITUS WITHOUT COMPLICATION, WITHOUT LONG-TERM CURRENT USE OF INSULIN: ICD-10-CM

## 2025-07-28 DIAGNOSIS — D64.89 ANEMIA DUE TO OTHER CAUSE, NOT CLASSIFIED: Primary | ICD-10-CM

## 2025-07-28 DIAGNOSIS — E78.5 HYPERLIPIDEMIA LDL GOAL <70: ICD-10-CM

## 2025-07-28 DIAGNOSIS — I25.10 CORONARY ARTERY DISEASE INVOLVING NATIVE CORONARY ARTERY OF NATIVE HEART WITHOUT ANGINA PECTORIS: ICD-10-CM

## 2025-07-28 PROCEDURE — 1159F MED LIST DOCD IN RCRD: CPT | Performed by: INTERNAL MEDICINE

## 2025-07-28 PROCEDURE — 3075F SYST BP GE 130 - 139MM HG: CPT | Performed by: INTERNAL MEDICINE

## 2025-07-28 PROCEDURE — 1126F AMNT PAIN NOTED NONE PRSNT: CPT | Performed by: INTERNAL MEDICINE

## 2025-07-28 PROCEDURE — 3079F DIAST BP 80-89 MM HG: CPT | Performed by: INTERNAL MEDICINE

## 2025-07-28 PROCEDURE — 99214 OFFICE O/P EST MOD 30 MIN: CPT | Performed by: INTERNAL MEDICINE

## 2025-07-28 PROCEDURE — 1160F RVW MEDS BY RX/DR IN RCRD: CPT | Performed by: INTERNAL MEDICINE

## 2025-07-28 PROCEDURE — G2211 COMPLEX E/M VISIT ADD ON: HCPCS | Performed by: INTERNAL MEDICINE

## 2025-07-28 RX ORDER — FERROUS FUM/VIT C/B12-IF/FOLIC 110-0.5MG
1 CAPSULE ORAL
Qty: 90 CAPSULE | Refills: 2 | Status: SHIPPED | OUTPATIENT
Start: 2025-07-28 | End: 2025-07-29

## 2025-07-28 NOTE — PROGRESS NOTES
[facility]     Christian Mcintosh  1945   [id number]      Patient Care Team:  Merrill De La Paz MD as PCP - General (Internal Medicine)  Marshal Spangler IV, MD as Cardiologist (Interventional Cardiology)  Silvio Ahn MD as Consulting Physician (Urology)    Chief Complaint::   Chief Complaint   Patient presents with    Anemia     4 week follow up.  EGD and colonoscopy completed - EGD and colonoscopy performed July 18, 2025 by Dr. Galindo showed mild esophageal reflux and diverticula with no specific source of blood loss            HPI  History of Present Illness  The patient is a 80-year-old male with recent anemia. He has undergone EGD and colonoscopy on 07/18/2025 by Dr. Blake, which showed mild esophageal reflux and diverticula but no evidence of a source of blood loss with iron deficiency anemia. Repeated hemoglobin was 12.2 on 06/25/2025, showing improvement.    He reports feeling fatigued, even though he felt well enough to attend the gym this morning. As the day progresses, he experiences a desire to rest and nap. He is not experiencing any pain or abnormal bowel movements. He is currently taking a multivitamin but is unsure if it contains iron. He also takes vitamin B12 and folic acid supplements.    His blood sugar levels, as measured by home strips, have been ranging between 115 and 130. He reports no headaches, dizziness, lightheadedness, difficulty swallowing, coughing, wheezing, shortness of breath, or chest pain.    He mentions that he occasionally wakes up at night to urinate, but this is not a consistent issue. He recalls one instance last week where he had to urinate 10 times during the night. He typically goes to bed before 11:00 PM and wakes up around 6:00 or 7:00 AM.    Social History:  Alcohol: He consumes alcohol occasionally, noting that it might have caused increased urination one night.  Coffee/Tea/Caffeine-containing Drinks: He consumes iced caffeine.  Sleep:  He typically goes to bed before 11:00 PM and wakes up around 6:00 or 7:00 AM.      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    Testicular hypofunction    First degree AV block    BPH with urinary obstruction    Chronic GERD    Primary osteoarthritis of both knees    Health counseling    Osteoarthritis    Secondary osteoarthritis of right shoulder due to rotator cuff tear    Medicare annual wellness visit, subsequent    Ataxia    Medicare annual wellness visit, subsequent    Bilateral impacted cerumen    Medicare annual wellness visit, subsequent    Closed nondisplaced fracture of greater trochanter of right femur with routine healing    Fever    Medicare annual wellness visit, subsequent    Other fatigue    Other specified anemias    Iron deficiency anemia secondary to inadequate dietary iron intake    History of esophagogastroduodenoscopy (EGD)    History of colonoscopy        Past Medical History:   Diagnosis Date    Allergic 1966    seafood    Aortic stenosis     Aortic valve replaced 3/15/2013    Atrial flutter     RFA by Dr. Padilla    Benign prostatic hypertrophy w elevated PSA 01/03/2018    BX NO CANCER    Bicuspid aortic valve     Cataract     Coronary artery disease 03/06/2013    COVID-19 01/17/2022    Diverticulosis 2002    Erectile dysfunction 1/1/20    Age related    Gastric ulcer 2002    GERD (gastroesophageal reflux disease)     WG-Czwcecs-Jdwaf tear 07/23/2013    H/O atrial flutter     Heart murmur 1/1/1966    [employer]    History of  basal cell cancer 2007    History of echocardiogram     History of rotator cuff tear     HL (hearing loss) 1/1/23    Age related    Hyperlipidemia     Hypertension     Osteoarthritis 2011    PONV (postoperative nausea and vomiting)     Seizure     Skin cancer     skin.  BASAL CELL SKIN CANCER LEFT CHEEK    Status post shoulder surgery     Tear of left rotator cuff     Tinnitus     Typical atrial flutter 06/07/2016    · Diagnosed postoperatively, asymptomatic. · Typical flutter pattern on ECG, 05/03/2013. Initiated on Coumadin. · Left atrial appendage excluded with AVR using Tiger Paw occluder. · Radiofrequency ablation by Dr. Brooks Padilla, July 2013.  · Coumadin initiated following ablation but stopped after development of GI bleeding and a Tarsha-Villeda tear.     Urinary retention     mcqueen    Visual impairment 1:1:15    Age related       Past Surgical History:   Procedure Laterality Date    AORTIC VALVE REPAIR/REPLACEMENT  3/15/2013    BASAL CELL CARCINOMA EXCISION  2007    BONE SPUR ARM      CARDIAC ABLATION      CARDIAC CATHETERIZATION  03/06/2013    CARDIAC VALVE REPLACEMENT  03/26/2013    bioprosthetic aortic valve    CARDIAC VALVE REPLACEMENT      CARPAL TUNNEL RELEASE      CATARACT EXTRACTION      COLONOSCOPY      COLONOSCOPY  08/29/2012    CYST REMOVAL      CYSTOSCOPY TRANSURETHRAL RESECTION OF PROSTATE N/A 01/03/2018    Procedure: PROSTATE ULTRASOUND AND BIOPSY, CYSTOSCOPY TRANSURETHRAL RESECTION OF PROSTATE GREENLIGHT WITH VAPORIZATION;  Surgeon: Silivo Ahn MD;  Location: [facility] OR;  Service:     ENDOSCOPY  2002    w/biopsy    EYE SURGERY      bilateral cataract extraction    MCQUEEN CATHETER  11/01/2016    HERNIA REPAIR  2010    KNEE ARTHROSCOPY      KNEE MENISCAL REPAIR Bilateral     KNEE SURGERY Left 2011    LASIK      PROSTATE BIOPSY  01/03/2018    PROSTATE SURGERY  2016    laser    ROTATOR CUFF REPAIR  10/26/2016    SHOULDER SURGERY Left     SKIN CANCER EXCISION  12/07/2016    left cheek     SKIN CANCER EXCISION      TURP / TRANSURETHRAL INCISION / DRAINAGE PROSTATE      VASECTOMY         Family History   Problem Relation Age of Onset    No Known Problems Mother     Heart attack Father 60        Father    Coronary artery disease Father     Early death Father         heart  age 60    Heart disease Brother         age 73    Diabetes Brother     Early death Brother         Age 75.    Diabetes Sister     Other Son         Car accident       Social History     Socioeconomic History    Marital status:    Tobacco Use    Smoking status: Former     Current packs/day: 0.00     Average packs/day: 2.0 packs/day for 23.3 years (46.5 ttl pk-yrs)     Types: Cigarettes     Start date: 1963     Quit date: 1986     Years since quittin.5    Smokeless tobacco: Never   Vaping Use    Vaping status: Never Used   Substance and Sexual Activity    Alcohol use: Yes     Alcohol/week: 1.0 standard drink of alcohol     Types: 1 Shots of liquor per week     Comment: occasional     Drug use: No    Sexual activity: Not Currently     Partners: Female       Allergies   Allergen Reactions    Shellfish-Derived Products Anaphylaxis       Review of Systems     HEENT: Denies head ache or dizzy  NECK: Denies pain stiffness dysphagia  CHEST: Denies cough wheeze or shortness of breath  CARDIAC: Denies chest pain or pressure history of aortic valve replacement  ABD: Denies nausea vomiting abdominal pain recent EGD showing mild esophageal reflux no bleeding and colonoscopy showing diverticuli with no bleeding both performed 2025  : Denies dysuria recently had urinary frequency but no dysuria or blood patient states it may have been related to evening drink with friend  NEURO: Denies syncope concussion neuropathy  PSYCH: Normal  EXTREM: Mild arthritic changes  SKIN: No rash    Vital Signs  Vitals:    25 1454   BP: 130/84   BP Location: Left arm   Patient Position: Sitting   Cuff Size: Adult   Pulse:  "64  Comment: irregular   SpO2: 96%   Weight: 79.4 kg (175 lb)   Height: 170.2 cm (67\")   PainSc: 0-No pain     Body mass index is 27.41 kg/m².        Advance Care Planning   ACP discussion was held with the patient during this visit. Patient has an advance directive (not in EMR), copy requested.       Current Outpatient Medications:     albuterol sulfate  (90 Base) MCG/ACT inhaler, Inhale 2 puffs Every 4 (Four) Hours As Needed for Wheezing., Disp: 18 g, Rfl: 0    aspirin 81 MG EC tablet, Take 1 tablet by mouth Daily., Disp: , Rfl:     B Complex Vitamins (VITAMIN B COMPLEX) capsule capsule, Take 1 capsule by mouth Daily., Disp: , Rfl:     Cholecalciferol (VITAMIN D3) 5000 units capsule capsule, Take 1 capsule by mouth Daily., Disp: , Rfl:     Coenzyme Q10 (CO Q 10 PO), Take 1 capsule by mouth Daily., Disp: , Rfl:     DHEA 25 MG capsule, Take 25 mg by mouth Daily., Disp: , Rfl:     levETIRAcetam (KEPPRA) 500 MG tablet, TAKE 1/2 TABLET BY MOUTH DAILY, Disp: 45 tablet, Rfl: 3    lisinopril (PRINIVIL,ZESTRIL) 5 MG tablet, Take 1 tablet by mouth Daily., Disp: 90 tablet, Rfl: 3    metFORMIN (GLUCOPHAGE) 500 MG tablet, Take 1 tablet by mouth 2 (Two) Times a Day With Meals., Disp: 180 tablet, Rfl: 1    Multiple Vitamins-Minerals (CENTRUM ADULTS PO), Take 1 tablet by mouth Daily., Disp: , Rfl:     Omega-3 Fatty Acids (FISH OIL PO), Take 1 tablet by mouth Daily., Disp: , Rfl:     pantoprazole (PROTONIX) 40 MG EC tablet, TAKE 1 TABLET BY MOUTH DAILY, Disp: 90 tablet, Rfl: 3    rosuvastatin (CRESTOR) 5 MG tablet, Take 1 tablet by mouth Every Night., Disp: 90 tablet, Rfl: 3    ferrous fumarate-b12-vitamic C-folic acid (Ferocon) capsule, Take 1 capsule by mouth Every Morning Before Breakfast., Disp: 90 capsule, Rfl: 2    Physical Exam     ACE III MINI        Physical Exam  Physical Exam  Ears: Normal  Eyes: Conjunctiva not pale  Nose: Normal  Mouth/Throat: Normal  HEENT: No facial asymmetry conjunctiva normal " coloration  NECK: No mass bruit or thyromegaly  CHEST: Clear  CARDIAC: Regular rhythm stable blood pressure soft murmur of AVR  ABD: Normal  : Deferred  NEURO: Intact  PSYCH: Normal  EXTREM: Minimal arthritic change no edema  Skin: No rash     Results Review:    No results found for this or any previous visit (from the past 4 weeks).    Procedures recent EGD and colonoscopy of  noted    Medication Review: Medications reviewed and noted    Patient wellness counseling  Exercise: Continue ADL  Diet: Continue healthy cardiac healthy diabetic diet  Screening: Most recent hemoglobin 12.2 improved done on 2025  Social History     Socioeconomic History    Marital status:    Tobacco Use    Smoking status: Former     Current packs/day: 0.00     Average packs/day: 2.0 packs/day for 23.3 years (46.5 ttl pk-yrs)     Types: Cigarettes     Start date: 1963     Quit date: 1986     Years since quittin.5    Smokeless tobacco: Never   Vaping Use    Vaping status: Never Used   Substance and Sexual Activity    Alcohol use: Yes     Alcohol/week: 1.0 standard drink of alcohol     Types: 1 Shots of liquor per week     Comment: occasional     Drug use: No    Sexual activity: Not Currently     Partners: Female        Assessment/Plan:    Assessment & Plan  1. Anemia.  - Hemoglobin level improved from 11.8 on 2025 to 12.2 on 2025.  - EGD and colonoscopy on 2025 showed mild esophageal reflux and diverticula but no source of blood loss.  - Currently taking a multivitamin, unsure if it contains iron.  - Prescription for ferrous sulfate with B12 and folic acid will be sent to pharmacy to help build up iron levels faster.    2. Blood sugar management.  - Blood sugar A1c improved from 6.8 to 6.4, indicating good control.  - Average blood sugar over 90 days is approximately 132.  - Reports blood sugar levels at home between 115-130.  - Advised to continue monitoring blood sugar levels and maintain  current management plan.    3. Nocturia.  - Reports occasional nocturia, with instances of getting up multiple times at night to urinate.  - May be related to caffeine or alcohol intake, particularly in the evenings.  - Advised to monitor fluid intake, especially in the evenings, and reduce consumption of diuretic beverages like caffeine and alcohol.    Follow-up: 11/21/2025.    Problem List Items Addressed This Visit          Cardiac and Vasculature    Status post aortic valve replacement with bioprosthetic valve (Chronic)    Overview   Echocardiogram (08/08/2012): Moderate AS, KLAUDIA of 0.8 cm2.  Echocardiogram (02/26/2013): LVEF 55%, severe AS with mean gradient of 65 mmHg.   Bioprosthetic aortic valve replacement by Dr. Stuart Griffin (03/26/2013): 25-mm Magna Ease with ascending aortoplasty. Left atrial appendage excluded  Echo (6/15/18): LVEF 48%. Normal functioning bioprosthetic aortic valve. Trivial paravalvular leak.  Echo (4/23/2021): Echo unchanged from 6/15/2018  Echo (6/10/2024): LVEF 60%. A #25 Magna Ease bioprosthetic aortic valve is in place and appears to function normally. Mean gradient across about 13 mmHg.          Current Assessment & Plan   Aortic valve replacement March 2013 currently stable on Crestor lisinopril and aspirin, continue follow-up with cardiology         Relevant Medications    aspirin 81 MG EC tablet    Essential hypertension (Chronic)    Overview   Target blood pressure <130/80 mmHg         Current Assessment & Plan   Blood pressure 130/84, heart rate 64 and O2 saturation 96% on lisinopril 5 mg daily denies headache or cough continue therapy         Relevant Medications    lisinopril (PRINIVIL,ZESTRIL) 5 MG tablet    Coronary artery disease involving native coronary artery of native heart without angina pectoris    Overview   Cardiac catheterization (03/06/2013): mild CAD         Current Assessment & Plan   Cardiac cath March 2013 with mild coronary disease currently stable  without chest pain pressure/palpitations on Crestor 5 mg daily lisinopril 5 mg daily aspirin 81 mg daily         Relevant Medications    aspirin 81 MG EC tablet    Hyperlipidemia LDL goal <70    Overview   Statin therapy indicated given presence of coronary artery disease         Current Assessment & Plan    Coronary disease essential hypertension and diabetes on rosuvastatin 5 mg daily denies myalgia or arthralgia continue therapy         Relevant Medications    rosuvastatin (CRESTOR) 5 MG tablet       Endocrine and Metabolic    Type 2 diabetes mellitus without complication, without long-term current use of insulin    Overview   Diabetes type 2 on metformin 500 mg twice daily with lab work on 11/11/2024 A1c 6.80 and fasting blood sugar 119 with GFR 75 and creatinine of 1.02 continue therapy and encouraged healthy cardiac healthy diabetic reduce carb diet         Current Assessment & Plan   Type 2 diabetes on metformin 500 twice daily continue therapy and healthy cardiac diabetic diet with reduced carbs         Relevant Medications    lisinopril (PRINIVIL,ZESTRIL) 5 MG tablet    metFORMIN (GLUCOPHAGE) 500 MG tablet       Gastrointestinal Abdominal     History of esophagogastroduodenoscopy (EGD)    Overview   EGD of July 18, 2025 shows mild esophageal reflux by Dr. Galindo         Current Assessment & Plan   EGD of July 18, 2025 shows esophageal reflux and currently on pantoprazole 40 mg daily no bleeding/         History of colonoscopy    Overview   Colonoscopy performed July 18, 2025 by Dr. Galindo showing diverticuli no bleeding source         Current Assessment & Plan   Investigation for anemia with colonoscopy July 18, 2025 showing diverticuli with no bleeding sites  And EGD showed mild reflux, the patient is on pantoprazole 40 mg daily to continue therapy            Hematology and Neoplasia    Other specified anemias - Primary    Overview   Lab work of May 19, 2025 hemoglobin of 11.8 hematocrit of 35.6 with  normal white count and platelet count compared to last CBC of November 11, 2024 with a hemoglobin of 14.8 hematocrit of 43.8  Patient states that he thought he had some rectal bleeding recently with black tarry bowel stool for several days denies abdominal pain denies red blood,  Rectal exam negative Hemoccult however no stool was found in the rectal vault  Patient's last colonoscopy was February 15, 2022 by Dr. Pablo Mueller showing diverticula with no polyps or growth         Current Assessment & Plan   Vesication of anemia for GI blood loss with colonoscopy and EGD on July 18, 2025 showing diverticuli of the colon no bleeding and mild esophageal reflux showing no bleeding on pantoprazole 40 mg daily to continue therapy  Suggest continued multiple vitamin daily  Will add Ferocon 1 capsule daily  Most recent hemoglobin was 12.2 g on June 24, 2025 with previous hemoglobin of 11.8 g on May 19, 2025         Relevant Medications    ferrous fumarate-b12-vitamic C-folic acid (Ferocon) capsule    Iron deficiency anemia secondary to inadequate dietary iron intake    Overview   Recent anemia of 11.8 g with negative Hemoccult with previous hemoglobin of 14.8 November 2024  Requesting EGD and colonoscopy         Relevant Medications    ferrous fumarate-b12-vitamic C-folic acid (Ferocon) capsule        Patient Instructions   Start Ferocon 1 daily  Continue medications  Return for annual wellness visit November 2025     Plan of care reviewed with patient at the conclusion of today's visit. Education was provided regarding diagnosis, management, and any prescribed or recommended OTC medications.Patient verbalizes understanding of and agreement with management plan.         Patient or patient representative verbalized consent for the use of Ambient Listening during the visit with  Merrill De La Paz MD for chart documentation. 7/28/2025  20:40 EDT      Answers submitted by the patient for this visit:  Chronic Condition Follow-up  (Submitted on 7/21/2025)  Chief Complaint: PCP follow-up  other: Yes  Medication compliance: all of the time  Treatment barriers: no complaince problems  Exercise: three times a week

## 2025-07-29 ENCOUNTER — TELEPHONE (OUTPATIENT)
Dept: INTERNAL MEDICINE | Facility: CLINIC | Age: 80
End: 2025-07-29

## 2025-07-29 RX ORDER — FERROUS SULFATE 325(65) MG
325 TABLET ORAL
Qty: 90 TABLET | Refills: 40 | Status: SHIPPED | OUTPATIENT
Start: 2025-07-29 | End: 2025-07-29

## 2025-07-29 RX ORDER — FERROUS SULFATE 325(65) MG
325 TABLET ORAL
Qty: 90 TABLET | Refills: 1 | Status: SHIPPED | OUTPATIENT
Start: 2025-07-29

## 2025-07-29 NOTE — ASSESSMENT & PLAN NOTE
Aortic valve replacement March 2013 currently stable on Crestor lisinopril and aspirin, continue follow-up with cardiology

## 2025-07-29 NOTE — ASSESSMENT & PLAN NOTE
Cardiac cath March 2013 with mild coronary disease currently stable without chest pain pressure/palpitations on Crestor 5 mg daily lisinopril 5 mg daily aspirin 81 mg daily

## 2025-07-29 NOTE — ASSESSMENT & PLAN NOTE
Vesication of anemia for GI blood loss with colonoscopy and EGD on July 18, 2025 showing diverticuli of the colon no bleeding and mild esophageal reflux showing no bleeding on pantoprazole 40 mg daily to continue therapy  Suggest continued multiple vitamin daily  Will add Ferocon 1 capsule daily  Most recent hemoglobin was 12.2 g on June 24, 2025 with previous hemoglobin of 11.8 g on May 19, 2025

## 2025-07-29 NOTE — ASSESSMENT & PLAN NOTE
Type 2 diabetes on metformin 500 twice daily continue therapy and healthy cardiac diabetic diet with reduced carbs

## 2025-07-29 NOTE — ASSESSMENT & PLAN NOTE
Blood pressure 130/84, heart rate 64 and O2 saturation 96% on lisinopril 5 mg daily denies headache or cough continue therapy

## 2025-07-29 NOTE — ASSESSMENT & PLAN NOTE
EGD of July 18, 2025 shows esophageal reflux and currently on pantoprazole 40 mg daily no bleeding/

## 2025-07-29 NOTE — ASSESSMENT & PLAN NOTE
Investigation for anemia with colonoscopy July 18, 2025 showing diverticuli with no bleeding sites  And EGD showed mild reflux, the patient is on pantoprazole 40 mg daily to continue therapy

## 2025-07-29 NOTE — ASSESSMENT & PLAN NOTE
Coronary disease essential hypertension and diabetes on rosuvastatin 5 mg daily denies myalgia or arthralgia continue therapy

## (undated) DEVICE — CATH FOLEY LUBRICATH 3WY 22F 30CC

## (undated) DEVICE — DRSNG TELFA PAD NONADH STR 1S 3X8IN

## (undated) DEVICE — SOL LR 1000ML

## (undated) DEVICE — AIRWY 90MM NO9

## (undated) DEVICE — GEL ULTRASND HI VISC 20G PACKET STRL

## (undated) DEVICE — CONTAINER,SPECIMEN,OR STERILE,4OZ: Brand: MEDLINE

## (undated) DEVICE — EVAC BLDR UROVAC W ADAPT

## (undated) DEVICE — PK CYSTO-TUR BASIC 10

## (undated) DEVICE — DRAINBAG,ANTI-REFLUX TOWER,L/F,2000ML,LL: Brand: MEDLINE

## (undated) DEVICE — IRRIGATOR TOOMEY 70CC

## (undated) DEVICE — CVR TRANSD NEOGUARD 2X30CM LF STRL

## (undated) DEVICE — MEDI-VAC YANKAUER SUCTION HANDLE W/BULBOUS TIP: Brand: CARDINAL HEALTH

## (undated) DEVICE — NDL HYPO ECLPS SFTY 18G 1 1/2IN

## (undated) DEVICE — CANNULA,OXY,ADULT,SUPERSOFT,W/7'TUB,UC: Brand: MEDLINE

## (undated) DEVICE — MAX-CORE® DISPOSABLE CORE BIOPSY INSTRUMENT, 18G X 20CM: Brand: MAX-CORE

## (undated) DEVICE — MEDI-VAC NON-CONDUCTIVE SUCTION TUBING: Brand: CARDINAL HEALTH

## (undated) DEVICE — GLV SURG SENSICARE W/ALOE PF LF 7.5 STRL